# Patient Record
Sex: MALE | Race: WHITE | Employment: OTHER | URBAN - METROPOLITAN AREA
[De-identification: names, ages, dates, MRNs, and addresses within clinical notes are randomized per-mention and may not be internally consistent; named-entity substitution may affect disease eponyms.]

---

## 2017-09-25 ENCOUNTER — HOSPITAL ENCOUNTER (OUTPATIENT)
Dept: VASCULAR LAB | Age: 77
Discharge: HOME | End: 2017-09-25
Payer: MEDICARE

## 2017-09-25 PROCEDURE — 93880 EXTRACRANIAL BILAT STUDY: CPT

## 2017-10-17 ENCOUNTER — OFFICE VISIT (OUTPATIENT)
Dept: UROLOGY | Age: 77
End: 2017-10-17
Payer: MEDICARE

## 2017-10-17 VITALS
DIASTOLIC BLOOD PRESSURE: 50 MMHG | HEART RATE: 57 BPM | HEIGHT: 74 IN | BODY MASS INDEX: 33.16 KG/M2 | WEIGHT: 258.4 LBS | SYSTOLIC BLOOD PRESSURE: 105 MMHG | TEMPERATURE: 97.4 F

## 2017-10-17 DIAGNOSIS — N40.0 BPH WITHOUT OBSTRUCTION/LOWER URINARY TRACT SYMPTOMS: ICD-10-CM

## 2017-10-17 DIAGNOSIS — Q61.02 MULTIPLE RENAL CYSTS: ICD-10-CM

## 2017-10-17 DIAGNOSIS — R31.9 HEMATURIA, UNSPECIFIED TYPE: Primary | ICD-10-CM

## 2017-10-17 LAB
BILIRUBIN, POC: ABNORMAL
BLOOD URINE, POC: ABNORMAL
CLARITY, POC: CLEAR
COLOR, POC: ABNORMAL
GLUCOSE URINE, POC: ABNORMAL
KETONES, POC: ABNORMAL
LEUKOCYTE EST, POC: ABNORMAL
NITRITE, POC: ABNORMAL
PH, POC: ABNORMAL
PROTEIN, POC: ABNORMAL
SPECIFIC GRAVITY, POC: ABNORMAL
UROBILINOGEN, POC: ABNORMAL

## 2017-10-17 PROCEDURE — 81003 URINALYSIS AUTO W/O SCOPE: CPT | Performed by: UROLOGY

## 2017-10-17 PROCEDURE — 99214 OFFICE O/P EST MOD 30 MIN: CPT | Performed by: UROLOGY

## 2017-10-17 RX ORDER — MOMETASONE FUROATE AND FORMOTEROL FUMARATE DIHYDRATE 200; 5 UG/1; UG/1
1 AEROSOL RESPIRATORY (INHALATION) DAILY
Refills: 5 | COMMUNITY
Start: 2017-10-01 | End: 2018-08-24 | Stop reason: DRUGHIGH

## 2017-10-17 RX ORDER — INFLUENZA A VIRUS A/MICHIGAN/45/2015 X-275 (H1N1) ANTIGEN (FORMALDEHYDE INACTIVATED), INFLUENZA A VIRUS A/SINGAPORE/INFIMH-16-0019/2016 IVR-186 (H3N2) ANTIGEN (FORMALDEHYDE INACTIVATED), AND INFLUENZA B VIRUS B/MARYLAND/15/2016 BX-69A (A B/COLORADO/6/2017-LIKE VIRUS) ANTIGEN (FORMALDEHYDE INACTIVATED) 60; 60; 60 UG/.5ML; UG/.5ML; UG/.5ML
INJECTION, SUSPENSION INTRAMUSCULAR
Refills: 0 | COMMUNITY
Start: 2017-09-28 | End: 2017-10-17 | Stop reason: ALTCHOICE

## 2017-10-17 NOTE — PROGRESS NOTES
every two hours?: Not at all  INTERMITTENCY: How often have you found you stopped and started again several times when you urinated?: Not at all  URGENCY: How often have you found it difficult to postpone urination?: Not at all  WEAK STREAM: How often have you had a weak urinary stream?: Not at all  STRAINING: How often have you had to strain to start  urination?: Not at all  NOCTURIA: How many times did you typically get up at night to uriniate?: 2 Times  TOTAL I-PSS SCORE[de-identified] 2  How would you feel if you were to spend the rest of your life with your urinary condition?: Pleased    Last BUN and creatinine:  Lab Results   Component Value Date    BUN 14 05/07/2015     Lab Results   Component Value Date    CREATININE 0.65 (L) 05/07/2015       Additional Lab/Culture results: none    Imaging Reviewed during this Office Visit: none  CT urogram 2015, 2 stable renal cysts  (results were independently reviewed by physician and radiology report verified)    PAST MEDICAL, FAMILY AND SOCIAL HISTORY:  Past Medical History:   Diagnosis Date    Arrhythmia     Arthritis     o.a.    Atrial flutter ([address]     BPH (benign prostatic hyperplasia)     Hard of hearing     Hyperlipidemia     Hypertension     Skin abnormalities     SCABS ON ARMS    Wears eyeglasses      Past Surgical History:   Procedure Laterality Date    CATARACT REMOVAL WITH IMPLANT Bilateral     COLONOSCOPY      polypectomy    EYE SURGERY      CATARACTS    JOINT REPLACEMENT Bilateral     Knees    KNEE JOINT MANIPULATION Left 11/25/14    OTHER SURGICAL HISTORY Left 2-10-15    Knee arthroplasty revision- poly exchange.     TOTAL KNEE ARTHROPLASTY Right 7-8-14    TOTAL KNEE ARTHROPLASTY Left Sept 18, 2014     Family History   Problem Relation Age of Onset   [facility] Cancer Brother      colon cancer    Diabetes Mother     Other Father      black lung disease     Outpatient Prescriptions Marked as Taking for the 10/17/17 encounter (Office Visit) with Daniel Payne MD   Medication Sig Dispense Refill    DULERA 200-5 MCG/ACT inhaler Inhale 1 Inhaler into the lungs daily  5    apixaban (ELIQUIS) 2.5 MG TABS tablet Take 2.5 mg by mouth 2 times daily      atorvastatin (LIPITOR) 40 MG tablet Take 40 mg by mouth nightly.  metoprolol-hydrochlorothiazide (LOPRESSOR HCT) 50-25 MG per tablet Take 1 tablet by mouth daily.  tamsulosin (FLOMAX) 0.4 MG capsule Take 0.4 mg by mouth daily. Oxycodone hcl and Other  History   Smoking Status    Former Smoker    Packs/day: 0.50    Years: 60.00    Quit date: 4/17/2017   Smokeless Tobacco    Never Used      (If patient a smoker, smoking cessation counseling offered)   History   Alcohol Use No     Comment: QUIT 10 TO 20 YRS AGO       REVIEW OF SYSTEMS:  Review of Systems    Physical Exam:    This a 76 y.o. male   Vitals:    10/17/17 0914   BP: (!) 105/50   Pulse: 57   Temp: 97.4 °F (36.3 °C)     Body mass index is 33.18 kg/m². Physical Exam  Constitutional: Patient in no acute distress. Neuro: Alert and oriented to person, place and time. Psych: Mood normal, affect normal  Skin: No rash noted  HEENT: Head: Normocephalic and atraumatic  Lungs: Respiratory effort is normal  Cardiovascular: Warm & Pink  Abdomen: Soft, non-tender, non-distended with no CVA,  No flank tenderness,  Or hepatosplenomegaly   Lymphatics: No palpable lymphadenopathy. Bladder non-tender and not distended. Musculoskeletal: Normal gait and station  Penis normal and uncircumcised  Urethral meatus normal  Scrotal exam normal  Testicles normal bilaterally      Assessment and Plan      1. Hematuria, unspecified type    2. BPH without obstruction/lower urinary tract symptoms    3. Multiple renal cysts           Plan:         We discussed his options. Given microhematuria and smoking history, I recommended a CTU and a cysto. He does want to proceed with either. No voiding symptoms despite a large prostate. F/U in 6 months.      Prescriptions

## 2017-10-17 NOTE — LETTER
[facility]  [facility] [place]  [address]  [address]  [address] [place] [id number]  Dept: [phone]  Dept Fax: [phone]        10/17/17    Patient: Rubi Odom  YOB: 1940    Dear Yefri Reed DO,    I had the pleasure of seeing one of your patients, Jitendra Patel today in the office today. Below are the relevant portions of my assessment and plan of care. IMPRESSION:     1. Hematuria, unspecified type    2. BPH without obstruction/lower urinary tract symptoms    3. Multiple renal cysts        PLAN:        We discussed his options. Given microhematuria and smoking history, I recommended a CTU and a cysto. He does want to proceed with either. No voiding symptoms despite a large prostate. F/U in 6 months. Prescriptions Ordered:  No orders of the defined types were placed in this encounter. Orders Placed:  Orders Placed This Encounter   Procedures    POCT Urinalysis No Micro (Auto)         Thank you for allowing me to participate in the care of this patient. I will keep you updated on this patient's follow up and I look forward to serving you and your patients again in the future.         Bolivar Felton MD

## 2018-03-19 ENCOUNTER — HOSPITAL ENCOUNTER (OUTPATIENT)
Age: 78
Setting detail: SPECIMEN
Discharge: HOME | End: 2018-03-19
Payer: MEDICARE

## 2018-03-19 ENCOUNTER — OFFICE VISIT (OUTPATIENT)
Dept: UROLOGY | Age: 78
End: 2018-03-19
Payer: MEDICARE

## 2018-03-19 VITALS
HEIGHT: 74 IN | BODY MASS INDEX: 33.16 KG/M2 | HEART RATE: 61 BPM | DIASTOLIC BLOOD PRESSURE: 70 MMHG | WEIGHT: 258.38 LBS | SYSTOLIC BLOOD PRESSURE: 139 MMHG | RESPIRATION RATE: 16 BRPM

## 2018-03-19 DIAGNOSIS — D69.6 THROMBOCYTOPENIA (HCC): ICD-10-CM

## 2018-03-19 DIAGNOSIS — R31.0 HEMATURIA, GROSS: Primary | ICD-10-CM

## 2018-03-19 DIAGNOSIS — R31.0 HEMATURIA, GROSS: ICD-10-CM

## 2018-03-19 DIAGNOSIS — Z79.01 ON CONTINUOUS ORAL ANTICOAGULATION: ICD-10-CM

## 2018-03-19 DIAGNOSIS — Z87.891 FORMER SMOKER: ICD-10-CM

## 2018-03-19 DIAGNOSIS — I48.91 ATRIAL FIBRILLATION, UNSPECIFIED TYPE (HCC): ICD-10-CM

## 2018-03-19 LAB
BILIRUBIN, POC: ABNORMAL
BLOOD URINE, POC: ABNORMAL
CLARITY, POC: ABNORMAL
COLOR, POC: ABNORMAL
GLUCOSE URINE, POC: ABNORMAL
KETONES, POC: ABNORMAL
LEUKOCYTE EST, POC: ABNORMAL
NITRITE, POC: ABNORMAL
PH, POC: ABNORMAL
PROTEIN, POC: ABNORMAL
SPECIFIC GRAVITY, POC: ABNORMAL
UROBILINOGEN, POC: ABNORMAL

## 2018-03-19 PROCEDURE — 99213 OFFICE O/P EST LOW 20 MIN: CPT | Performed by: NURSE PRACTITIONER

## 2018-03-19 PROCEDURE — 81003 URINALYSIS AUTO W/O SCOPE: CPT | Performed by: NURSE PRACTITIONER

## 2018-03-19 RX ORDER — AMOXICILLIN AND CLAVULANATE POTASSIUM 875; 125 MG/1; MG/1
TABLET, FILM COATED ORAL
Refills: 0 | COMMUNITY
Start: 2018-02-23 | End: 2018-04-19 | Stop reason: ALTCHOICE

## 2018-03-19 NOTE — PROGRESS NOTES
iliopsoas muscle which appears to    contain a central fluid density with enhancing margins. This lesion    extends along the iliopsoas muscle into the gluteal region and into the    right abductor region to the level of the lesser trochanter but is still    not completely imaged caudally. The right ureter is intimately associated    with this lesion but not obstructed or apparently encased by same. Lesion    most likely represents an iliopsoas abscess. Tumor or other lesion is    probably unlikely.       There are punctate calcifications within spleen likely due to healed    granulomatous disease.) No calcifications are noted on the previous 2011    CT scan of the chest. The spleen, liver, adrenal glands, pancreas and    appendix are within normal limits. There are calculi within the    gallbladder. . No definite bowel abnormalities are noted. . There is    relatively severe degenerative change in the apophyseal joints of the    lumbar spine possibly with a borderline canal stenosis at L4-L5. There is    a small apparently metallic foreign object in the right gluteal region.                   IMPRESSION:      1. Probable right iliopsoas abscess. Possibly the cause of hematuria. Contrast enhanced MRI of the pelvis, lower lumbar region and proximal    thigh is recommended .    2 stable benign 8 mm cyst right kidney; otherwise unremarkable upper    urinary tracts. 3. Prominent prostatic enlargement. 4. The lower lumbar spinal stenosis. 5. Cholelithiasis. 6. Healed granulomatous changes affecting the spleen       Report electronically signed by Nora Barakat M.D. on 5/8/2015 9:49 AM    5/8/2015 9:49 AM   Transcribed by: [facility] on May  8 2015  9:52A    Read by: Rhett Urban M.D.  [id number] on May  8 2015  9:52A    Electronically Signed by: Denis Garcia.  Rhett Urban M.D. on:  IYM  2 9775     9:52A        (results were independently reviewed by physician and radiology report verified)    [address] [facility]

## 2018-03-19 NOTE — PATIENT INSTRUCTIONS
cysto was recommended by Dr Jefferson in 2015- but pt wanted to hold off.      Recommend cysto with Dr Jefferson with smoking Hx.      Stay hydrated.      Talk to Dr Ladan Reinoso about holding anticoagulation by Dr Terry Orellana.

## 2018-03-19 NOTE — LETTER
[facility]  [facility] [place]  [address]  [address]  [address] [facility] [address]  Dept: [phone]  Dept Fax: [phone]        3/19/18    Patient: Mila Rodríguez  YOB: 1940    Dear Talat Spear, ,    I had the pleasure of seeing one of your patients, Jamila Gilliland today in the office today. Below are the relevant portions of my assessment and plan of care. IMPRESSION:     1. Hematuria, gross    2. Former smoker    3. Thrombocytopenia ([address]    4. Atrial fibrillation, unspecified type ([address]    5. On continuous oral anticoagulation        PLAN:   cysto was recommended by Dr Lazaro Winston in 2015- but pt wanted to hold off. Recommend cysto with Dr Lazaro Winston with smoking Hx. Stay hydrated. Talk to Dr Karlie Valdez about holding anticoagulation by Dr Chantel Smith. Orders Placed:  Orders Placed This Encounter   Procedures    POCT Urinalysis No Micro (Auto)       Thank you for allowing me to participate in the care of this patient. I will keep you updated on this patient's follow up and I look forward to serving you and your patients again in the future.     Shabnam Roberts, CNP

## 2018-03-20 LAB
CULTURE: NO GROWTH
CULTURE: NORMAL
Lab: NORMAL
SPECIMEN DESCRIPTION: NORMAL
STATUS: NORMAL

## 2018-03-21 DIAGNOSIS — R31.0 GROSS HEMATURIA: Primary | ICD-10-CM

## 2018-03-26 ENCOUNTER — HOSPITAL ENCOUNTER (OUTPATIENT)
Dept: ULTRASOUND IMAGING | Age: 78
Discharge: HOME | End: 2018-03-28
Payer: MEDICARE

## 2018-03-26 DIAGNOSIS — R31.0 GROSS HEMATURIA: ICD-10-CM

## 2018-03-26 PROCEDURE — 76770 US EXAM ABDO BACK WALL COMP: CPT

## 2018-04-19 ENCOUNTER — HOSPITAL ENCOUNTER (OUTPATIENT)
Age: 78
Setting detail: SPECIMEN
Discharge: HOME | End: 2018-04-19
Payer: MEDICARE

## 2018-04-19 ENCOUNTER — PROCEDURE VISIT (OUTPATIENT)
Dept: UROLOGY | Age: 78
End: 2018-04-19
Payer: MEDICARE

## 2018-04-19 VITALS — HEART RATE: 64 BPM | TEMPERATURE: 97.5 F | DIASTOLIC BLOOD PRESSURE: 74 MMHG | SYSTOLIC BLOOD PRESSURE: 135 MMHG

## 2018-04-19 DIAGNOSIS — R31.0 GROSS HEMATURIA: Primary | ICD-10-CM

## 2018-04-19 DIAGNOSIS — N40.0 BPH WITHOUT OBSTRUCTION/LOWER URINARY TRACT SYMPTOMS: ICD-10-CM

## 2018-04-19 PROCEDURE — 52000 CYSTOURETHROSCOPY: CPT | Performed by: UROLOGY

## 2018-04-19 PROCEDURE — 99999 PR OFFICE/OUTPT VISIT,PROCEDURE ONLY: CPT | Performed by: UROLOGY

## 2018-04-19 RX ORDER — FINASTERIDE 5 MG/1
5 TABLET, FILM COATED ORAL DAILY
Qty: 30 TABLET | Refills: 11 | Status: SHIPPED | OUTPATIENT
Start: 2018-04-19 | End: 2019-04-08 | Stop reason: SDUPTHER

## 2018-04-20 LAB
CASE NUMBER:: NORMAL
SPECIMEN DESCRIPTION: NORMAL
SURGICAL PATHOLOGY REPORT: NORMAL

## 2018-05-01 ENCOUNTER — TELEPHONE (OUTPATIENT)
Dept: UROLOGY | Age: 78
End: 2018-05-01

## 2018-05-02 ENCOUNTER — HOSPITAL ENCOUNTER (OUTPATIENT)
Age: 78
Setting detail: SPECIMEN
Discharge: HOME | End: 2018-05-02
Payer: MEDICARE

## 2018-05-02 ENCOUNTER — PROCEDURE VISIT (OUTPATIENT)
Dept: UROLOGY | Age: 78
End: 2018-05-02

## 2018-05-02 DIAGNOSIS — R31.0 GROSS HEMATURIA: Primary | ICD-10-CM

## 2018-05-02 PROCEDURE — 99999 PR OFFICE/OUTPT VISIT,PROCEDURE ONLY: CPT | Performed by: NURSE PRACTITIONER

## 2018-05-14 LAB — UROTHELIAL CANCER DETECTION: NORMAL

## 2018-06-28 ENCOUNTER — HOSPITAL ENCOUNTER (OUTPATIENT)
Dept: NUCLEAR MEDICINE | Age: 78
Discharge: HOME | End: 2018-06-30
Payer: MEDICARE

## 2018-06-28 ENCOUNTER — HOSPITAL ENCOUNTER (OUTPATIENT)
Dept: NON INVASIVE DIAGNOSTICS | Age: 78
Discharge: HOME | End: 2018-06-28
Payer: MEDICARE

## 2018-06-28 VITALS — SYSTOLIC BLOOD PRESSURE: 177 MMHG | DIASTOLIC BLOOD PRESSURE: 88 MMHG | HEART RATE: 63 BPM

## 2018-06-28 VITALS — BODY MASS INDEX: 33.37 KG/M2 | HEIGHT: 74 IN | WEIGHT: 260 LBS

## 2018-06-28 DIAGNOSIS — J44.9 CHRONIC OBSTRUCTIVE PULMONARY DISEASE, UNSPECIFIED COPD TYPE (HCC): ICD-10-CM

## 2018-06-28 DIAGNOSIS — I10 ESSENTIAL HYPERTENSION: ICD-10-CM

## 2018-06-28 DIAGNOSIS — R06.09 DYSPNEA ON EXERTION: ICD-10-CM

## 2018-06-28 DIAGNOSIS — I48.91 ATRIAL FIBRILLATION, UNSPECIFIED TYPE (HCC): ICD-10-CM

## 2018-06-28 DIAGNOSIS — R53.83 OTHER FATIGUE: ICD-10-CM

## 2018-06-28 DIAGNOSIS — N40.0 BENIGN PROSTATIC HYPERPLASIA WITHOUT LOWER URINARY TRACT SYMPTOMS: ICD-10-CM

## 2018-06-28 DIAGNOSIS — E78.00 HIGH CHOLESTEROL: ICD-10-CM

## 2018-06-28 DIAGNOSIS — G47.33 OBSTRUCTIVE SLEEP APNEA SYNDROME: ICD-10-CM

## 2018-06-28 DIAGNOSIS — R31.0 GROSS HEMATURIA: ICD-10-CM

## 2018-06-28 LAB
LV EF: 55 %
LV EF: 60 %
LVEF MODALITY: NORMAL
LVEF MODALITY: NORMAL

## 2018-06-28 PROCEDURE — A9500 TC99M SESTAMIBI: HCPCS | Performed by: FAMILY MEDICINE

## 2018-06-28 PROCEDURE — 3430000000 HC RX DIAGNOSTIC RADIOPHARMACEUTICAL: Performed by: FAMILY MEDICINE

## 2018-06-28 PROCEDURE — 93306 TTE W/DOPPLER COMPLETE: CPT

## 2018-06-28 PROCEDURE — 93017 CV STRESS TEST TRACING ONLY: CPT

## 2018-06-28 PROCEDURE — 78452 HT MUSCLE IMAGE SPECT MULT: CPT

## 2018-06-28 PROCEDURE — 2580000003 HC RX 258: Performed by: FAMILY MEDICINE

## 2018-06-28 PROCEDURE — 6360000002 HC RX W HCPCS: Performed by: FAMILY MEDICINE

## 2018-06-28 RX ORDER — AMINOPHYLLINE DIHYDRATE 25 MG/ML
100 INJECTION, SOLUTION INTRAVENOUS
Status: ACTIVE | OUTPATIENT
Start: 2018-06-28 | End: 2018-06-28

## 2018-06-28 RX ORDER — SODIUM CHLORIDE 0.9 % (FLUSH) 0.9 %
10 SYRINGE (ML) INJECTION PRN
Status: DISCONTINUED | OUTPATIENT
Start: 2018-06-28 | End: 2018-06-29 | Stop reason: HOSPADM

## 2018-06-28 RX ORDER — METOPROLOL TARTRATE 5 MG/5ML
2.5 INJECTION INTRAVENOUS PRN
Status: DISCONTINUED | OUTPATIENT
Start: 2018-06-28 | End: 2018-06-29 | Stop reason: HOSPADM

## 2018-06-28 RX ORDER — SODIUM CHLORIDE 0.9 % (FLUSH) 0.9 %
10 SYRINGE (ML) INJECTION PRN
Status: DISCONTINUED | OUTPATIENT
Start: 2018-06-28 | End: 2018-07-01 | Stop reason: HOSPADM

## 2018-06-28 RX ORDER — NITROGLYCERIN 0.4 MG/1
0.4 TABLET SUBLINGUAL EVERY 5 MIN PRN
Status: DISCONTINUED | OUTPATIENT
Start: 2018-06-28 | End: 2018-06-29 | Stop reason: HOSPADM

## 2018-06-28 RX ADMIN — TETRAKIS(2-METHOXYISOBUTYLISOCYANIDE)COPPER(I) TETRAFLUOROBORATE 11 MILLICURIE: 1 INJECTION, POWDER, LYOPHILIZED, FOR SOLUTION INTRAVENOUS at 07:13

## 2018-06-28 RX ADMIN — Medication 10 ML: at 08:52

## 2018-06-28 RX ADMIN — Medication 10 ML: at 07:13

## 2018-06-28 RX ADMIN — TETRAKIS(2-METHOXYISOBUTYLISOCYANIDE)COPPER(I) TETRAFLUOROBORATE 35.1 MILLICURIE: 1 INJECTION, POWDER, LYOPHILIZED, FOR SOLUTION INTRAVENOUS at 09:12

## 2018-06-28 RX ADMIN — Medication 10 ML: at 09:11

## 2018-06-28 RX ADMIN — REGADENOSON 0.4 MG: 0.08 INJECTION, SOLUTION INTRAVENOUS at 09:10

## 2018-06-28 NOTE — PROCEDURES
[address] [facility]                      [address] [facility]                                CARDIAC STRESS TEST    PATIENT NAME: Tanya Singer                     :        1940  MED REC NO:   [id number]                              ROOM:  ACCOUNT NO:   [de-identified]                           ADMIT DATE: 2018  PROVIDER:     Roger Lopez    DATE OF STUDY:  2018    ORDERING PROVIDER:  Barby Toledo MD    INTERPRETING PHYSICIAN:  CHARLEEN DEL ROSARIO MD    LEXISCAN STRESS TEST    INDICATION:  ATRIAL FIBRILLATION. INTERPRETATION:  Resting heart rate:  63 bpm.  Resting blood pressure:  177/88 mmHg. Resting EKG:  Atrial fibrillation. Intraventricular conduction delay. Premature ventricular contractions. Stress heart response:  Normal response. Blood pressure response:  Appropriate. Stress EKGs:  Normal.  No chest pain during stress or recovery. No ischemic EKG changes. IMPRESSION:  NEGATIVE LEXISCAN STRESS TEST. THE NUCLEAR SCAN TO FOLLOW.         CHYNA DEL ROSARIO    D: 2018 9:25:32       T: 2018 9:27:49     ANUPAMA/SHELIA  Job#: [id number]     Doc#: Unknown    CC:    (Retain this field even if not dictated or not decipherable)

## 2018-08-03 ENCOUNTER — OFFICE VISIT (OUTPATIENT)
Dept: UROLOGY | Age: 78
End: 2018-08-03
Payer: MEDICARE

## 2018-08-03 VITALS — HEART RATE: 62 BPM | DIASTOLIC BLOOD PRESSURE: 71 MMHG | TEMPERATURE: 97.6 F | SYSTOLIC BLOOD PRESSURE: 129 MMHG

## 2018-08-03 DIAGNOSIS — R31.0 GROSS HEMATURIA: ICD-10-CM

## 2018-08-03 DIAGNOSIS — N13.8 BPH WITH OBSTRUCTION/LOWER URINARY TRACT SYMPTOMS: Primary | ICD-10-CM

## 2018-08-03 DIAGNOSIS — N40.1 BPH WITH OBSTRUCTION/LOWER URINARY TRACT SYMPTOMS: Primary | ICD-10-CM

## 2018-08-03 DIAGNOSIS — R33.9 INCOMPLETE BLADDER EMPTYING: ICD-10-CM

## 2018-08-03 LAB
BILIRUBIN, POC: ABNORMAL
BLOOD URINE, POC: ABNORMAL
CLARITY, POC: ABNORMAL
COLOR, POC: YELLOW
GLUCOSE URINE, POC: ABNORMAL
KETONES, POC: ABNORMAL
LEUKOCYTE EST, POC: ABNORMAL
NITRITE, POC: ABNORMAL
PH, POC: ABNORMAL
PROTEIN, POC: ABNORMAL
SPECIFIC GRAVITY, POC: ABNORMAL
UROBILINOGEN, POC: ABNORMAL

## 2018-08-03 PROCEDURE — 99214 OFFICE O/P EST MOD 30 MIN: CPT | Performed by: UROLOGY

## 2018-08-03 PROCEDURE — 51798 US URINE CAPACITY MEASURE: CPT | Performed by: UROLOGY

## 2018-08-03 PROCEDURE — 81002 URINALYSIS NONAUTO W/O SCOPE: CPT | Performed by: UROLOGY

## 2018-08-03 ASSESSMENT — ENCOUNTER SYMPTOMS
EYE REDNESS: 0
NAUSEA: 0
SHORTNESS OF BREATH: 1
COLOR CHANGE: 0
WHEEZING: 0
EYE PAIN: 0
BACK PAIN: 0
COUGH: 1
VOMITING: 0
ABDOMINAL PAIN: 0

## 2018-08-03 NOTE — PROGRESS NOTES
by mouth daily Takes 2 tablets to equal 0.8 mg         Oxycodone hcl and Other  History   Smoking Status    Former Smoker    Packs/day: 0.50    Years: 60.00    Quit date: 4/17/2017   Smokeless Tobacco    Never Used     (If patient a smoker, smoking cessation counseling offered)    History   Alcohol Use No     Comment: quit in 2000       REVIEW OF SYSTEMS:  Review of Systems    Physical Exam:      Vitals:    08/03/18 1112   BP: 129/71   Pulse: 62   Temp: 97.6 °F (36.4 °C)     There is no height or weight on file to calculate BMI. Patient is a [de-identified] y.o. male in no acute distress and alert and oriented to person, place and time. Physical Exam  Constitutional: Patient in no acute distress. Neuro: Alert and oriented to person, place and time. Psych: Mood normal, affect normal  Lungs: Respiratory effort is normal  Cardiovascular: Warm & Pink  Abdomen: Soft, non-tender, non-distended with no CVA,  No flank tenderness,  Or hepatosplenomegaly   Lymphatics: No palpable lymphadenopathy. Bladder non-tender and not distended. Musculoskeletal: Normal gait and station  Testiscles: Normal, bilaterally      Assessment and Plan      1. BPH with obstruction/lower urinary tract symptoms    2. Gross hematuria    3. Incomplete bladder emptying           Plan: Will obtain Ct urogram.  Needs cysto. Prescriptions Ordered:  No orders of the defined types were placed in this encounter.      Orders Placed:  Orders Placed This Encounter   Procedures    CT UROGRAM     Standing Status:   Future     Number of Occurrences:   1     Standing Expiration Date:   8/3/2019    BUN & Creatinine     Standing Status:   Future     Standing Expiration Date:   7/29/2019    POCT Urinalysis no Micro    NM MEASUREMENT,POST-VOID RESIDUAL VOLUME BY US,NON-IMAGING          Juni Treviño MD

## 2018-08-09 ENCOUNTER — HOSPITAL ENCOUNTER (OUTPATIENT)
Dept: CT IMAGING | Age: 78
Discharge: HOME | End: 2018-08-11
Payer: MEDICARE

## 2018-08-09 DIAGNOSIS — R31.0 GROSS HEMATURIA: ICD-10-CM

## 2018-08-09 LAB
BUN BLDV-MCNC: 21 MG/DL (ref 8–23)
CREAT SERPL-MCNC: 0.93 MG/DL (ref 0.7–1.2)
GFR AFRICAN AMERICAN: >60 ML/MIN
GFR NON-AFRICAN AMERICAN: >60 ML/MIN
GFR SERPL CREATININE-BSD FRML MDRD: NORMAL ML/MIN/{1.73_M2}
GFR SERPL CREATININE-BSD FRML MDRD: NORMAL ML/MIN/{1.73_M2}

## 2018-08-09 PROCEDURE — 6360000004 HC RX CONTRAST MEDICATION: Performed by: UROLOGY

## 2018-08-09 PROCEDURE — 74178 CT ABD&PLV WO CNTR FLWD CNTR: CPT

## 2018-08-09 PROCEDURE — 84520 ASSAY OF UREA NITROGEN: CPT

## 2018-08-09 PROCEDURE — 36415 COLL VENOUS BLD VENIPUNCTURE: CPT

## 2018-08-09 PROCEDURE — 82565 ASSAY OF CREATININE: CPT

## 2018-08-09 PROCEDURE — 2580000003 HC RX 258: Performed by: UROLOGY

## 2018-08-09 RX ORDER — 0.9 % SODIUM CHLORIDE 0.9 %
80 INTRAVENOUS SOLUTION INTRAVENOUS ONCE
Status: COMPLETED | OUTPATIENT
Start: 2018-08-09 | End: 2018-08-09

## 2018-08-09 RX ORDER — SODIUM CHLORIDE 0.9 % (FLUSH) 0.9 %
10 SYRINGE (ML) INJECTION PRN
Status: DISCONTINUED | OUTPATIENT
Start: 2018-08-09 | End: 2018-08-12 | Stop reason: HOSPADM

## 2018-08-09 RX ADMIN — IOPAMIDOL 120 ML: 755 INJECTION, SOLUTION INTRAVENOUS at 10:37

## 2018-08-09 RX ADMIN — SODIUM CHLORIDE 80 ML: 9 INJECTION, SOLUTION INTRAVENOUS at 10:36

## 2018-08-09 RX ADMIN — Medication 10 ML: at 10:37

## 2018-08-15 ENCOUNTER — TELEPHONE (OUTPATIENT)
Dept: UROLOGY | Age: 78
End: 2018-08-15

## 2018-08-24 ENCOUNTER — HOSPITAL ENCOUNTER (OUTPATIENT)
Dept: PREADMISSION TESTING | Age: 78
Discharge: HOME | End: 2018-08-28
Payer: MEDICARE

## 2018-08-24 VITALS
WEIGHT: 257 LBS | OXYGEN SATURATION: 95 % | TEMPERATURE: 98.4 F | DIASTOLIC BLOOD PRESSURE: 57 MMHG | HEART RATE: 60 BPM | BODY MASS INDEX: 32.98 KG/M2 | RESPIRATION RATE: 16 BRPM | SYSTOLIC BLOOD PRESSURE: 99 MMHG | HEIGHT: 74 IN

## 2018-08-24 LAB
ABSOLUTE BANDS #: 0.47 K/UL (ref 0–1)
ABSOLUTE EOS #: 0 K/UL (ref 0–0.4)
ABSOLUTE IMMATURE GRANULOCYTE: ABNORMAL K/UL (ref 0–0.3)
ABSOLUTE LYMPH #: 1.21 K/UL (ref 1–4.8)
ABSOLUTE MONO #: 2.05 K/UL (ref 0.1–1.3)
ANION GAP SERPL CALCULATED.3IONS-SCNC: 12 MMOL/L (ref 9–17)
ATYPICAL LYMPHOCYTE ABSOLUTE COUNT: 0.09 K/UL
ATYPICAL LYMPHOCYTES: 1 %
BANDS: 5 % (ref 0–10)
BASOPHILS # BLD: 0 % (ref 0–2)
BASOPHILS ABSOLUTE: 0 K/UL (ref 0–0.2)
BUN BLDV-MCNC: 22 MG/DL (ref 8–23)
BUN/CREAT BLD: ABNORMAL (ref 9–20)
CALCIUM SERPL-MCNC: 9.7 MG/DL (ref 8.6–10.4)
CHLORIDE BLD-SCNC: 96 MMOL/L (ref 98–107)
CO2: 33 MMOL/L (ref 20–31)
CREAT SERPL-MCNC: 0.92 MG/DL (ref 0.7–1.2)
DIFFERENTIAL TYPE: ABNORMAL
EKG ATRIAL RATE: 69 BPM
EKG Q-T INTERVAL: 438 MS
EKG QRS DURATION: 98 MS
EKG QTC CALCULATION (BAZETT): 438 MS
EKG R AXIS: -36 DEGREES
EKG T AXIS: 7 DEGREES
EKG VENTRICULAR RATE: 60 BPM
EOSINOPHILS RELATIVE PERCENT: 0 % (ref 0–4)
GFR AFRICAN AMERICAN: >60 ML/MIN
GFR NON-AFRICAN AMERICAN: >60 ML/MIN
GFR SERPL CREATININE-BSD FRML MDRD: ABNORMAL ML/MIN/{1.73_M2}
GFR SERPL CREATININE-BSD FRML MDRD: ABNORMAL ML/MIN/{1.73_M2}
GLUCOSE BLD-MCNC: 108 MG/DL (ref 70–99)
HCT VFR BLD CALC: 43.1 % (ref 41–53)
HEMOGLOBIN: 14.5 G/DL (ref 13.5–17.5)
IMMATURE GRANULOCYTES: ABNORMAL %
LYMPHOCYTES # BLD: 13 % (ref 24–44)
MCH RBC QN AUTO: 32.6 PG (ref 26–34)
MCHC RBC AUTO-ENTMCNC: 33.7 G/DL (ref 31–37)
MCV RBC AUTO: 96.8 FL (ref 80–100)
MONOCYTES # BLD: 22 % (ref 1–7)
MORPHOLOGY: NORMAL
NRBC AUTOMATED: ABNORMAL PER 100 WBC
PDW BLD-RTO: 13.6 % (ref 11.5–14.9)
PLATELET # BLD: 150 K/UL (ref 150–450)
PLATELET ESTIMATE: ABNORMAL
PMV BLD AUTO: 9.8 FL (ref 6–12)
POTASSIUM SERPL-SCNC: 4 MMOL/L (ref 3.7–5.3)
RBC # BLD: 4.45 M/UL (ref 4.5–5.9)
RBC # BLD: ABNORMAL 10*6/UL
SEG NEUTROPHILS: 59 % (ref 36–66)
SEGMENTED NEUTROPHILS ABSOLUTE COUNT: 5.48 K/UL (ref 1.3–9.1)
SODIUM BLD-SCNC: 141 MMOL/L (ref 135–144)
WBC # BLD: 9.3 K/UL (ref 3.5–11)
WBC # BLD: ABNORMAL 10*3/UL

## 2018-08-24 PROCEDURE — 93005 ELECTROCARDIOGRAM TRACING: CPT

## 2018-08-24 PROCEDURE — 36415 COLL VENOUS BLD VENIPUNCTURE: CPT

## 2018-08-24 PROCEDURE — 87186 SC STD MICRODIL/AGAR DIL: CPT

## 2018-08-24 PROCEDURE — 80048 BASIC METABOLIC PNL TOTAL CA: CPT

## 2018-08-24 PROCEDURE — 87086 URINE CULTURE/COLONY COUNT: CPT

## 2018-08-24 PROCEDURE — 86403 PARTICLE AGGLUT ANTBDY SCRN: CPT

## 2018-08-24 PROCEDURE — 85025 COMPLETE CBC W/AUTO DIFF WBC: CPT

## 2018-08-24 RX ORDER — FUROSEMIDE 20 MG/1
20 TABLET ORAL 2 TIMES DAILY
Status: ON HOLD | COMMUNITY
End: 2018-11-19

## 2018-08-24 RX ORDER — ALBUTEROL SULFATE 90 UG/1
2 AEROSOL, METERED RESPIRATORY (INHALATION) 4 TIMES DAILY PRN
COMMUNITY

## 2018-08-24 RX ORDER — POTASSIUM CHLORIDE 750 MG/1
20 CAPSULE, EXTENDED RELEASE ORAL DAILY
COMMUNITY
End: 2018-10-17 | Stop reason: DRUGHIGH

## 2018-08-24 ASSESSMENT — PAIN SCALES - GENERAL: PAINLEVEL_OUTOF10: 2

## 2018-08-24 ASSESSMENT — PAIN DESCRIPTION - LOCATION: LOCATION: BACK

## 2018-08-24 ASSESSMENT — PAIN DESCRIPTION - ORIENTATION: ORIENTATION: MID

## 2018-08-24 NOTE — H&P
HISTORY and [address]       NAME:  Frankie Bower  MRN: [id number]   YOB: 1940   Date: 8/24/2018   Age: [de-identified] y.o. Gender: male       COMPLAINT AND PRESENT HISTORY:   Frankie Bower is [de-identified] y.o.,   male, undergoing preadmission testing for BPH with obstruction. Patient will be having a   CYSTO URETEROSCOPY RIGHT (HOLMIUM LASER ON STANDBY) W/STENT INSERTION &  BLADDER BIOPSY FULGERATION-Right. Pt C/O of poor flow of urine, dribbling, frequency and a sense of incomplete evacuation of the Bladder. Pt has hx of gross hematuria. No Nausea Vomiting or diaphoresis. no fever or chills. Symptoms started 6 months ago with hx of recurrent UT. PAST MEDICAL HISTORY     Past Medical History:   Diagnosis Date    Ambulates with cane     unstable, total knee surgery didn't help stability    Arrhythmia     Arthritis     o.a.    At risk for falling     unstable walking, uses cane    Atrial flutter (HCC)     BPH (benign prostatic hyperplasia)     Cholelithiases     seen on CT    COPD (chronic obstructive pulmonary disease) ([address]     Hard of hearing     no hearing aids    Hematuria     History of pneumonia 02/2018    Hyperlipidemia     Hypertension     Kidney stone     seen on CT, 2 mm right ureter    Skin abnormalities     gets random SCABS ON ARMS    Wears eyeglasses        Pt denies any history of Diabetes mellitus type 2,  stroke,  COPD, Asthma, GERD,  Cancer, Seizures,Thyroid disease, Kidney Disease, Hepatitis, TB, Psychiatric Disorders or Substance abuse.     SURGICAL HISTORY       Past Surgical History:   Procedure Laterality Date    CATARACT REMOVAL WITH IMPLANT Bilateral     COLONOSCOPY      polypectomy    EYE SURGERY Bilateral     CATARACTS, see other surgical history    JOINT REPLACEMENT Bilateral     Knees, see other surgical history    KNEE JOINT MANIPULATION Left 11/25/14    OTHER SURGICAL HISTORY Left 2-10-15    Knee arthroplasty revision- poly exchange.  TOTAL KNEE ARTHROPLASTY Right 7-8-14    TOTAL KNEE ARTHROPLASTY Left Sept 18, 2014       FAMILY HISTORY       Family History   Problem Relation Age of Onset    Cancer Brother         colon cancer    Diabetes Mother     Other Father         black lung disease       SOCIAL HISTORY       Social History     Social History    Marital status:      Spouse name: N/A    Number of children: N/A    Years of education: N/A     Occupational History    retired [employer]     Social History Main Topics    Smoking status: Former Smoker     Packs/day: 0.50     Years: 60.00     Quit date: 4/17/2017    Smokeless tobacco: Never Used    Alcohol use No      Comment: quit in 2000    Drug use: No    Sexual activity: Not Asked     Other Topics Concern    None     Social History Narrative    None        REVIEW OF SYSTEMS      Allergies   Allergen Reactions    Oxycodone Hcl      confusion    Other      Vee Koehler is \"allergic to all pain pills except for the one he is on now---NORCO. \"       Current Outpatient Prescriptions on File Prior to Encounter   Medication Sig Dispense Refill    aspirin 81 MG tablet Take 81 mg by mouth daily      finasteride (PROSCAR) 5 MG tablet Take 1 tablet by mouth daily 30 tablet 11    atorvastatin (LIPITOR) 40 MG tablet Take 40 mg by mouth nightly.  metoprolol-hydrochlorothiazide (LOPRESSOR HCT) 50-25 MG per tablet Take 1 tablet by mouth daily.  tamsulosin (FLOMAX) 0.4 MG capsule Take 0.8 mg by mouth daily Takes 2 tablets to equal 0.8 mg       No current facility-administered medications on file prior to encounter. General health:  Fairly good. No fever or chills. Skin:  No itching, redness or rash. HEENT:  No headache, epistaxis or sore throat. Neck:  No pain, stiffness or masses.                  Cardiovascular/Respiratory system:  No chest pain, palpitation or shortness of

## 2018-08-25 ENCOUNTER — ANESTHESIA EVENT (OUTPATIENT)
Dept: OPERATING ROOM | Age: 78
End: 2018-08-25
Payer: MEDICARE

## 2018-08-25 LAB
CULTURE: ABNORMAL
Lab: ABNORMAL
ORGANISM: ABNORMAL
SPECIMEN DESCRIPTION: ABNORMAL
STATUS: ABNORMAL

## 2018-08-25 RX ORDER — SODIUM CHLORIDE, SODIUM LACTATE, POTASSIUM CHLORIDE, CALCIUM CHLORIDE 600; 310; 30; 20 MG/100ML; MG/100ML; MG/100ML; MG/100ML
INJECTION, SOLUTION INTRAVENOUS CONTINUOUS
Status: CANCELLED | OUTPATIENT
Start: 2018-08-25

## 2018-08-25 RX ORDER — SODIUM CHLORIDE 0.9 % (FLUSH) 0.9 %
10 SYRINGE (ML) INJECTION EVERY 12 HOURS SCHEDULED
Status: CANCELLED | OUTPATIENT
Start: 2018-08-25

## 2018-08-25 RX ORDER — LIDOCAINE HYDROCHLORIDE 10 MG/ML
1 INJECTION, SOLUTION EPIDURAL; INFILTRATION; INTRACAUDAL; PERINEURAL
Status: CANCELLED | OUTPATIENT
Start: 2018-08-25 | End: 2018-08-25

## 2018-08-25 RX ORDER — SODIUM CHLORIDE 0.9 % (FLUSH) 0.9 %
10 SYRINGE (ML) INJECTION PRN
Status: CANCELLED | OUTPATIENT
Start: 2018-08-25

## 2018-08-28 ENCOUNTER — OFFICE VISIT (OUTPATIENT)
Dept: UROLOGY | Age: 78
End: 2018-08-28
Payer: MEDICARE

## 2018-08-28 VITALS — HEART RATE: 62 BPM | DIASTOLIC BLOOD PRESSURE: 74 MMHG | SYSTOLIC BLOOD PRESSURE: 140 MMHG | TEMPERATURE: 97.6 F

## 2018-08-28 DIAGNOSIS — R31.0 GROSS HEMATURIA: ICD-10-CM

## 2018-08-28 DIAGNOSIS — R93.41 ABNORMAL RADIOLOGIC FINDINGS ON DIAGNOSTIC IMAGING OF RENAL PELVIS, URETER, OR BLADDER: ICD-10-CM

## 2018-08-28 DIAGNOSIS — N30.01 ACUTE CYSTITIS WITH HEMATURIA: Primary | ICD-10-CM

## 2018-08-28 PROCEDURE — 99214 OFFICE O/P EST MOD 30 MIN: CPT | Performed by: UROLOGY

## 2018-08-28 RX ORDER — DOXYCYCLINE 100 MG/1
100 CAPSULE ORAL 2 TIMES DAILY
Qty: 14 CAPSULE | Refills: 0 | Status: ON HOLD | OUTPATIENT
Start: 2018-08-28 | End: 2018-09-14 | Stop reason: ALTCHOICE

## 2018-08-28 ASSESSMENT — ENCOUNTER SYMPTOMS
ABDOMINAL PAIN: 0
BACK PAIN: 1
COLOR CHANGE: 0
EYE PAIN: 0
NAUSEA: 0
COUGH: 0
SHORTNESS OF BREATH: 1
WHEEZING: 0
EYE REDNESS: 0
VOMITING: 0

## 2018-08-28 NOTE — PROGRESS NOTES
Take 1 tablet by mouth daily.  tamsulosin (FLOMAX) 0.4 MG capsule Take 0.8 mg by mouth daily Takes 2 tablets to equal 0.8 mg         Oxycodone hcl and Other  History   Smoking Status    Former Smoker    Packs/day: 0.50    Years: 60.00    Quit date: 4/17/2017   Smokeless Tobacco    Never Used     (If patient a smoker, smoking cessation counseling offered)    History   Alcohol Use No     Comment: quit in 2000       REVIEW OF SYSTEMS:  Review of Systems     Agree with ROS    Physical Exam:      Vitals:    08/28/18 1133   BP: (!) 140/74   Pulse:    Temp:      There is no height or weight on file to calculate BMI. Patient is a [de-identified] y.o. male in no acute distress and alert and oriented to person, place and time. Physical Exam  Constitutional: Patient in no acute distress. Neuro: Alert and oriented to person, place and time. Psych: Mood normal, affect normal  Lungs: Respiratory effort is normal  Cardiovascular: Warm & Pink  Abdomen: Soft, non-tender, non-distended with no CVA,  No flank tenderness,  Or hepatosplenomegaly   Lymphatics: No palpable lymphadenopathy. Bladder non-tender and not distended. Musculoskeletal: Normal gait and station      Assessment and Plan      1. Acute cystitis with hematuria    2. Gross hematuria    3. Abnormal radiologic findings on diagnostic imaging of renal pelvis, ureter, or bladder           Plan: Will start Doxycycline  He is set up for cysto with right URS and possible biopsy of collecting system lesion. Return for surgery. Prescriptions Ordered:  Orders Placed This Encounter   Medications    doxycycline monohydrate (MONODOX) 100 MG capsule     Sig: Take 1 capsule by mouth 2 times daily     Dispense:  14 capsule     Refill:  0      Orders Placed:  No orders of the defined types were placed in this encounter. Fernando Francis MD    Agree with the ROS entered by the MA.

## 2018-08-29 ENCOUNTER — TELEPHONE (OUTPATIENT)
Dept: UROLOGY | Age: 78
End: 2018-08-29

## 2018-08-29 NOTE — TELEPHONE ENCOUNTER
Cysto, (RT) URS, (RT) stent, bladder BX & Fulg @ [facility] 9/14/18 3:30pm **STOP BLOOD THINNERS 9/7/18**  PAT was done 8/24/18                Spoke with wife, new procedure info sent 8/29/18.

## 2018-09-04 ENCOUNTER — TELEPHONE (OUTPATIENT)
Dept: UROLOGY | Age: 78
End: 2018-09-04

## 2018-09-06 ENCOUNTER — TELEPHONE (OUTPATIENT)
Dept: UROLOGY | Age: 78
End: 2018-09-06

## 2018-09-06 NOTE — TELEPHONE ENCOUNTER
Pt's wife states that she called 2 days ago and was told that gross hematuria would be discussed with Dr Kaushik Riley. Pt still has not heard back from the office. Pt was treated for gross hematuria on 8/28/18 with doxycycline and symptoms have not subsided. Pt is scheduled for cysto, Rt URS/stent, and bladder bx/fulg on 9/14/18. Please advise.

## 2018-09-14 ENCOUNTER — HOSPITAL ENCOUNTER (OUTPATIENT)
Age: 78
Setting detail: OUTPATIENT SURGERY
Discharge: HOME | End: 2018-09-14
Attending: UROLOGY | Admitting: UROLOGY
Payer: MEDICARE

## 2018-09-14 ENCOUNTER — APPOINTMENT (OUTPATIENT)
Dept: GENERAL RADIOLOGY | Age: 78
End: 2018-09-14
Attending: UROLOGY
Payer: MEDICARE

## 2018-09-14 ENCOUNTER — ANESTHESIA (OUTPATIENT)
Dept: OPERATING ROOM | Age: 78
End: 2018-09-14
Payer: MEDICARE

## 2018-09-14 VITALS
TEMPERATURE: 97.3 F | WEIGHT: 257 LBS | BODY MASS INDEX: 32.98 KG/M2 | OXYGEN SATURATION: 96 % | DIASTOLIC BLOOD PRESSURE: 69 MMHG | HEART RATE: 76 BPM | SYSTOLIC BLOOD PRESSURE: 134 MMHG | HEIGHT: 74 IN | RESPIRATION RATE: 20 BRPM

## 2018-09-14 VITALS
OXYGEN SATURATION: 100 % | TEMPERATURE: 96.8 F | DIASTOLIC BLOOD PRESSURE: 60 MMHG | SYSTOLIC BLOOD PRESSURE: 104 MMHG | RESPIRATION RATE: 7 BRPM

## 2018-09-14 DIAGNOSIS — R31.0 GROSS HEMATURIA: Primary | ICD-10-CM

## 2018-09-14 PROCEDURE — 88342 IMHCHEM/IMCYTCHM 1ST ANTB: CPT

## 2018-09-14 PROCEDURE — 2580000003 HC RX 258: Performed by: NURSE ANESTHETIST, CERTIFIED REGISTERED

## 2018-09-14 PROCEDURE — 6360000004 HC RX CONTRAST MEDICATION: Performed by: UROLOGY

## 2018-09-14 PROCEDURE — 2580000003 HC RX 258: Performed by: ANESTHESIOLOGY

## 2018-09-14 PROCEDURE — 7100000001 HC PACU RECOVERY - ADDTL 15 MIN: Performed by: UROLOGY

## 2018-09-14 PROCEDURE — 3209999900 FLUORO FOR SURGICAL PROCEDURES

## 2018-09-14 PROCEDURE — 7100000000 HC PACU RECOVERY - FIRST 15 MIN: Performed by: UROLOGY

## 2018-09-14 PROCEDURE — 3600000002 HC SURGERY LEVEL 2 BASE: Performed by: UROLOGY

## 2018-09-14 PROCEDURE — C1769 GUIDE WIRE: HCPCS | Performed by: UROLOGY

## 2018-09-14 PROCEDURE — C2617 STENT, NON-COR, TEM W/O DEL: HCPCS | Performed by: UROLOGY

## 2018-09-14 PROCEDURE — 3700000001 HC ADD 15 MINUTES (ANESTHESIA): Performed by: UROLOGY

## 2018-09-14 PROCEDURE — C1758 CATHETER, URETERAL: HCPCS | Performed by: UROLOGY

## 2018-09-14 PROCEDURE — 2709999900 HC NON-CHARGEABLE SUPPLY: Performed by: UROLOGY

## 2018-09-14 PROCEDURE — 2720000010 HC SURG SUPPLY STERILE: Performed by: UROLOGY

## 2018-09-14 PROCEDURE — 7100000031 HC ASPR PHASE II RECOVERY - ADDTL 15 MIN: Performed by: UROLOGY

## 2018-09-14 PROCEDURE — 7100000030 HC ASPR PHASE II RECOVERY - FIRST 15 MIN: Performed by: UROLOGY

## 2018-09-14 PROCEDURE — 3700000000 HC ANESTHESIA ATTENDED CARE: Performed by: UROLOGY

## 2018-09-14 PROCEDURE — 6360000002 HC RX W HCPCS: Performed by: UROLOGY

## 2018-09-14 PROCEDURE — 2500000003 HC RX 250 WO HCPCS: Performed by: NURSE ANESTHETIST, CERTIFIED REGISTERED

## 2018-09-14 PROCEDURE — 3600000012 HC SURGERY LEVEL 2 ADDTL 15MIN: Performed by: UROLOGY

## 2018-09-14 PROCEDURE — 88305 TISSUE EXAM BY PATHOLOGIST: CPT

## 2018-09-14 PROCEDURE — 6360000002 HC RX W HCPCS: Performed by: NURSE ANESTHETIST, CERTIFIED REGISTERED

## 2018-09-14 PROCEDURE — 74018 RADEX ABDOMEN 1 VIEW: CPT

## 2018-09-14 PROCEDURE — 74420 UROGRAPHY RTRGR +-KUB: CPT

## 2018-09-14 DEVICE — URETERAL STENT
Type: IMPLANTABLE DEVICE | Site: URETER | Status: FUNCTIONAL
Brand: POLARIS™ ULTRA

## 2018-09-14 RX ORDER — MEPERIDINE HYDROCHLORIDE 50 MG/ML
12.5 INJECTION INTRAMUSCULAR; INTRAVENOUS; SUBCUTANEOUS EVERY 5 MIN PRN
Status: DISCONTINUED | OUTPATIENT
Start: 2018-09-14 | End: 2018-09-14 | Stop reason: HOSPADM

## 2018-09-14 RX ORDER — CEFAZOLIN SODIUM 1 G/3ML
INJECTION, POWDER, FOR SOLUTION INTRAMUSCULAR; INTRAVENOUS
Status: DISCONTINUED
Start: 2018-09-14 | End: 2018-09-14 | Stop reason: HOSPADM

## 2018-09-14 RX ORDER — LIDOCAINE HYDROCHLORIDE 10 MG/ML
1 INJECTION, SOLUTION EPIDURAL; INFILTRATION; INTRACAUDAL; PERINEURAL
Status: DISCONTINUED | OUTPATIENT
Start: 2018-09-14 | End: 2018-09-14 | Stop reason: HOSPADM

## 2018-09-14 RX ORDER — SODIUM CHLORIDE, SODIUM LACTATE, POTASSIUM CHLORIDE, CALCIUM CHLORIDE 600; 310; 30; 20 MG/100ML; MG/100ML; MG/100ML; MG/100ML
INJECTION, SOLUTION INTRAVENOUS CONTINUOUS
Status: DISCONTINUED | OUTPATIENT
Start: 2018-09-14 | End: 2018-09-14 | Stop reason: HOSPADM

## 2018-09-14 RX ORDER — LIDOCAINE HYDROCHLORIDE 10 MG/ML
INJECTION, SOLUTION EPIDURAL; INFILTRATION; INTRACAUDAL; PERINEURAL PRN
Status: DISCONTINUED | OUTPATIENT
Start: 2018-09-14 | End: 2018-09-14 | Stop reason: SDUPTHER

## 2018-09-14 RX ORDER — SODIUM CHLORIDE, SODIUM LACTATE, POTASSIUM CHLORIDE, CALCIUM CHLORIDE 600; 310; 30; 20 MG/100ML; MG/100ML; MG/100ML; MG/100ML
INJECTION, SOLUTION INTRAVENOUS CONTINUOUS PRN
Status: DISCONTINUED | OUTPATIENT
Start: 2018-09-14 | End: 2018-09-14 | Stop reason: SDUPTHER

## 2018-09-14 RX ORDER — SODIUM CHLORIDE 0.9 % (FLUSH) 0.9 %
10 SYRINGE (ML) INJECTION EVERY 12 HOURS SCHEDULED
Status: DISCONTINUED | OUTPATIENT
Start: 2018-09-14 | End: 2018-09-14 | Stop reason: HOSPADM

## 2018-09-14 RX ORDER — SODIUM CHLORIDE 0.9 % (FLUSH) 0.9 %
10 SYRINGE (ML) INJECTION PRN
Status: DISCONTINUED | OUTPATIENT
Start: 2018-09-14 | End: 2018-09-14 | Stop reason: HOSPADM

## 2018-09-14 RX ORDER — DIPHENHYDRAMINE HYDROCHLORIDE 50 MG/ML
12.5 INJECTION INTRAMUSCULAR; INTRAVENOUS
Status: DISCONTINUED | OUTPATIENT
Start: 2018-09-14 | End: 2018-09-14 | Stop reason: HOSPADM

## 2018-09-14 RX ORDER — ONDANSETRON 2 MG/ML
INJECTION INTRAMUSCULAR; INTRAVENOUS PRN
Status: DISCONTINUED | OUTPATIENT
Start: 2018-09-14 | End: 2018-09-14 | Stop reason: SDUPTHER

## 2018-09-14 RX ORDER — ONDANSETRON 2 MG/ML
4 INJECTION INTRAMUSCULAR; INTRAVENOUS
Status: DISCONTINUED | OUTPATIENT
Start: 2018-09-14 | End: 2018-09-14 | Stop reason: HOSPADM

## 2018-09-14 RX ORDER — LABETALOL HYDROCHLORIDE 5 MG/ML
5 INJECTION, SOLUTION INTRAVENOUS EVERY 10 MIN PRN
Status: DISCONTINUED | OUTPATIENT
Start: 2018-09-14 | End: 2018-09-14 | Stop reason: HOSPADM

## 2018-09-14 RX ORDER — FENTANYL CITRATE 50 UG/ML
50 INJECTION, SOLUTION INTRAMUSCULAR; INTRAVENOUS EVERY 5 MIN PRN
Status: DISCONTINUED | OUTPATIENT
Start: 2018-09-14 | End: 2018-09-14 | Stop reason: HOSPADM

## 2018-09-14 RX ORDER — PROPOFOL 10 MG/ML
INJECTION, EMULSION INTRAVENOUS PRN
Status: DISCONTINUED | OUTPATIENT
Start: 2018-09-14 | End: 2018-09-14 | Stop reason: SDUPTHER

## 2018-09-14 RX ORDER — HYDROCODONE BITARTRATE AND ACETAMINOPHEN 5; 325 MG/1; MG/1
1 TABLET ORAL EVERY 6 HOURS PRN
Qty: 10 TABLET | Refills: 0 | Status: SHIPPED | OUTPATIENT
Start: 2018-09-14 | End: 2018-09-16

## 2018-09-14 RX ORDER — DEXAMETHASONE SODIUM PHOSPHATE 4 MG/ML
INJECTION, SOLUTION INTRA-ARTICULAR; INTRALESIONAL; INTRAMUSCULAR; INTRAVENOUS; SOFT TISSUE PRN
Status: DISCONTINUED | OUTPATIENT
Start: 2018-09-14 | End: 2018-09-14 | Stop reason: SDUPTHER

## 2018-09-14 RX ORDER — FENTANYL CITRATE 50 UG/ML
INJECTION, SOLUTION INTRAMUSCULAR; INTRAVENOUS PRN
Status: DISCONTINUED | OUTPATIENT
Start: 2018-09-14 | End: 2018-09-14 | Stop reason: SDUPTHER

## 2018-09-14 RX ADMIN — LIDOCAINE HYDROCHLORIDE 50 MG: 10 INJECTION, SOLUTION EPIDURAL; INFILTRATION; INTRACAUDAL; PERINEURAL at 14:27

## 2018-09-14 RX ADMIN — ONDANSETRON 4 MG: 2 INJECTION INTRAMUSCULAR; INTRAVENOUS at 14:40

## 2018-09-14 RX ADMIN — SODIUM CHLORIDE, POTASSIUM CHLORIDE, SODIUM LACTATE AND CALCIUM CHLORIDE: 600; 310; 30; 20 INJECTION, SOLUTION INTRAVENOUS at 13:29

## 2018-09-14 RX ADMIN — SODIUM CHLORIDE, POTASSIUM CHLORIDE, SODIUM LACTATE AND CALCIUM CHLORIDE: 600; 310; 30; 20 INJECTION, SOLUTION INTRAVENOUS at 14:24

## 2018-09-14 RX ADMIN — Medication 2 G: at 14:38

## 2018-09-14 RX ADMIN — LIDOCAINE HYDROCHLORIDE 100 MG: 10 INJECTION, SOLUTION EPIDURAL; INFILTRATION; INTRACAUDAL; PERINEURAL at 14:50

## 2018-09-14 RX ADMIN — PROPOFOL 250 MG: 10 INJECTION, EMULSION INTRAVENOUS at 14:27

## 2018-09-14 RX ADMIN — DEXAMETHASONE SODIUM PHOSPHATE 4 MG: 4 INJECTION, SOLUTION INTRAMUSCULAR; INTRAVENOUS at 14:40

## 2018-09-14 RX ADMIN — FENTANYL CITRATE 100 MCG: 50 INJECTION, SOLUTION INTRAMUSCULAR; INTRAVENOUS at 14:54

## 2018-09-14 ASSESSMENT — PULMONARY FUNCTION TESTS
PIF_VALUE: 2
PIF_VALUE: 2
PIF_VALUE: 15
PIF_VALUE: 9
PIF_VALUE: 20
PIF_VALUE: 12
PIF_VALUE: 11
PIF_VALUE: 15
PIF_VALUE: 16
PIF_VALUE: 15
PIF_VALUE: 10
PIF_VALUE: 15
PIF_VALUE: 15
PIF_VALUE: 7
PIF_VALUE: 10
PIF_VALUE: 10
PIF_VALUE: 15
PIF_VALUE: 10
PIF_VALUE: 15
PIF_VALUE: 15
PIF_VALUE: 10
PIF_VALUE: 2
PIF_VALUE: 11
PIF_VALUE: 4
PIF_VALUE: 2
PIF_VALUE: 12
PIF_VALUE: 2
PIF_VALUE: 15
PIF_VALUE: 4
PIF_VALUE: 2
PIF_VALUE: 10
PIF_VALUE: 11
PIF_VALUE: 2
PIF_VALUE: 12
PIF_VALUE: 15
PIF_VALUE: 1
PIF_VALUE: 15
PIF_VALUE: 10
PIF_VALUE: 23
PIF_VALUE: 10
PIF_VALUE: 10
PIF_VALUE: 11
PIF_VALUE: 11
PIF_VALUE: 10
PIF_VALUE: 15
PIF_VALUE: 12
PIF_VALUE: 12
PIF_VALUE: 3
PIF_VALUE: 15
PIF_VALUE: 10
PIF_VALUE: 11
PIF_VALUE: 12
PIF_VALUE: 15

## 2018-09-14 ASSESSMENT — PAIN SCALES - GENERAL
PAINLEVEL_OUTOF10: 0

## 2018-09-14 ASSESSMENT — ENCOUNTER SYMPTOMS: STRIDOR: 0

## 2018-09-14 ASSESSMENT — COPD QUESTIONNAIRES: CAT_SEVERITY: NO INTERVAL CHANGE

## 2018-09-14 ASSESSMENT — PAIN - FUNCTIONAL ASSESSMENT: PAIN_FUNCTIONAL_ASSESSMENT: 0-10

## 2018-09-14 NOTE — ANESTHESIA PRE PROCEDURE
MD        sodium chloride flush 0.9 % injection 10 mL  10 mL Intravenous PRN Vinita Jimenez MD           Allergies: Allergies   Allergen Reactions    Oxycodone Hcl      confusion    Other      HALLUCINATES ,      Relates is \"allergic to all pain pills except for the one he is on now---NORCO. \"       Problem List:    Patient Active Problem List   Diagnosis Code    Osteoarthritis of both knees M17.0    A-fib ([address] I48.91    HTN (hypertension) I10    COPD (chronic obstructive pulmonary disease) ([address] J44.9    Hyperlipemia E78.5    DJD (degenerative joint disease) M19.90    Arthrofibrosis of total knee arthroplasty ([address] T84.82XA    Status post total knee replacement Z96.659    Arthrofibrosis of total knee arthroplasty ([address] T84.82XA    Hematuria R31.9       Past Medical History:        Diagnosis Date    Ambulates with cane     unstable, total knee surgery didn't help stability    Arrhythmia     Arthritis     o.a.    At risk for falling     unstable walking, uses cane    Atrial fibrillation ([address] 11/25/2014    on EKG    Atrial flutter ([address] 05/13/2014    on EKG    BPH (benign prostatic hyperplasia)     Cholelithiases     seen on CT    COPD (chronic obstructive pulmonary disease) ([address]     Hard of hearing     no hearing aids    Hematuria     History of pneumonia 02/2018    Hyperlipidemia     Hypertension     Kidney stone     seen on CT, 2 mm right ureter    Skin abnormalities     gets random SCABS ON ARMS    Wears eyeglasses        Past Surgical History:        Procedure Laterality Date    CATARACT REMOVAL WITH IMPLANT Bilateral     COLONOSCOPY      polypectomy    EYE SURGERY Bilateral     CATARACTS, see other surgical history    JOINT REPLACEMENT Bilateral     Knees, see other surgical history    KNEE JOINT MANIPULATION Left 11/25/14    OTHER SURGICAL HISTORY Left 2-10-15    Knee arthroplasty revision- poly exchange.     TOTAL KNEE ARTHROPLASTY Right 7-8-14    TOTAL KNEE ARTHROPLASTY Left Sept 18, 2014       Social History:    Social History   Substance Use Topics    Smoking status: Former Smoker     Packs/day: 0.50     Years: 60.00     Quit date: 4/17/2017    Smokeless tobacco: Never Used    Alcohol use No      Comment: quit in 2000                                Counseling given: Not Answered      Vital Signs (Current): There were no vitals filed for this visit. BP Readings from Last 3 Encounters:   08/24/18 (!) 99/57   08/28/18 (!) 140/74   08/03/18 129/71       NPO Status:                                                                                 BMI:   Wt Readings from Last 3 Encounters:   08/24/18 257 lb (116.6 kg)   06/28/18 260 lb (117.9 kg)   03/19/18 258 lb 6.1 oz (117.2 kg)     There is no height or weight on file to calculate BMI.    CBC:   Lab Results   Component Value Date    WBC 9.3 08/24/2018    RBC 4.45 08/24/2018    RBC 4.92 10/13/2011    HGB 14.5 08/24/2018    HCT 43.1 08/24/2018    MCV 96.8 08/24/2018    RDW 13.6 08/24/2018     08/24/2018     10/13/2011       CMP:   Lab Results   Component Value Date     08/24/2018    K 4.0 08/24/2018    CL 96 08/24/2018    CO2 33 08/24/2018    BUN 22 08/24/2018    CREATININE 0.92 08/24/2018    GFRAA >60 08/24/2018    LABGLOM >60 08/24/2018    GLUCOSE 108 08/24/2018    GLUCOSE 111 10/13/2011    PROT 7.8 01/24/2015    CALCIUM 9.7 08/24/2018    BILITOT 0.62 01/24/2015    ALKPHOS 95 01/24/2015    AST 20 01/24/2015    ALT 19 01/24/2015       POC Tests: No results for input(s): POCGLU, POCNA, POCK, POCCL, POCBUN, POCHEMO, POCHCT in the last 72 hours.     Coags: No results found for: PROTIME, INR, APTT    HCG (If Applicable): No results found for: PREGTESTUR, PREGSERUM, HCG, HCGQUANT     ABGs: No results found for: PHART, PO2ART, KRU0NEH, GHC5YGT, BEART, Y7UIWRQM     Type & Screen (If Applicable):  No results found for: LUKAS [facility]    Anesthesia Evaluation  Patient summary reviewed  Airway: Mallampati: II  TM distance: >3 FB   Neck ROM: full  Mouth opening: > = 3 FB Dental:    (+) upper dentures      Pulmonary:   (+) COPD: no interval change,  wheezes,      (-) rhonchi, rales and stridor                           Cardiovascular:  Exercise tolerance: good (>4 METS),   (+) hypertension: no interval change,     (-) past MI, CAD, murmur,  friction rub, carotid bruit and peripheral edema    ECG reviewed  Rhythm: regular  Rate: normal           Beta Blocker:  Dose within 24 Hrs         Neuro/Psych:   Negative Neuro/Psych ROS              GI/Hepatic/Renal:   (+) morbid obesity          Endo/Other: Negative Endo/Other ROS                    Abdominal:           Vascular: negative vascular ROS. Anesthesia Plan      general     ASA 3       Induction: intravenous. MIPS: Postoperative opioids intended and Prophylactic antiemetics administered. Anesthetic plan and risks discussed with patient. Plan discussed with CRNA.                   Boston Rodas MD   9/14/2018

## 2018-09-14 NOTE — H&P (VIEW-ONLY)
breath. Gastrointestinal tract: No abdominal pain, nausea, vomiting, diarrhea or constipation. Genitourinary:       See HPI. Locomotor:  No bone or joint pains. No swelling. Neuropsychiatric:  No referable complaints. GENERAL PHYSICAL EXAM:     Vitals: BP (!) 99/57   Pulse 60   Temp 98.4 °F (36.9 °C) (Oral)   Resp 16   Ht 6' 2\" (1.88 m)   Wt 257 lb (116.6 kg)   SpO2 95%   BMI 33.00 kg/m²  Body mass index is 33 kg/m². GENERAL APPEARANCE:   Alexandra Lamar is [de-identified] y.o.,  male, mildly obese, nourished, conscious, alert. Does not appear to be distress or pain at this time. SKIN:  Warm, dry, no cyanosis or jaundice. HEAD:  Normocephalic, atraumatic, no swelling or tenderness. EYES:  Pupils equal, reactive to light. EARS:  No discharge. Noted hardness of hearing in michael ears. NOSE:  No rhinorrhea, epistaxis or septal deformity. THROAT:  Not congested. No ulceration bleeding or discharge. NECK:  No stiffness, trachea central.  No palpable masses or L.N.                 CHEST:  Symmetrical and equal on expansion. HEART:  RRR S1 > S2. No audible murmurs or gallops. LUNGS:  Equal on expansion, normal breath sounds. No adventitious sounds. ABDOMEN:  Mildly obese. Distended. Soft on palpation. No localized tenderness. No guarding or rigidity. No palpable hepatosplenomegaly. LYMPHATICS:  No palpable cervical lymphadenopathy. LOCOMOTOR, BACK AND SPINE:  No tenderness or deformities. EXTREMITIES:  Symmetrical. 2+ pitting edema in michael LE. Sanas sign negative. No discoloration or ulcerations. NEUROLOGIC:  The patient is conscious, alert, oriented, No apparent focal sensory or motor deficits.

## 2018-09-14 NOTE — ANESTHESIA POSTPROCEDURE EVALUATION
POST- ANESTHESIA EVALUATION       Pt Name: Bhaskar Lara  MRN: [id number]  YOB: 1940  Date of evaluation: 9/14/2018  Time:  4:12 PM      BP (!) 148/67   Pulse 76   Temp 97.5 °F (36.4 °C) (Infrared)   Resp 22   Ht 6' 2\" (1.88 m)   Wt 257 lb (116.6 kg)   SpO2 96%   BMI 33.00 kg/m²      Consciousness Level  Awake  Cardiopulmonary Status  Stable  Pain Adequately Treated YES  Nausea / Vomiting  NO  Adequate Hydration  YES  Anesthesia Related Complications NONE      Electronically signed by Annmarie Butcher MD on 9/14/2018 at [address] [place] [address]       Department of Anesthesiology  Postprocedure Note    Patient: Bhaskar Lara  MRN: [id number]  YOB: 1940  Date of evaluation: 9/14/2018  Time:  4:12 PM     Procedure Summary     Date:  09/14/18 Room / Location:  [facility] 02 / [address]    Anesthesia Start:  0166 Anesthesia Stop:  8375    Procedure:  CYSTO URETEROSCOPY RIGHT  W/STENT INSERTION &  RIGHT RENAL PELVIC  BIOPSY AND RIGHT RETROGRADE PYLEOGRAM (Right ) Diagnosis:  (HEAMTURIA RIGHT KIDNEY STONE BLADDER MASS )    Surgeon:  Leobardo Nevarez MD Responsible Provider:  Annmarie Butcher MD    Anesthesia Type:  general ASA Status:  3          Anesthesia Type: general    Xin Phase I: Xin Score: 8    Xin Phase II:      Last vitals: Reviewed and per EMR flowsheets.        Anesthesia Post Evaluation

## 2018-09-15 NOTE — OP NOTE
no tumors were seen. The right ureteral orifice  was identified. A wire was advanced of the right ureteral orifice. The  dual-lumen catheter was advanced over the wire. I then shot a retrograde  pyelogram through the dual-lumen catheter. The calyces did not show an  obvious filling defect nor did the renal pelvis that was _____ obvious. No  hydronephrosis was seen. The contour of the mid and lower pole calyces  seem to be a bit deflected and altered a bit medially as if being  compressed by the mass, but there did not appear to be an obvious filling  defect in any of the collecting systems. Second wire was advanced through  the dual-lumen catheter and the dual-lumen catheter was removed. Flexible  ureteroscope was then advanced over the working wire into the ureter and  collecting system. The entire collecting system was carefully identified  in the area that appeared to be normal on the retrograde pyelogram.  A  large nodular appearing mass was seen to be in the collecting system of the  kidney. Two good biopsies of this area were taken using the Piranha  forceps. After the biopsies were complete, the tissue was sent for  pathologic analysis. The ureteroscope was removed. The cystoscope was  back loaded over the wire. A 6 x 26 stent was advanced over the wire into  the ureter and collecting system. A good proximal curl was seen in the  renal pelvis and a good distal curl was seen in the bladder. The bladder  was drained. The procedure was completed. He awoke from anesthesia  without any complications. He was taken back to postoperative anesthesia  care in good condition. He will be discharged home today. He is to remove  the stent in 48 hours and will followup to go over the results of the  biopsy.         Eliana Ryan    D: 09/15/2018 0:04:38       T: 09/15/2018 11:32:38     [email or web address]  Job#: [id number]     Doc#: [id number]    CC:  Venkata Kumar

## 2018-09-20 LAB — SURGICAL PATHOLOGY REPORT: NORMAL

## 2018-09-25 ENCOUNTER — OFFICE VISIT (OUTPATIENT)
Dept: UROLOGY | Age: 78
End: 2018-09-25
Payer: MEDICARE

## 2018-09-25 VITALS — DIASTOLIC BLOOD PRESSURE: 63 MMHG | TEMPERATURE: 97.3 F | SYSTOLIC BLOOD PRESSURE: 125 MMHG | HEART RATE: 68 BPM

## 2018-09-25 DIAGNOSIS — R31.0 GROSS HEMATURIA: ICD-10-CM

## 2018-09-25 DIAGNOSIS — C65.1 RENAL PELVIS TRANSITIONAL CELL MALIGNANT NEOPLASM, RIGHT (HCC): Primary | ICD-10-CM

## 2018-09-25 PROCEDURE — 99214 OFFICE O/P EST MOD 30 MIN: CPT | Performed by: UROLOGY

## 2018-09-25 ASSESSMENT — ENCOUNTER SYMPTOMS
COLOR CHANGE: 0
BACK PAIN: 1
EYE REDNESS: 0
EYE PAIN: 0
COUGH: 1
ABDOMINAL PAIN: 0
NAUSEA: 0
SHORTNESS OF BREATH: 1
WHEEZING: 0
VOMITING: 0

## 2018-09-25 NOTE — PROGRESS NOTES
Review of Systems   Constitutional: Negative for activity change, chills and fever. Eyes: Negative for pain, redness and visual disturbance. Respiratory: Positive for cough and shortness of breath. Negative for wheezing. Cardiovascular: Negative for chest pain and leg swelling. Gastrointestinal: Negative for abdominal pain, nausea and vomiting. Genitourinary: Positive for hematuria. Negative for difficulty urinating, discharge, dysuria, flank pain, frequency, scrotal swelling and testicular pain. Musculoskeletal: Positive for back pain, gait problem and joint swelling. Negative for myalgias. Skin: Negative for color change and rash. Neurological: Negative for dizziness, tremors and numbness. Hematological: Negative for adenopathy. Bruises/bleeds easily.

## 2018-09-25 NOTE — PROGRESS NOTES
[facility]  [facility] [place]  [address]  [address]  [address] [facility] [address]  Dept: [address] [facility] Urology Office Note - Established    Patient:  Venita Bragg  YOB: 1940  Date: 9/25/2018    The patient is a [de-identified] y.o. male who presents today for evaluation of the following problems:   Chief Complaint   Patient presents with    Results     biopsy       HPI  Here in follow up after URS and biopsy of right renal mass. He is doing well. No hematuria. No flank pain. Stent is out. Path was consistent with high grade UC. CT was otherwise negative. We discussed options today. Summary of old records: N/A    Additional History: N/A    Procedures Today: N/A    Urinalysis today:  No results found for this visit on 09/25/18. Last several PSA's:  Lab Results   Component Value Date    PSA 3.17 10/18/2013    PSA 2.26 12/26/2012    PSA 2.42 10/13/2011     Last total testosterone:  No results found for: TESTOSTERONE    AUA Symptom Score (9/25/2018): Last BUN and creatinine:  Lab Results   Component Value Date    BUN 22 08/24/2018     Lab Results   Component Value Date    CREATININE 0.92 08/24/2018       Additional Lab/Culture results: path reviewed.      Imaging Reviewed during this Office Visit: none  (results were independently reviewed by physician and radiology report verified)    PAST MEDICAL, FAMILY AND SOCIAL HISTORY UPDATE:  Past Medical History:   Diagnosis Date    Ambulates with cane     unstable, total knee surgery didn't help stability    Arrhythmia     Arthritis     o.a.    At risk for falling     unstable walking, uses cane    Atrial fibrillation ([address] 11/25/2014    on EKG    Atrial flutter ([address] 05/13/2014    on EKG    BPH (benign prostatic hyperplasia)     Cholelithiases     seen on CT    COPD (chronic obstructive pulmonary disease) ([address]     Hard of hearing     no hearing aids    Hematuria     History of

## 2018-10-01 ENCOUNTER — TELEPHONE (OUTPATIENT)
Dept: UROLOGY | Age: 78
End: 2018-10-01

## 2018-10-13 ENCOUNTER — HOSPITAL ENCOUNTER (OUTPATIENT)
Dept: VASCULAR LAB | Age: 78
Discharge: HOME | End: 2018-10-13
Payer: MEDICARE

## 2018-10-13 PROCEDURE — 93970 EXTREMITY STUDY: CPT

## 2018-10-17 ENCOUNTER — HOSPITAL ENCOUNTER (OUTPATIENT)
Dept: PREADMISSION TESTING | Age: 78
Discharge: HOME | End: 2018-10-21
Payer: MEDICARE

## 2018-10-17 VITALS
SYSTOLIC BLOOD PRESSURE: 129 MMHG | OXYGEN SATURATION: 95 % | DIASTOLIC BLOOD PRESSURE: 69 MMHG | WEIGHT: 264 LBS | TEMPERATURE: 97.3 F | HEART RATE: 75 BPM | RESPIRATION RATE: 18 BRPM | BODY MASS INDEX: 33.88 KG/M2 | HEIGHT: 74 IN

## 2018-10-17 LAB
ANION GAP SERPL CALCULATED.3IONS-SCNC: 11 MMOL/L (ref 9–17)
BUN BLDV-MCNC: 18 MG/DL (ref 8–23)
CHLORIDE BLD-SCNC: 97 MMOL/L (ref 98–107)
CO2: 31 MMOL/L (ref 20–31)
CREAT SERPL-MCNC: 0.94 MG/DL (ref 0.7–1.2)
GFR AFRICAN AMERICAN: >60 ML/MIN
GFR NON-AFRICAN AMERICAN: >60 ML/MIN
GFR SERPL CREATININE-BSD FRML MDRD: NORMAL ML/MIN/{1.73_M2}
GFR SERPL CREATININE-BSD FRML MDRD: NORMAL ML/MIN/{1.73_M2}
GLUCOSE BLD-MCNC: 98 MG/DL (ref 70–99)
HCT VFR BLD CALC: 40.7 % (ref 40.7–50.3)
HEMOGLOBIN: 12.8 G/DL (ref 13–17)
POTASSIUM SERPL-SCNC: 4 MMOL/L (ref 3.7–5.3)
SODIUM BLD-SCNC: 139 MMOL/L (ref 135–144)

## 2018-10-17 PROCEDURE — 87086 URINE CULTURE/COLONY COUNT: CPT

## 2018-10-17 PROCEDURE — 86901 BLOOD TYPING SEROLOGIC RH(D): CPT

## 2018-10-17 PROCEDURE — 86850 RBC ANTIBODY SCREEN: CPT

## 2018-10-17 PROCEDURE — 84520 ASSAY OF UREA NITROGEN: CPT

## 2018-10-17 PROCEDURE — 82947 ASSAY GLUCOSE BLOOD QUANT: CPT

## 2018-10-17 PROCEDURE — 86900 BLOOD TYPING SEROLOGIC ABO: CPT

## 2018-10-17 PROCEDURE — 85014 HEMATOCRIT: CPT

## 2018-10-17 PROCEDURE — 85018 HEMOGLOBIN: CPT

## 2018-10-17 PROCEDURE — 82565 ASSAY OF CREATININE: CPT

## 2018-10-17 PROCEDURE — 80051 ELECTROLYTE PANEL: CPT

## 2018-10-17 RX ORDER — SODIUM CHLORIDE, SODIUM LACTATE, POTASSIUM CHLORIDE, CALCIUM CHLORIDE 600; 310; 30; 20 MG/100ML; MG/100ML; MG/100ML; MG/100ML
1000 INJECTION, SOLUTION INTRAVENOUS CONTINUOUS
Status: CANCELLED | OUTPATIENT
Start: 2018-10-17

## 2018-10-17 RX ORDER — DOXYCYCLINE HYCLATE 100 MG
100 TABLET ORAL 2 TIMES DAILY
Status: ON HOLD | COMMUNITY
Start: 2018-10-12 | End: 2018-10-25

## 2018-10-17 NOTE — ANESTHESIA PRE-OP
Anesthesia Focused Assessment    STOP-BANG Sleep Apnea Questionnaire    Obstructive Sleep Apnea:                                                                 Yes     Machine used:                                                                                  No            SNORE loudly (heard through closed doors)? Yes      TIRED, fatigued, sleepy during daytime? Yes      OBSERVED stopping breathing during sleep? Yes      High blood PRESSURE being treated? Yes      BMI over 35? Yes  AGE over 48? Yes  NECK circumference over 16\"? Yes  GENDER (male)? Yes                High risk 5-8  Intermediate risk 3-4  Low risk 0-2    Total 3  High risk 5-8  Intermediate risk 3-4  Low risk 0-2      Type 1 DM:                                                                                        No    TYPE 2:                                                                                             No    If yes, insulin dependent? No    Coronary Artery Disease :                                                                No        Active smoker                                                                                   No    Drinks Alcohol                                                                                  social    Dentition: Dentures                                                                            Yes              Partial                                                                                                 No    Any loose or missing teeth                                                                 No    Defib / AICD / Pacemaker:                                                               No    Renal Failure: No    If Yes, On dialysis?       Hx of anesthesia complications with Patient                               No    Hx of anesthesia complications with family

## 2018-10-17 NOTE — H&P
History and Physical    Pt Name: Severino Lovell  MRN: [id number]  YOB: 1940  Date of evaluation: 10/17/2018  Primary Care Physician: Herminio Lancaster DO  Patient evaluated at the request of  Dr. Princess Moran     Reason for evaluation:  Right renal cancer   SUBJECTIVE:   History of Chief Complaint:      Fredy Gamboa is a [de-identified] y.o. 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 Quit smoking 1 yr ago after 34 yrs . Past Medical History      has a past medical history of Ambulates with cane; Arrhythmia; Arthritis; At risk for falling; Atrial fibrillation ([address] Atrial flutter ([address] BPH (benign prostatic hyperplasia); Cholelithiases; COPD (chronic obstructive pulmonary disease) ([address] Full dentures; Hard of hearing; Hematuria; History of pneumonia; Hyperlipidemia; Hypertension; Kidney stone; Renal pelvis transitional cell malignant neoplasm ([address] Skin abnormalities; Sleep apnea; and Wears eyeglasses. Past Surgical History   has a past surgical history that includes Colonoscopy; Cataract removal with implant (Bilateral, 09/2013); Total knee arthroplasty (Right, 7-8-14); Total knee arthroplasty (Left, Sept 18, 2014); knee joint manipulation (Left, 11/25/14); other surgical history (Left, 2-10-15); and CYSTOSCOPY INSERTION / REMOVAL STENT / STONE (Right, 9/14/2018).        Medications   Scheduled Meds:  Current Outpatient Rx   Medication Sig Dispense Refill    aspirin 81 MG tablet Take 81 mg by mouth daily      magnesium hydroxide (MILK OF MAGNESIA) 400 MG/5ML suspension Take by mouth daily as needed for Constipation      POTASSIUM CHLORIDE PO Take 20 mEq by mouth daily      doxycycline hyclate (VIBRA-TABS) 100 MG tablet Take 100 mg by mouth 2 times daily      furosemide (LASIX) 20 MG tablet Take 20 mg by mouth 2 times daily      albuterol sulfate  (90 Base) MCG/ACT inhaler

## 2018-10-18 LAB
CULTURE: NORMAL
Lab: NORMAL
SPECIMEN DESCRIPTION: NORMAL
STATUS: NORMAL

## 2018-10-25 ENCOUNTER — APPOINTMENT (OUTPATIENT)
Dept: CT IMAGING | Age: 78
DRG: 181 | End: 2018-10-25
Attending: FAMILY MEDICINE
Payer: MEDICARE

## 2018-10-25 ENCOUNTER — HOSPITAL ENCOUNTER (INPATIENT)
Age: 78
LOS: 3 days | Discharge: HOME HEALTH | DRG: 181 | End: 2018-10-28
Attending: FAMILY MEDICINE | Admitting: FAMILY MEDICINE
Payer: MEDICARE

## 2018-10-25 PROBLEM — J90 PLEURAL EFFUSION: Status: ACTIVE | Noted: 2018-10-25

## 2018-10-25 LAB
ABSOLUTE BANDS #: 0.38 K/UL (ref 0–1)
ABSOLUTE EOS #: 0.38 K/UL (ref 0–0.4)
ABSOLUTE IMMATURE GRANULOCYTE: ABNORMAL K/UL (ref 0–0.3)
ABSOLUTE LYMPH #: 0.58 K/UL (ref 1–4.8)
ABSOLUTE MONO #: 1.15 K/UL (ref 0.1–1.3)
ALBUMIN SERPL-MCNC: 4 G/DL (ref 3.5–5.2)
ALBUMIN/GLOBULIN RATIO: ABNORMAL (ref 1–2.5)
ALP BLD-CCNC: 89 U/L (ref 40–129)
ALT SERPL-CCNC: 13 U/L (ref 5–41)
AMYLASE: 47 U/L (ref 28–100)
ANION GAP SERPL CALCULATED.3IONS-SCNC: 10 MMOL/L (ref 9–17)
AST SERPL-CCNC: 19 U/L
ATYPICAL LYMPHOCYTE ABSOLUTE COUNT: 0.1 K/UL
ATYPICAL LYMPHOCYTES: 1 %
BANDS: 4 % (ref 0–10)
BASOPHILS # BLD: 0 % (ref 0–2)
BASOPHILS ABSOLUTE: 0 K/UL (ref 0–0.2)
BILIRUB SERPL-MCNC: 0.86 MG/DL (ref 0.3–1.2)
BILIRUBIN DIRECT: 0.26 MG/DL
BILIRUBIN, INDIRECT: 0.6 MG/DL (ref 0–1)
BNP INTERPRETATION: ABNORMAL
BUN BLDV-MCNC: 20 MG/DL (ref 8–23)
BUN/CREAT BLD: ABNORMAL (ref 9–20)
CALCIUM SERPL-MCNC: 9.6 MG/DL (ref 8.6–10.4)
CHLORIDE BLD-SCNC: 99 MMOL/L (ref 98–107)
CO2: 31 MMOL/L (ref 20–31)
CREAT SERPL-MCNC: 0.83 MG/DL (ref 0.7–1.2)
DIFFERENTIAL TYPE: ABNORMAL
EOSINOPHILS RELATIVE PERCENT: 4 % (ref 0–4)
GFR AFRICAN AMERICAN: >60 ML/MIN
GFR NON-AFRICAN AMERICAN: >60 ML/MIN
GFR SERPL CREATININE-BSD FRML MDRD: ABNORMAL ML/MIN/{1.73_M2}
GFR SERPL CREATININE-BSD FRML MDRD: ABNORMAL ML/MIN/{1.73_M2}
GLOBULIN: ABNORMAL G/DL (ref 1.5–3.8)
GLUCOSE BLD-MCNC: 102 MG/DL (ref 70–99)
HCT VFR BLD CALC: 39.6 % (ref 41–53)
HEMOGLOBIN: 13.2 G/DL (ref 13.5–17.5)
IMMATURE GRANULOCYTES: ABNORMAL %
LIPASE: 14 U/L (ref 13–60)
LYMPHOCYTES # BLD: 6 % (ref 24–44)
MCH RBC QN AUTO: 32.3 PG (ref 26–34)
MCHC RBC AUTO-ENTMCNC: 33.3 G/DL (ref 31–37)
MCV RBC AUTO: 96.8 FL (ref 80–100)
MONOCYTES # BLD: 12 % (ref 1–7)
MORPHOLOGY: NORMAL
MYELOCYTES ABSOLUTE COUNT: 0.1 K/UL
MYELOCYTES: 1 %
MYOGLOBIN: 67 NG/ML (ref 28–72)
NRBC AUTOMATED: ABNORMAL PER 100 WBC
PDW BLD-RTO: 13.1 % (ref 11.5–14.9)
PLATELET # BLD: 175 K/UL (ref 150–450)
PLATELET ESTIMATE: ABNORMAL
PMV BLD AUTO: 8.8 FL (ref 6–12)
POTASSIUM SERPL-SCNC: 4.1 MMOL/L (ref 3.7–5.3)
PRO-BNP: 1754 PG/ML
RBC # BLD: 4.09 M/UL (ref 4.5–5.9)
RBC # BLD: ABNORMAL 10*6/UL
SEG NEUTROPHILS: 72 % (ref 36–66)
SEGMENTED NEUTROPHILS ABSOLUTE COUNT: 6.91 K/UL (ref 1.3–9.1)
SODIUM BLD-SCNC: 140 MMOL/L (ref 135–144)
TOTAL PROTEIN: 8.4 G/DL (ref 6.4–8.3)
TROPONIN INTERP: NORMAL
TROPONIN T: <0.03 NG/ML
WBC # BLD: 9.6 K/UL (ref 3.5–11)
WBC # BLD: ABNORMAL 10*3/UL

## 2018-10-25 PROCEDURE — 84484 ASSAY OF TROPONIN QUANT: CPT

## 2018-10-25 PROCEDURE — 83874 ASSAY OF MYOGLOBIN: CPT

## 2018-10-25 PROCEDURE — 82150 ASSAY OF AMYLASE: CPT

## 2018-10-25 PROCEDURE — 83690 ASSAY OF LIPASE: CPT

## 2018-10-25 PROCEDURE — 83880 ASSAY OF NATRIURETIC PEPTIDE: CPT

## 2018-10-25 PROCEDURE — 94760 N-INVAS EAR/PLS OXIMETRY 1: CPT

## 2018-10-25 PROCEDURE — 80076 HEPATIC FUNCTION PANEL: CPT

## 2018-10-25 PROCEDURE — 2580000003 HC RX 258: Performed by: FAMILY MEDICINE

## 2018-10-25 PROCEDURE — 6370000000 HC RX 637 (ALT 250 FOR IP): Performed by: FAMILY MEDICINE

## 2018-10-25 PROCEDURE — 6360000002 HC RX W HCPCS: Performed by: FAMILY MEDICINE

## 2018-10-25 PROCEDURE — 80048 BASIC METABOLIC PNL TOTAL CA: CPT

## 2018-10-25 PROCEDURE — 94640 AIRWAY INHALATION TREATMENT: CPT

## 2018-10-25 PROCEDURE — 85025 COMPLETE CBC W/AUTO DIFF WBC: CPT

## 2018-10-25 PROCEDURE — G0378 HOSPITAL OBSERVATION PER HR: HCPCS

## 2018-10-25 PROCEDURE — 96375 TX/PRO/DX INJ NEW DRUG ADDON: CPT

## 2018-10-25 PROCEDURE — 36415 COLL VENOUS BLD VENIPUNCTURE: CPT

## 2018-10-25 PROCEDURE — 2060000000 HC ICU INTERMEDIATE R&B

## 2018-10-25 PROCEDURE — 71260 CT THORAX DX C+: CPT

## 2018-10-25 PROCEDURE — 6360000004 HC RX CONTRAST MEDICATION: Performed by: FAMILY MEDICINE

## 2018-10-25 RX ORDER — SODIUM CHLORIDE 0.9 % (FLUSH) 0.9 %
10 SYRINGE (ML) INJECTION PRN
Status: DISCONTINUED | OUTPATIENT
Start: 2018-10-25 | End: 2018-10-28 | Stop reason: HOSPADM

## 2018-10-25 RX ORDER — METHYLPREDNISOLONE SODIUM SUCCINATE 40 MG/ML
30 INJECTION, POWDER, LYOPHILIZED, FOR SOLUTION INTRAMUSCULAR; INTRAVENOUS EVERY 8 HOURS
Status: DISCONTINUED | OUTPATIENT
Start: 2018-10-25 | End: 2018-10-27

## 2018-10-25 RX ORDER — IPRATROPIUM BROMIDE AND ALBUTEROL SULFATE 2.5; .5 MG/3ML; MG/3ML
1 SOLUTION RESPIRATORY (INHALATION)
Status: DISCONTINUED | OUTPATIENT
Start: 2018-10-25 | End: 2018-10-28 | Stop reason: HOSPADM

## 2018-10-25 RX ORDER — FUROSEMIDE 10 MG/ML
20 INJECTION INTRAMUSCULAR; INTRAVENOUS 2 TIMES DAILY
Status: DISCONTINUED | OUTPATIENT
Start: 2018-10-25 | End: 2018-10-28 | Stop reason: HOSPADM

## 2018-10-25 RX ORDER — 0.9 % SODIUM CHLORIDE 0.9 %
80 INTRAVENOUS SOLUTION INTRAVENOUS ONCE
Status: COMPLETED | OUTPATIENT
Start: 2018-10-25 | End: 2018-10-25

## 2018-10-25 RX ORDER — ACETAMINOPHEN 325 MG/1
650 TABLET ORAL EVERY 4 HOURS PRN
Status: DISCONTINUED | OUTPATIENT
Start: 2018-10-25 | End: 2018-10-28 | Stop reason: HOSPADM

## 2018-10-25 RX ADMIN — FUROSEMIDE 20 MG: 10 INJECTION, SOLUTION INTRAMUSCULAR; INTRAVENOUS at 22:47

## 2018-10-25 RX ADMIN — Medication 10 ML: at 21:58

## 2018-10-25 RX ADMIN — METHYLPREDNISOLONE SODIUM SUCCINATE 30 MG: 40 INJECTION, POWDER, FOR SOLUTION INTRAMUSCULAR; INTRAVENOUS at 22:47

## 2018-10-25 RX ADMIN — SODIUM CHLORIDE 80 ML: 9 INJECTION, SOLUTION INTRAVENOUS at 21:58

## 2018-10-25 RX ADMIN — IPRATROPIUM BROMIDE AND ALBUTEROL SULFATE 1 AMPULE: .5; 3 SOLUTION RESPIRATORY (INHALATION) at 20:53

## 2018-10-25 RX ADMIN — IOPAMIDOL 75 ML: 755 INJECTION, SOLUTION INTRAVENOUS at 21:58

## 2018-10-26 ENCOUNTER — APPOINTMENT (OUTPATIENT)
Dept: ULTRASOUND IMAGING | Age: 78
DRG: 181 | End: 2018-10-26
Attending: FAMILY MEDICINE
Payer: MEDICARE

## 2018-10-26 ENCOUNTER — APPOINTMENT (OUTPATIENT)
Dept: GENERAL RADIOLOGY | Age: 78
DRG: 181 | End: 2018-10-26
Attending: FAMILY MEDICINE
Payer: MEDICARE

## 2018-10-26 LAB
ABSOLUTE EOS #: 0 K/UL (ref 0–0.4)
ABSOLUTE IMMATURE GRANULOCYTE: ABNORMAL K/UL (ref 0–0.3)
ABSOLUTE LYMPH #: 0.4 K/UL (ref 1–4.8)
ABSOLUTE MONO #: 0.3 K/UL (ref 0.1–1.3)
ANION GAP SERPL CALCULATED.3IONS-SCNC: 12 MMOL/L (ref 9–17)
BASOPHILS # BLD: 0 % (ref 0–2)
BASOPHILS ABSOLUTE: 0 K/UL (ref 0–0.2)
BUN BLDV-MCNC: 19 MG/DL (ref 8–23)
BUN/CREAT BLD: ABNORMAL (ref 9–20)
CALCIUM SERPL-MCNC: 9.3 MG/DL (ref 8.6–10.4)
CHLORIDE BLD-SCNC: 96 MMOL/L (ref 98–107)
CO2: 29 MMOL/L (ref 20–31)
CREAT SERPL-MCNC: 0.88 MG/DL (ref 0.7–1.2)
DIFFERENTIAL TYPE: ABNORMAL
EOSINOPHILS RELATIVE PERCENT: 0 % (ref 0–4)
GFR AFRICAN AMERICAN: >60 ML/MIN
GFR NON-AFRICAN AMERICAN: >60 ML/MIN
GFR SERPL CREATININE-BSD FRML MDRD: ABNORMAL ML/MIN/{1.73_M2}
GFR SERPL CREATININE-BSD FRML MDRD: ABNORMAL ML/MIN/{1.73_M2}
GLUCOSE BLD-MCNC: 189 MG/DL (ref 70–99)
HCT VFR BLD CALC: 36.8 % (ref 41–53)
HEMOGLOBIN: 12.3 G/DL (ref 13.5–17.5)
IMMATURE GRANULOCYTES: ABNORMAL %
INR BLD: 1.1
LYMPHOCYTES # BLD: 5 % (ref 24–44)
MCH RBC QN AUTO: 31.9 PG (ref 26–34)
MCHC RBC AUTO-ENTMCNC: 33.4 G/DL (ref 31–37)
MCV RBC AUTO: 95.4 FL (ref 80–100)
MONOCYTES # BLD: 3 % (ref 1–7)
NRBC AUTOMATED: ABNORMAL PER 100 WBC
PARTIAL THROMBOPLASTIN TIME: 31.4 SEC (ref 23–31)
PDW BLD-RTO: 13.1 % (ref 11.5–14.9)
PLATELET # BLD: 157 K/UL (ref 150–450)
PLATELET ESTIMATE: ABNORMAL
PMV BLD AUTO: 9 FL (ref 6–12)
POTASSIUM SERPL-SCNC: 3.8 MMOL/L (ref 3.7–5.3)
PROTHROMBIN TIME: 11 SEC (ref 9.7–12)
RBC # BLD: 3.86 M/UL (ref 4.5–5.9)
RBC # BLD: ABNORMAL 10*6/UL
SEG NEUTROPHILS: 92 % (ref 36–66)
SEGMENTED NEUTROPHILS ABSOLUTE COUNT: 6.9 K/UL (ref 1.3–9.1)
SODIUM BLD-SCNC: 137 MMOL/L (ref 135–144)
WBC # BLD: 7.5 K/UL (ref 3.5–11)
WBC # BLD: ABNORMAL 10*3/UL

## 2018-10-26 PROCEDURE — 85025 COMPLETE CBC W/AUTO DIFF WBC: CPT

## 2018-10-26 PROCEDURE — 2060000000 HC ICU INTERMEDIATE R&B

## 2018-10-26 PROCEDURE — 96367 TX/PROPH/DG ADDL SEQ IV INF: CPT

## 2018-10-26 PROCEDURE — 2580000003 HC RX 258: Performed by: FAMILY MEDICINE

## 2018-10-26 PROCEDURE — 6370000000 HC RX 637 (ALT 250 FOR IP): Performed by: FAMILY MEDICINE

## 2018-10-26 PROCEDURE — 85730 THROMBOPLASTIN TIME PARTIAL: CPT

## 2018-10-26 PROCEDURE — 94640 AIRWAY INHALATION TREATMENT: CPT

## 2018-10-26 PROCEDURE — 80048 BASIC METABOLIC PNL TOTAL CA: CPT

## 2018-10-26 PROCEDURE — 2700000000 HC OXYGEN THERAPY PER DAY

## 2018-10-26 PROCEDURE — 71046 X-RAY EXAM CHEST 2 VIEWS: CPT

## 2018-10-26 PROCEDURE — 36415 COLL VENOUS BLD VENIPUNCTURE: CPT

## 2018-10-26 PROCEDURE — 96365 THER/PROPH/DIAG IV INF INIT: CPT

## 2018-10-26 PROCEDURE — 76604 US EXAM CHEST: CPT

## 2018-10-26 PROCEDURE — 6360000002 HC RX W HCPCS: Performed by: FAMILY MEDICINE

## 2018-10-26 PROCEDURE — 96376 TX/PRO/DX INJ SAME DRUG ADON: CPT

## 2018-10-26 PROCEDURE — 94761 N-INVAS EAR/PLS OXIMETRY MLT: CPT

## 2018-10-26 PROCEDURE — 99222 1ST HOSP IP/OBS MODERATE 55: CPT | Performed by: INTERNAL MEDICINE

## 2018-10-26 PROCEDURE — 85610 PROTHROMBIN TIME: CPT

## 2018-10-26 RX ORDER — METOPROLOL TARTRATE AND HYDROCHLOROTHIAZIDE 50; 25 MG/1; MG/1
1 TABLET ORAL DAILY
Status: DISCONTINUED | OUTPATIENT
Start: 2018-10-26 | End: 2018-10-26 | Stop reason: CLARIF

## 2018-10-26 RX ORDER — HYDROCHLOROTHIAZIDE 25 MG/1
25 TABLET ORAL DAILY
Status: DISCONTINUED | OUTPATIENT
Start: 2018-10-26 | End: 2018-10-26

## 2018-10-26 RX ORDER — FINASTERIDE 5 MG/1
5 TABLET, FILM COATED ORAL DAILY
Status: DISCONTINUED | OUTPATIENT
Start: 2018-10-26 | End: 2018-10-28 | Stop reason: HOSPADM

## 2018-10-26 RX ORDER — METOPROLOL TARTRATE 50 MG/1
50 TABLET, FILM COATED ORAL DAILY
Status: DISCONTINUED | OUTPATIENT
Start: 2018-10-26 | End: 2018-10-28 | Stop reason: HOSPADM

## 2018-10-26 RX ORDER — ALBUTEROL SULFATE 90 UG/1
2 AEROSOL, METERED RESPIRATORY (INHALATION) 4 TIMES DAILY PRN
Status: DISCONTINUED | OUTPATIENT
Start: 2018-10-26 | End: 2018-10-28 | Stop reason: HOSPADM

## 2018-10-26 RX ORDER — TAMSULOSIN HYDROCHLORIDE 0.4 MG/1
0.8 CAPSULE ORAL NIGHTLY
Status: DISCONTINUED | OUTPATIENT
Start: 2018-10-26 | End: 2018-10-28 | Stop reason: HOSPADM

## 2018-10-26 RX ORDER — ATORVASTATIN CALCIUM 40 MG/1
40 TABLET, FILM COATED ORAL NIGHTLY
Status: DISCONTINUED | OUTPATIENT
Start: 2018-10-26 | End: 2018-10-28 | Stop reason: HOSPADM

## 2018-10-26 RX ORDER — POTASSIUM CHLORIDE 20 MEQ/1
20 TABLET, EXTENDED RELEASE ORAL
Status: DISCONTINUED | OUTPATIENT
Start: 2018-10-26 | End: 2018-10-28 | Stop reason: HOSPADM

## 2018-10-26 RX ORDER — LIDOCAINE HYDROCHLORIDE 20 MG/ML
INJECTION, SOLUTION INFILTRATION; PERINEURAL
Status: DISCONTINUED
Start: 2018-10-26 | End: 2018-10-26 | Stop reason: WASHOUT

## 2018-10-26 RX ADMIN — ATORVASTATIN CALCIUM 40 MG: 40 TABLET, FILM COATED ORAL at 20:04

## 2018-10-26 RX ADMIN — Medication 10 ML: at 20:04

## 2018-10-26 RX ADMIN — IPRATROPIUM BROMIDE AND ALBUTEROL SULFATE 1 AMPULE: .5; 3 SOLUTION RESPIRATORY (INHALATION) at 15:26

## 2018-10-26 RX ADMIN — MOMETASONE FUROATE AND FORMOTEROL FUMARATE DIHYDRATE 2 PUFF: 200; 5 AEROSOL RESPIRATORY (INHALATION) at 08:43

## 2018-10-26 RX ADMIN — AZITHROMYCIN MONOHYDRATE 500 MG: 500 INJECTION, POWDER, LYOPHILIZED, FOR SOLUTION INTRAVENOUS at 03:30

## 2018-10-26 RX ADMIN — IPRATROPIUM BROMIDE AND ALBUTEROL SULFATE 1 AMPULE: .5; 3 SOLUTION RESPIRATORY (INHALATION) at 11:24

## 2018-10-26 RX ADMIN — METOPROLOL TARTRATE 50 MG: 50 TABLET ORAL at 08:43

## 2018-10-26 RX ADMIN — FUROSEMIDE 20 MG: 10 INJECTION, SOLUTION INTRAMUSCULAR; INTRAVENOUS at 08:40

## 2018-10-26 RX ADMIN — IPRATROPIUM BROMIDE AND ALBUTEROL SULFATE 1 AMPULE: .5; 3 SOLUTION RESPIRATORY (INHALATION) at 20:49

## 2018-10-26 RX ADMIN — METHYLPREDNISOLONE SODIUM SUCCINATE 30 MG: 40 INJECTION, POWDER, FOR SOLUTION INTRAMUSCULAR; INTRAVENOUS at 13:40

## 2018-10-26 RX ADMIN — CEFTRIAXONE SODIUM 1 G: 1 INJECTION, POWDER, FOR SOLUTION INTRAMUSCULAR; INTRAVENOUS at 00:29

## 2018-10-26 RX ADMIN — METHYLPREDNISOLONE SODIUM SUCCINATE 30 MG: 40 INJECTION, POWDER, FOR SOLUTION INTRAMUSCULAR; INTRAVENOUS at 03:38

## 2018-10-26 RX ADMIN — IPRATROPIUM BROMIDE AND ALBUTEROL SULFATE 1 AMPULE: .5; 3 SOLUTION RESPIRATORY (INHALATION) at 07:53

## 2018-10-26 RX ADMIN — TAMSULOSIN HYDROCHLORIDE 0.8 MG: 0.4 CAPSULE ORAL at 20:04

## 2018-10-26 RX ADMIN — POTASSIUM CHLORIDE 20 MEQ: 20 TABLET, EXTENDED RELEASE ORAL at 08:43

## 2018-10-26 RX ADMIN — METHYLPREDNISOLONE SODIUM SUCCINATE 30 MG: 40 INJECTION, POWDER, FOR SOLUTION INTRAMUSCULAR; INTRAVENOUS at 18:11

## 2018-10-26 RX ADMIN — FINASTERIDE 5 MG: 5 TABLET, FILM COATED ORAL at 08:43

## 2018-10-26 RX ADMIN — Medication 10 ML: at 13:41

## 2018-10-26 RX ADMIN — MOMETASONE FUROATE AND FORMOTEROL FUMARATE DIHYDRATE 2 PUFF: 200; 5 AEROSOL RESPIRATORY (INHALATION) at 20:14

## 2018-10-26 RX ADMIN — FUROSEMIDE 20 MG: 10 INJECTION, SOLUTION INTRAMUSCULAR; INTRAVENOUS at 18:11

## 2018-10-26 ASSESSMENT — ENCOUNTER SYMPTOMS
SINUS PAIN: 0
COUGH: 0
VOMITING: 0
BLURRED VISION: 0
NAUSEA: 0
ABDOMINAL PAIN: 0
DOUBLE VISION: 0

## 2018-10-26 NOTE — CONSULTS
Dictated    Left pleural effusion, small  Multiple pulmonary nodules due to metastatic disease  COPD      Plan  Diagnostic thoracentesis if there is enough fluid  Continue current COPD meds, not actively wheezing don't think he needs steroids  Currently does not appear to have pneumonia

## 2018-10-26 NOTE — CARE COORDINATION
CASE MANAGEMENT NOTE:    Admission Date:  10/25/2018 Stan Bonds is a [de-identified] y.o.  male    Admitted for : Pleural effusion on left [J90]  Renal cell cancer ([address] [C64.9]  Pleural effusion [J90]    Met with:  Patient    PCP:  Dr. Naomi Parra:  [place] [facility]      Current Residence/ Living Arrangements:  independently at home             Current Services PTA:  No    Is patient agreeable to VNS: Yes    Freedom of choice provided: Yes    List of [address] provided: Yes    VNS chosen:  NA    DME:  straight cane, walker and wheelchair    Home Oxygen: No    Nebulizer: No    CPAP/BIPAP: NA    Supplier: N/A    Potential Assistance Needed: No    SNF needed: No    Ph[facility] [address]       Is the Patient an Asseta with Readmission Risk Score greater than 14%? NA  If yes, pt needs a follow up appointment made within 7 days. Does Patient want to use MEDS to BEDS? No    Family Members/Caregivers that pt would like involved in their care:    Yes    If yes, list name here:  [address]    Transportation Provider:  Family                      Discharge Plan:  10/26/18 [employer] Pt is from home with his family in a one story home he has a walker, cane and wheelchair pt is agreeable to vns SARA is started and needs signed will continue to follow for needs . //tv                  Electronically signed by:  Mei Bravo RN on 10/26/2018 at 11:37 AM

## 2018-10-26 NOTE — H&P
CYSTOSCOPY INSERTION / REMOVAL STENT / STONE Right 9/14/2018    CYSTO URETEROSCOPY  W/STENT INSERTION &  RENAL PELVIC  BIOPSY AND RETROGRADE PYLEOGRAM performed by Saray Henry MD at [address] Left 11/25/14    OTHER SURGICAL HISTORY Left 2-10-15    Knee arthroplasty revision- poly exchange.  TOTAL KNEE ARTHROPLASTY Right 7-8-14    TOTAL KNEE ARTHROPLASTY Left Sept 18, 2014       FAMILY HISTORY       Family History   Problem Relation Age of Onset    Colon Cancer Brother     Diabetes Mother     Other Father         black lung disease    No Known Problems Maternal Grandmother     No Known Problems Maternal Grandfather     No Known Problems Paternal Grandmother     No Known Problems Paternal Grandfather     Breast Cancer Sister     No Known Problems Sister        SOCIAL HISTORY       Social History     Social History    Marital status:      Spouse name: Kristina Shannon Number of children: 3    Years of education: N/A     Occupational History    retired [employer]     Social History Main Topics    Smoking status: Former Smoker     Packs/day: 0.50     Years: 60.00     Start date: 1948     Quit date: 4/17/2017    Smokeless tobacco: Never Used    Alcohol use No      Comment: quit in 2000    Drug use: No    Sexual activity: Yes     Partners: Female     Other Topics Concern    None     Social History Narrative    None           REVIEW OF SYSTEMS      Allergies   Allergen Reactions    Oxycodone Hcl      confusion    Other      Rayne Glasgow is \"allergic to all pain pills except for the one he is on now---NORCO. \"       No current facility-administered medications on file prior to encounter.       Current Outpatient Prescriptions on File Prior to Encounter   Medication Sig Dispense Refill    aspirin 81 MG tablet Take 81 mg by mouth daily      POTASSIUM CHLORIDE PO Take 20 mEq by mouth daily      furosemide (LASIX) 20 MG tablet Take 20 mg by mouth 2 times daily      albuterol sulfate  (90 Base) MCG/ACT inhaler Inhale 2 puffs into the lungs 4 times daily as needed for Wheezing      mometasone-formoterol (DULERA) 200-5 MCG/ACT inhaler Inhale 2 puffs into the lungs every 12 hours      finasteride (PROSCAR) 5 MG tablet Take 1 tablet by mouth daily 30 tablet 11    atorvastatin (LIPITOR) 40 MG tablet Take 40 mg by mouth nightly.  metoprolol-hydrochlorothiazide (LOPRESSOR HCT) 50-25 MG per tablet Take 1 tablet by mouth daily.  tamsulosin (FLOMAX) 0.4 MG capsule Take 0.8 mg by mouth nightly Takes 2 tablets to equal 0.8 mg      magnesium hydroxide (MILK OF MAGNESIA) 400 MG/5ML suspension Take by mouth daily as needed for Constipation        Review of Systems   Constitutional: Negative for chills, fever, malaise/fatigue and weight loss. HENT: Negative for congestion, ear discharge, hearing loss, nosebleeds and sinus pain. Eyes: Negative for blurred vision and double vision. Respiratory: Negative for cough. Cardiovascular: Negative for chest pain. He sees Dr Tanner Nicole for A Fib,  No recent chest pain   Gastrointestinal: Negative for abdominal pain, nausea and vomiting. Genitourinary: Negative for dysuria and urgency. Musculoskeletal:        Bilateral knee replacements and did well,    Skin: Negative for itching and rash. Neurological: Negative for dizziness, tremors and headaches. Endo/Heme/Allergies: Does not bruise/bleed easily. Psychiatric/Behavioral: Negative for depression, substance abuse and suicidal ideas.  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 Has Newly found lung masses, Renal carcinoma*    GENERAL PHYSICAL EXAM:         Vitals: /82   Pulse 79   Temp 97.7 °F (36.5 °C) (Oral)   Resp 18   Ht 6' 2.02\" (1.88 m)   Wt 263 lb 14.3 oz (119.7 kg)   SpO2 100%   BMI 33.87 kg/m²  Body mass index is 33.87 kg/m².      GENERAL APPEARANCE:  Severino Lovell is [de-identified] y.o.,  male,

## 2018-10-26 NOTE — CONSULTS
arthroplasty (Right, 7-8-14); Total knee arthroplasty (Left, Sept 18, 2014); knee joint manipulation (Left, 11/25/14); other surgical history (Left, 2-10-15); and CYSTOSCOPY INSERTION / REMOVAL STENT / STONE (Right, 9/14/2018). Medications:    Prior to Admission medications    Medication Sig Start Date End Date Taking? Authorizing Provider   aspirin 81 MG tablet Take 81 mg by mouth daily   Yes Historical Provider, MD   POTASSIUM CHLORIDE PO Take 20 mEq by mouth daily   Yes Historical Provider, MD   furosemide (LASIX) 20 MG tablet Take 20 mg by mouth 2 times daily   Yes Historical Provider, MD   albuterol sulfate  (90 Base) MCG/ACT inhaler Inhale 2 puffs into the lungs 4 times daily as needed for Wheezing   Yes Historical Provider, MD   mometasone-formoterol (DULERA) 200-5 MCG/ACT inhaler Inhale 2 puffs into the lungs every 12 hours   Yes Historical Provider, MD   finasteride (PROSCAR) 5 MG tablet Take 1 tablet by mouth daily 4/19/18  Yes Chris Ball MD   atorvastatin (LIPITOR) 40 MG tablet Take 40 mg by mouth nightly. Yes Historical Provider, MD   metoprolol-hydrochlorothiazide (LOPRESSOR HCT) 50-25 MG per tablet Take 1 tablet by mouth daily.    Yes Historical Provider, MD   tamsulosin (FLOMAX) 0.4 MG capsule Take 0.8 mg by mouth nightly Takes 2 tablets to equal 0.8 mg   Yes Historical Provider, MD   magnesium hydroxide (MILK OF MAGNESIA) 400 MG/5ML suspension Take by mouth daily as needed for Constipation    Historical Provider, MD     Current Facility-Administered Medications   Medication Dose Route Frequency Provider Last Rate Last Dose    albuterol sulfate  (90 Base) MCG/ACT inhaler 2 puff  2 puff Inhalation 4x Daily PRN Melchor T Erich, DO        atorvastatin (LIPITOR) tablet 40 mg  40 mg Oral Nightly Melchor T Erich, DO        finasteride (PROSCAR) tablet 5 mg  5 mg Oral Daily Melchor T Erich, DO   5 mg at 10/26/18 0843    tamsulosin (FLOMAX) capsule 0.8 mg  0.8 mg Oral Nightly Melchor T Erich, DO        potassium chloride (KLOR-CON M) extended release tablet 20 mEq  20 mEq Oral Daily with breakfast Robbi Henry T Erich, DO   20 mEq at 10/26/18 0843    metoprolol tartrate (LOPRESSOR) tablet 50 mg  50 mg Oral Daily [facility] Erich, DO   50 mg at 10/26/18 0843    mometasone-formoterol (DULERA) 200-5 MCG/ACT inhaler 2 puff  2 puff Inhalation Q12H [facility] Erich, DO   2 puff at 10/26/18 0843    furosemide (LASIX) injection 20 mg  20 mg Intravenous BID [facility] Erich, DO   20 mg at 10/26/18 0840    ipratropium-albuterol (DUONEB) nebulizer solution 1 ampule  1 ampule Inhalation Q4H [place] [facility] Erich, DO   1 ampule at 10/26/18 0753    methylPREDNISolone sodium (SOLU-MEDROL) injection 30 mg  30 mg Intravenous Q8H [facility] Erich, DO   30 mg at 10/26/18 0338    acetaminophen (TYLENOL) tablet 650 mg  650 mg Oral Q4H PRN Everlena Mash, DO        sodium chloride flush 0.9 % injection 10 mL  10 mL Intravenous PRN Everlena Mash, DO   10 mL at 10/25/18 2158    cefTRIAXone (ROCEPHIN) 1 g IVPB in 50 mL D5W minibag  1 g Intravenous Q24H [facility] Erich, DO   Stopped at 10/26/18 0059    azithromycin (ZITHROMAX) 500 mg in D5W 250ml addavial  500 mg Intravenous Q24H [facility] Erich, DO   Stopped at 10/26/18 0430       Allergies:  Oxycodone hcl and Other    Social History:   reports that he quit smoking about 18 months ago. He started smoking about 70 years ago. He has a 30.00 pack-year smoking history. He has never used smokeless tobacco. He reports that he does not drink alcohol or use drugs. Family History: family history includes Breast Cancer in his sister; Colon Cancer in his brother; Diabetes in his mother; No Known Problems in his maternal grandfather, maternal grandmother, paternal grandfather, paternal grandmother, and sister; Other in his father. REVIEW OF SYSTEMS:    Constitutional: No fever or chills.  No night sweats, no weight loss   Eyes: No eye discharge, double vision, or eye pain   HEENT: negative for Problem  <principal problem not specified>    Active Hospital Problems    Diagnosis Date Noted    Pleural effusion [J90] 10/25/2018     IMPRESSION:   1. Metastatic urothelial carcinoma: Based on the CT findings thisi is most c/w metastatic disease from his urothelial cancer. He is not a candidate for surgery as this is advanced disease and would need systemic treatment. 2. Left pleural effusion: will need thoracentesis and send to cytology if possible. Pulmonary to see pt today  3. SOB  4. hematuria    RECOMMENDATIONS:  1. Thoracentesis if enough fluid and cytology. Pulmonary to see pt today  2. I do not think he need lung biopsy  3. I do not recommend surgery as he appears  to have advanced disease  4. Pt would need systemic therapy as o/p and immunotherapy option can be discussed with pt   5. I discussed the CT scans findings with patient his son and pt's wife. Discussed with patient and family and Nurse. Thank you for asking us to see this patient.     Perry Doan MD   Hematologist/Medical Oncologist  Cell: [phone]

## 2018-10-26 NOTE — PROGRESS NOTES
DR Artem Oneill                                                                       PROGRESS NOTE        Patient:  Meg Olson  YOB: 1940    MRN: [id number]     Acct: [de-identified]     Admit date: 10/25/2018    Pt seen and Chart reviewed. Consultant notes reviewed and care evaluated. Subjective: ppatient is an 64-year-old white female who was recently diagnosed with renal cell CVA was planned to have nephrectomy on Monday by Doctor Raheel Jenkins., Tabby Worthy was going through his preop clearance seen his cardiologist and  Had stress test done which was normal just a chronic in his old infarct he was noted to have some wheezing on Tuesday when he seen Dr. Nicole Colindres wife called on Wednesday and we had him come in yesterday patient had some stridor but expiratory wheezes but his sats were 97% chest x-ray in the office showed a new left pleural effusion with some calcification on the left lungs that was suspicious for metastasis patient was admitted for further workup and testing. Had a CT last night was confirmed with effusion as well as nodular masses throughout his lung fields suspicious for metastasis. This morning patient feeling okay he has no complaints. He is not chest pain or feels shortness of breath. Before admission he also reported he doesn't feel any different than when he felt before. Plan this morning for IR to do some thoracentesis.     Diet:  Diet NPO Effective Now      Medications:Current Inpatient    Scheduled Meds:   atorvastatin  40 mg Oral Nightly    finasteride  5 mg Oral Daily    tamsulosin  0.8 mg Oral Nightly    potassium chloride  20 mEq Oral Daily with breakfast    metoprolol tartrate  50 mg Oral Daily    And    hydrochlorothiazide  25 mg Oral Daily    mometasone-formoterol  2 puff Inhalation Q12H    furosemide  20 mg Intravenous BID    ipratropium-albuterol  1 ampule Inhalation Q4H WA    methylPREDNISolone hours.  INR: No results for input(s): INR in the last 72 hours. CARDIAC ENZYMES:No results for input(s): CKTOTAL, CKMB, CKMBINDEX, TROPONINI in the last 72 hours. BNP: No results for input(s): BNP in the last 72 hours. Lipids: No results for input(s): CHOL, TRIG, HDL, LDLCALC in the last 72 hours. Invalid input(s): LDL  ABGs: No results found for: PH, PCO2, PO2, HCO3, O2SAT  Thyroid:   Lab Results   Component Value Date    TSH 1.59 05/13/2014      Urinalysis: Clarity, UA   Date Value Ref Range Status   08/03/2018 Cloudy  Final     Color, UA   Date Value Ref Range Status   08/03/2018 Yellow  Final   01/24/2015 YELLOW YEL Final     pH, UA   Date Value Ref Range Status   10/17/2017 -  Final   01/24/2015 7.0 5.0 - 8.0 Final     Specific Gravity, UA   Date Value Ref Range Status   01/24/2015 1.022 1.000 - 1.030 Final     Spec Grav, UA   Date Value Ref Range Status   10/17/2017 -  Final     Protein, UA   Date Value Ref Range Status   01/24/2015 NEGATIVE NEG Final     Protein, UA POC   Date Value Ref Range Status   08/03/2018 Neg  Final     RBC, UA   Date Value Ref Range Status   01/24/2015 2 TO 5 /HPF Final     Blood, UA POC   Date Value Ref Range Status   08/03/2018 Large  Final     Bacteria, UA   Date Value Ref Range Status   01/24/2015 NOT REPORTED NONE Final     Nitrite, Urine   Date Value Ref Range Status   01/24/2015 NEGATIVE NEG Final     WBC, UA   Date Value Ref Range Status   01/24/2015 None /HPF Final     Leukocyte Esterase, Urine   Date Value Ref Range Status   01/24/2015 NEGATIVE NEG Final     Comment:     Performed at [address]   [phone]       Leukocytes, UA   Date Value Ref Range Status   08/03/2018 Neg  Final     Yeast, UA   Date Value Ref Range Status   01/24/2015 NOT REPORTED NONE Final     Glucose, Ur   Date Value Ref Range Status   01/24/2015 NEGATIVE NEG Final     Glucose, UA POC   Date Value Ref Range Status   08/03/2018 Neg  Final

## 2018-10-26 NOTE — DISCHARGE INSTR - COC
Swallow with Video (Video Swallowing Test): not done    Treatments at the Time of Hospital Discharge:   Respiratory Treatments: MDI BID  Oxygen Therapy:  is not on home oxygen therapy. Ventilator:    - No ventilator support    Rehab Therapies: Physical Therapy and Occupational Therapy  Weight Bearing Status/Restrictions: No weight bearing restirctions  Other Medical Equipment (for information only, NOT a DME order):  cane  Other Treatments: skilled nursing assessment per protocol medication education      Patient's personal belongings (please select all that are sent with patient):  Glasses, Dentures upper    RN SIGNATURE:  Electronically signed by Mathilda Councilman, RN on 10/28/18 at 1:05 PM    CASE MANAGEMENT/SOCIAL WORK SECTION    Inpatient Status Date: 10/25/18    Readmission Risk Assessment Score:  Readmission Risk              Risk of Unplanned Readmission:        12           Discharging to Facility/ [address] [address]  Phone [phone]  Fax  [phone]      / signature: Electronically signed by Jenna Gong RN on 10/26/18 at 11:43 AM    PHYSICIAN SECTION    Prognosis: Good    Condition at Discharge: Stable    Rehab Potential (if transferring to Rehab): Good    Recommended Labs or Other Treatments After Discharge: N/A    Physician Certification: I certify the above information and transfer of Elmo Vang  is necessary for the continuing treatment of the diagnosis listed and that he requires [address] for greater 30 days.      Update Admission H&P: No change in H&P    PHYSICIAN SIGNATURE:  Dr. Marleny Sotomayor MD - Verbal Order/Electronically signed by Florecita Sullivan RN on 10/28/2018 at 1:12 PM

## 2018-10-27 LAB
-: ABNORMAL
ABO/RH: NORMAL
ABSOLUTE EOS #: 0 K/UL (ref 0–0.4)
ABSOLUTE IMMATURE GRANULOCYTE: ABNORMAL K/UL (ref 0–0.3)
ABSOLUTE LYMPH #: 0.6 K/UL (ref 1–4.8)
ABSOLUTE MONO #: 1.6 K/UL (ref 0.1–1.3)
AMORPHOUS: ABNORMAL
ANION GAP SERPL CALCULATED.3IONS-SCNC: 15 MMOL/L (ref 9–17)
ANTIBODY SCREEN: NEGATIVE
ARM BAND NUMBER: NORMAL
BACTERIA: ABNORMAL
BASOPHILS # BLD: 0 % (ref 0–2)
BASOPHILS ABSOLUTE: 0 K/UL (ref 0–0.2)
BILIRUBIN URINE: NEGATIVE
BUN BLDV-MCNC: 24 MG/DL (ref 8–23)
BUN/CREAT BLD: ABNORMAL (ref 9–20)
CALCIUM SERPL-MCNC: 9.5 MG/DL (ref 8.6–10.4)
CASTS UA: ABNORMAL /LPF
CHLORIDE BLD-SCNC: 92 MMOL/L (ref 98–107)
CO2: 28 MMOL/L (ref 20–31)
COLOR: YELLOW
COMMENT UA: ABNORMAL
CREAT SERPL-MCNC: 0.98 MG/DL (ref 0.7–1.2)
CRYSTALS, UA: ABNORMAL /HPF
DIFFERENTIAL TYPE: ABNORMAL
EOSINOPHILS RELATIVE PERCENT: 0 % (ref 0–4)
EPITHELIAL CELLS UA: ABNORMAL /HPF
EXPIRATION DATE: NORMAL
GFR AFRICAN AMERICAN: >60 ML/MIN
GFR NON-AFRICAN AMERICAN: >60 ML/MIN
GFR SERPL CREATININE-BSD FRML MDRD: ABNORMAL ML/MIN/{1.73_M2}
GFR SERPL CREATININE-BSD FRML MDRD: ABNORMAL ML/MIN/{1.73_M2}
GLUCOSE BLD-MCNC: 136 MG/DL (ref 70–99)
GLUCOSE URINE: NEGATIVE
HCT VFR BLD CALC: 39.6 % (ref 41–53)
HEMOGLOBIN: 13.3 G/DL (ref 13.5–17.5)
IMMATURE GRANULOCYTES: ABNORMAL %
KETONES, URINE: NEGATIVE
LEUKOCYTE ESTERASE, URINE: ABNORMAL
LYMPHOCYTES # BLD: 6 % (ref 24–44)
MCH RBC QN AUTO: 32.3 PG (ref 26–34)
MCHC RBC AUTO-ENTMCNC: 33.7 G/DL (ref 31–37)
MCV RBC AUTO: 95.9 FL (ref 80–100)
MONOCYTES # BLD: 13 % (ref 1–7)
MUCUS: ABNORMAL
NITRITE, URINE: NEGATIVE
NRBC AUTOMATED: ABNORMAL PER 100 WBC
OTHER OBSERVATIONS UA: ABNORMAL
PDW BLD-RTO: 13.2 % (ref 11.5–14.9)
PH UA: 6 (ref 5–8)
PLATELET # BLD: 197 K/UL (ref 150–450)
PLATELET ESTIMATE: ABNORMAL
PMV BLD AUTO: 9.1 FL (ref 6–12)
POTASSIUM SERPL-SCNC: 3.9 MMOL/L (ref 3.7–5.3)
PROTEIN UA: ABNORMAL
RBC # BLD: 4.13 M/UL (ref 4.5–5.9)
RBC # BLD: ABNORMAL 10*6/UL
RBC UA: ABNORMAL /HPF
RENAL EPITHELIAL, UA: ABNORMAL /HPF
SEG NEUTROPHILS: 81 % (ref 36–66)
SEGMENTED NEUTROPHILS ABSOLUTE COUNT: 9.4 K/UL (ref 1.3–9.1)
SODIUM BLD-SCNC: 135 MMOL/L (ref 135–144)
SPECIFIC GRAVITY UA: 1.01 (ref 1–1.03)
TRICHOMONAS: ABNORMAL
TURBIDITY: ABNORMAL
URINE HGB: ABNORMAL
UROBILINOGEN, URINE: NORMAL
WBC # BLD: 11.6 K/UL (ref 3.5–11)
WBC # BLD: ABNORMAL 10*3/UL
WBC UA: ABNORMAL /HPF
YEAST: ABNORMAL

## 2018-10-27 PROCEDURE — 6360000002 HC RX W HCPCS: Performed by: FAMILY MEDICINE

## 2018-10-27 PROCEDURE — 87086 URINE CULTURE/COLONY COUNT: CPT

## 2018-10-27 PROCEDURE — 96366 THER/PROPH/DIAG IV INF ADDON: CPT

## 2018-10-27 PROCEDURE — 6370000000 HC RX 637 (ALT 250 FOR IP): Performed by: FAMILY MEDICINE

## 2018-10-27 PROCEDURE — 81001 URINALYSIS AUTO W/SCOPE: CPT

## 2018-10-27 PROCEDURE — 85025 COMPLETE CBC W/AUTO DIFF WBC: CPT

## 2018-10-27 PROCEDURE — 94761 N-INVAS EAR/PLS OXIMETRY MLT: CPT

## 2018-10-27 PROCEDURE — 2580000003 HC RX 258: Performed by: FAMILY MEDICINE

## 2018-10-27 PROCEDURE — 94640 AIRWAY INHALATION TREATMENT: CPT

## 2018-10-27 PROCEDURE — 96376 TX/PRO/DX INJ SAME DRUG ADON: CPT

## 2018-10-27 PROCEDURE — 36415 COLL VENOUS BLD VENIPUNCTURE: CPT

## 2018-10-27 PROCEDURE — 2060000000 HC ICU INTERMEDIATE R&B

## 2018-10-27 PROCEDURE — 99232 SBSQ HOSP IP/OBS MODERATE 35: CPT | Performed by: INTERNAL MEDICINE

## 2018-10-27 PROCEDURE — 80048 BASIC METABOLIC PNL TOTAL CA: CPT

## 2018-10-27 RX ORDER — PREDNISONE 20 MG/1
20 TABLET ORAL 2 TIMES DAILY
Status: DISCONTINUED | OUTPATIENT
Start: 2018-10-27 | End: 2018-10-28 | Stop reason: HOSPADM

## 2018-10-27 RX ORDER — LORAZEPAM 2 MG/ML
1 INJECTION INTRAMUSCULAR NIGHTLY PRN
Status: DISCONTINUED | OUTPATIENT
Start: 2018-10-27 | End: 2018-10-28 | Stop reason: HOSPADM

## 2018-10-27 RX ORDER — METHYLPREDNISOLONE SODIUM SUCCINATE 40 MG/ML
30 INJECTION, POWDER, LYOPHILIZED, FOR SOLUTION INTRAMUSCULAR; INTRAVENOUS EVERY 12 HOURS
Status: DISCONTINUED | OUTPATIENT
Start: 2018-10-27 | End: 2018-10-27

## 2018-10-27 RX ADMIN — METHYLPREDNISOLONE SODIUM SUCCINATE 30 MG: 40 INJECTION, POWDER, FOR SOLUTION INTRAMUSCULAR; INTRAVENOUS at 03:06

## 2018-10-27 RX ADMIN — Medication 10 ML: at 10:37

## 2018-10-27 RX ADMIN — IPRATROPIUM BROMIDE AND ALBUTEROL SULFATE 1 AMPULE: .5; 3 SOLUTION RESPIRATORY (INHALATION) at 07:59

## 2018-10-27 RX ADMIN — PREDNISONE 20 MG: 20 TABLET ORAL at 20:21

## 2018-10-27 RX ADMIN — FUROSEMIDE 20 MG: 10 INJECTION, SOLUTION INTRAMUSCULAR; INTRAVENOUS at 17:56

## 2018-10-27 RX ADMIN — MOMETASONE FUROATE AND FORMOTEROL FUMARATE DIHYDRATE 2 PUFF: 200; 5 AEROSOL RESPIRATORY (INHALATION) at 10:37

## 2018-10-27 RX ADMIN — IPRATROPIUM BROMIDE AND ALBUTEROL SULFATE 1 AMPULE: .5; 3 SOLUTION RESPIRATORY (INHALATION) at 19:38

## 2018-10-27 RX ADMIN — CEFTRIAXONE SODIUM 1 G: 1 INJECTION, POWDER, FOR SOLUTION INTRAMUSCULAR; INTRAVENOUS at 01:38

## 2018-10-27 RX ADMIN — FUROSEMIDE 20 MG: 10 INJECTION, SOLUTION INTRAMUSCULAR; INTRAVENOUS at 10:38

## 2018-10-27 RX ADMIN — METOPROLOL TARTRATE 50 MG: 50 TABLET ORAL at 10:37

## 2018-10-27 RX ADMIN — Medication 10 ML: at 17:56

## 2018-10-27 RX ADMIN — FINASTERIDE 5 MG: 5 TABLET, FILM COATED ORAL at 10:38

## 2018-10-27 RX ADMIN — POTASSIUM CHLORIDE 20 MEQ: 20 TABLET, EXTENDED RELEASE ORAL at 10:37

## 2018-10-27 RX ADMIN — AZITHROMYCIN MONOHYDRATE 500 MG: 500 INJECTION, POWDER, LYOPHILIZED, FOR SOLUTION INTRAVENOUS at 02:08

## 2018-10-27 RX ADMIN — MOMETASONE FUROATE AND FORMOTEROL FUMARATE DIHYDRATE 2 PUFF: 200; 5 AEROSOL RESPIRATORY (INHALATION) at 20:21

## 2018-10-27 RX ADMIN — TAMSULOSIN HYDROCHLORIDE 0.8 MG: 0.4 CAPSULE ORAL at 20:21

## 2018-10-27 RX ADMIN — IPRATROPIUM BROMIDE AND ALBUTEROL SULFATE 1 AMPULE: .5; 3 SOLUTION RESPIRATORY (INHALATION) at 11:28

## 2018-10-27 RX ADMIN — Medication 10 ML: at 20:21

## 2018-10-27 RX ADMIN — ATORVASTATIN CALCIUM 40 MG: 40 TABLET, FILM COATED ORAL at 20:21

## 2018-10-27 RX ADMIN — ACETAMINOPHEN 650 MG: 325 TABLET, FILM COATED ORAL at 03:02

## 2018-10-27 ASSESSMENT — PAIN SCALES - GENERAL
PAINLEVEL_OUTOF10: 2
PAINLEVEL_OUTOF10: 0
PAINLEVEL_OUTOF10: 0

## 2018-10-27 NOTE — PROGRESS NOTES
[id number]      PROGRESS NOTE        Patient:  Severino Lovell  YOB: 1940    MRN: [id number]     Acct: [de-identified]     Admit date: 10/25/2018    Pt seen and Chart reviewed. Consultant notes reviewed and care evaluated. Subjective: PPATIENT IS DOING OKAY THIS MORNING HE HAS NO COMPLAINS HE IS NOT SURE WHAT HAPPENED EXACTLY LAST NIGHT HE THINKS HE SLEPT. bUT THEY HAVE THE MONITOR ON HIM CAMERA OVERNIGHT HIS WIFE WASN'T SURE IF HE GET LIPIDS CONFUSED.  wHICH SHE [address]. bUT THIS MORNING HE SEEMS OKAY HE HAD HIS BREAKFAST HE DENIES ANY PAIN OR ANY INCREASED SHORTNESS OF BREATH OR CHEST PAIN AT THIS TIME. tHE ABOVE CONSULTANT NOTES WERE NOTED AND DISCUSSED WITH dR. LEMUS YESTERDAY HIS EFFUSION IS SMALL AND DEEP AND ELECTED TO JUST MONITOR IT HE IS DIURESING. AND HE IS NOT WHEEZING OR STRIDOR DURING ANYMORE AFTER THE SOLU-MEDROL. aLSO DISCUSSED WITH HEMATOLOGY PLAN FOR POSSIBLE IMMUNOTHERAPY AND UROLOGY STILL MIGHT PLAN FOR NEPHRECTOMY IF HE CONTINUES TO HAVE SOME HEMATURIA HIS WIFE SHOWED ME HIS URINE FROM THIS MORNING IS DOESN'T LOOK LIKE DARK BLOODY URINE.     Diet:  DIET CARDIAC;      Medications:Current Inpatient    Scheduled Meds:   atorvastatin  40 mg Oral Nightly    finasteride  5 mg Oral Daily    tamsulosin  0.8 mg Oral Nightly    potassium chloride  20 mEq Oral Daily with breakfast    metoprolol tartrate  50 mg Oral Daily    mometasone-formoterol  2 puff Inhalation Q12H    furosemide  20 mg Intravenous BID    ipratropium-albuterol  1 ampule Inhalation Q4H WA    methylPREDNISolone  30 mg Intravenous Q8H    cefTRIAXone (ROCEPHIN) IV  1 g Intravenous Q24H    azithromycin  500 mg Intravenous Q24H     Continuous Infusions:  PRN Meds:LORazepam, albuterol sulfate HFA, acetaminophen, sodium chloride flush        Physical Exam:  Vitals: /67   Pulse 112   Temp 98 °F (36.7 °C) (Oral) he continues to have hematuria and he might still have to do a nephrectomy. wE'LL TRY TO PUT aTIVAN 1 MG AT AT BEDTIME WHEN NECESSARY IF HE GETS MORE CONFUSED.    dISCUSSED WITH HIM AND HIS WIFE.     DO JUAN Ram        10/27/2018, 9:09 AM

## 2018-10-27 NOTE — PLAN OF CARE
Problem: Falls - Risk of:  Goal: Will remain free from falls  Will remain free from falls   Outcome: Ongoing  No falls this shift. Up to bathroom with assist. Pt has confusion at times, telesitter remains in room, pt does not call for help. Katharine bella rounds continued this shift.

## 2018-10-27 NOTE — PROGRESS NOTES
kg/m²   General appearance - well appearing, no in pain or distress  Mental status - slightly confused. Follows command and answers questions. Eyes - pupils equal and reactive, extraocular eye movements intact  Mouth - mucous membranes moist, pharynx normal without lesions  Neck - supple, no significant adenopathy  Lymphatics - no palpable lymphadenopathy, no hepatosplenomegaly  Chest - clear to auscultation, no wheezes, rales or rhonchi, symmetric air entry  Heart - normal rate, regular rhythm, normal S1, S2, no murmurs  Abdomen - soft, nontender, nondistended, no masses or organomegaly  Neurological - alert, oriented, normal speech, no focal findings or movement disorder noted  Extremities - peripheral pulses normal, no pedal edema, no clubbing or cyanosis  Skin - normal coloration and turgor, no rashes, no suspicious skin lesions noted         Data:    No intake/output data recorded. In: -   Out: 600 [Urine:600]    CBC:   Recent Labs      10/25/18   2000  10/26/18   0534  10/27/18   0554   WBC  9.6  7.5  11.6*   HGB  13.2*  12.3*  13.3*   PLT  175  157  197     BMP:    Recent Labs      10/25/18   2000  10/26/18   0534  10/27/18   0554   NA  140  137  135   K  4.1  3.8  3.9   CL  99  96*  92*   CO2  31  29  28   BUN  20  19  24*   CREATININE  0.83  0.88  0.98   GLUCOSE  102*  189*  136*     Hepatic:   Recent Labs      10/25/18   2000   AST  19   ALT  13   BILITOT  0.86   ALKPHOS  89     INR:   Recent Labs      10/26/18   0818   INR  1.1     PTT:No results for input(s): PTT in the last 72 hours.           Problem Lists:   Primary Problem:  <principal problem not specified>   Current Problems:  Active Hospital Problems    Diagnosis Date Noted    Pleural effusion [J90] 10/25/2018     PMH:  Past Medical History:   Diagnosis Date    Ambulates with cane     unstable, total knee surgery didn't help stability    Arrhythmia     Arthritis     o.a.    At risk for falling     unstable walking, uses cane    Atrial

## 2018-10-27 NOTE — PROGRESS NOTES
Pulmonary Progress Note  [facility]      Patient - Diya Street,  Age - [de-identified] y.o.    - 1940      Room Number - 2101/2101-01   N -  [id number]   [facility]  Date of Admission -  10/25/2018  7:17 PM        Consulting Service/Physician   Consulting - Tazne Hawk, DO  Primary Care Physician - Delene Hawk, DO     SUBJECTIVE   Sitting up in bed, sleeping, discussed with family, patient does respond to verbal stimuli, no complaints of pain  Oncology note reviewed-plan systemic/immunotherapy as outpatient  Plan discharge soon if hematuria decreases    OBJECTIVE   VITALS    height is 6' 2.02\" (1.88 m) and weight is 263 lb 14.3 oz (119.7 kg). His oral temperature is 97.6 °F (36.4 °C). His blood pressure is 146/73 (abnormal) and his pulse is 65. His respiration is 16 and oxygen saturation is 96%. Body mass index is 33.87 kg/m². Temperature Range: Temp: 97.6 °F (36.4 °C) Temp  Av.8 °F (36.6 °C)  Min: 97.3 °F (36.3 °C)  Max: 98.2 °F (36.8 °C)  BP Range:  Systolic (08BPH), NJS:218 , Min:107 , KNJ:718     Diastolic (35JJF), MJP:22, Min:53, Max:77    Pulse Range: Pulse  Av.8  Min: 65  Max: 112  Respiration Range: Resp  Av.9  Min: 16  Max: 18  Current Pulse Ox[de-identified]  SpO2: 96 %  24HR Pulse Ox Range:  SpO2  Av.1 %  Min: 90 %  Max: 97 %  Oxygen Amount and Delivery: Wt Readings from Last 3 Encounters:   10/25/18 263 lb 14.3 oz (119.7 kg)   10/17/18 264 lb (119.7 kg)   18 257 lb (116.6 kg)       I/O (24 Hours)    Intake/Output Summary (Last 24 hours) at 10/27/18 1609  Last data filed at 10/27/18 0440   Gross per 24 hour   Intake                0 ml   Output              550 ml   Net             -550 ml       EXAM     General Appearance  Awake, alert, oriented, in no acute distress  HEENT - normocephalic, atraumatic.  []  Mallampati  [] Crowded airway   [] Macroglossia  []  Retrognathia  [] Micrognathia  []  Normal tongue size []  Normal Bite  [] Oumar sign positive    Neck - Supple,  trachea midline   Lungs - Symmetric expansion slightly decreased breath sounds but no wheezes no rhonchi  Cardiovascular - Heart sounds are normal.  Regular rate and rhythm   Abdomen - Soft, nontender, nondistended, no masses or organomegaly  Neurologic - There are no focal motor or sensory deficits  Skin - No bruising or bleeding  Extremities - No clubbing, cyanosis, edema    MEDS      methylPREDNISolone  30 mg Intravenous Q12H    atorvastatin  40 mg Oral Nightly    finasteride  5 mg Oral Daily    tamsulosin  0.8 mg Oral Nightly    potassium chloride  20 mEq Oral Daily with breakfast    metoprolol tartrate  50 mg Oral Daily    mometasone-formoterol  2 puff Inhalation Q12H    furosemide  20 mg Intravenous BID    ipratropium-albuterol  1 ampule Inhalation Q4H WA    cefTRIAXone (ROCEPHIN) IV  1 g Intravenous Q24H    azithromycin  500 mg Intravenous Q24H       LORazepam, albuterol sulfate HFA, acetaminophen, sodium chloride flush    LABS   CBC   Recent Labs      10/27/18   0554   WBC  11.6*   HGB  13.3*   HCT  39.6*   MCV  95.9   PLT  197     BMP: Lab Results   Component Value Date     10/27/2018    K 3.9 10/27/2018    CL 92 10/27/2018    CO2 28 10/27/2018    BUN 24 10/27/2018    LABALBU 4.0 10/25/2018    LABALBU 4.5 10/13/2011    CREATININE 0.98 10/27/2018    CALCIUM 9.5 10/27/2018    GFRAA >60 10/27/2018    LABGLOM >60 10/27/2018     ABGs:No results found for: PHART, PO2ART, MQY5LBK No results found for: IFIO2, MODE, SETTIDVOL, SETPEEP  Ionized Calcium:  No results found for: IONCA  Magnesium:    Lab Results   Component Value Date    MG 2.5 05/13/2014     Phosphorus:  No results found for: PHOS     LIVER PROFILE   Recent Labs      10/25/18   2000   AST  19   ALT  13   LIPASE  14   BILIDIR  0.26   BILITOT  0.86   ALKPHOS  89     INR   Recent Labs      10/26/18   0818   INR  1.1     PTT   Lab Results   Component Value Date    APTT 31.4 (H) 10/26/2018         RADIOLOGY     (See actual reports for details)    ASSESSMENT/PLAN   Active Problems:    Pleural effusion    Urothelial cancer ([address]  Resolved Problems:    * No resolved hospital problems.  *    Plan:  Discussed with wife and daughters  Not enough fluid to warrant thoracentesis  Changed to oral steroids  Okayed discharge from pulmonary standpoint  Follow-up medical oncology and urology  Electronically signed by Esme Price MD on 10/27/2018 at 4:09 PM

## 2018-10-27 NOTE — CONSULTS
alcohol,  illicit, or drug use. FAMILY HISTORY:  Noncontributory. There is no underlying lung cancer  or renal cell cancer of the family. REVIEW OF SYSTEMS:  As in history of present illness. Otherwise, all  systems reviewed and otherwise negative. PHYSICAL EXAMINATION:  GENERAL APPEARANCE:  Elderly gentleman in no acute respiratory  distress. VITAL SIGNS:  His temperature is 97.7, respiratory rate 16-20, pulse  is 79, blood pressure 140/82, oxygen saturation is 94% at rest on room  air. HEENT:  Oral cavity is clear. LUNGS:  Slightly diminished at the bases. A rare wheeze. No rhonchi. No tenderness to palpation. No dullness to percussion. CARDIAC:  S1, S2.  ABDOMEN:  Soft, nontender, nondistended. EXTREMITIES:  Show no peripheral edema. NEUROLOGICAL:  There are no focal deficits. LABORATORY DATA:  Sodium 137, potassium 3.8, chloride 96, bicarb 29,  BUN of 19, creatinine 0.88. INR is 1.1. White count is 7.5 with an H  and H of 12.3 and 36.8, platelet count of 359. ProBNP is 1754. CAT scan of the chest, again which I reviewed, showed a multiple  pulmonary nodules both in the left and right side. There is a small  left pleural effusion as well. 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 At this point, because he has metastatic  disease, I do not think surgical options would be needed. We will go  ahead and try to do a diagnostic thoracentesis today and see how much  fluid is there. I do not think he needs any other lung biopsies, etc.  His COPD appears to be fairly stable. I do not appreciate any  wheezes. I would continue to use his nebulizers and his Dulera. Thank you for this consult. Case was discussed with Dr. Abrahan Dacosta.         Abdoul Peralta    D: 10/26/2018 12:19:31       T: 10/26/2018 14:26:11     [email or web address]  Job#: [id number]     Doc#: [id number]    CC:

## 2018-10-28 VITALS
HEART RATE: 77 BPM | SYSTOLIC BLOOD PRESSURE: 142 MMHG | HEIGHT: 74 IN | TEMPERATURE: 98.3 F | WEIGHT: 263.89 LBS | DIASTOLIC BLOOD PRESSURE: 67 MMHG | BODY MASS INDEX: 33.87 KG/M2 | OXYGEN SATURATION: 96 % | RESPIRATION RATE: 16 BRPM

## 2018-10-28 LAB
ABSOLUTE EOS #: 0 K/UL (ref 0–0.4)
ABSOLUTE IMMATURE GRANULOCYTE: ABNORMAL K/UL (ref 0–0.3)
ABSOLUTE LYMPH #: 0.6 K/UL (ref 1–4.8)
ABSOLUTE MONO #: 1.6 K/UL (ref 0.1–1.3)
ANION GAP SERPL CALCULATED.3IONS-SCNC: 12 MMOL/L (ref 9–17)
BASOPHILS # BLD: 0 % (ref 0–2)
BASOPHILS ABSOLUTE: 0 K/UL (ref 0–0.2)
BUN BLDV-MCNC: 26 MG/DL (ref 8–23)
BUN/CREAT BLD: ABNORMAL (ref 9–20)
CALCIUM SERPL-MCNC: 9 MG/DL (ref 8.6–10.4)
CHLORIDE BLD-SCNC: 97 MMOL/L (ref 98–107)
CO2: 29 MMOL/L (ref 20–31)
CREAT SERPL-MCNC: 0.87 MG/DL (ref 0.7–1.2)
CULTURE: NO GROWTH
DIFFERENTIAL TYPE: ABNORMAL
EOSINOPHILS RELATIVE PERCENT: 0 % (ref 0–4)
GFR AFRICAN AMERICAN: >60 ML/MIN
GFR NON-AFRICAN AMERICAN: >60 ML/MIN
GFR SERPL CREATININE-BSD FRML MDRD: ABNORMAL ML/MIN/{1.73_M2}
GFR SERPL CREATININE-BSD FRML MDRD: ABNORMAL ML/MIN/{1.73_M2}
GLUCOSE BLD-MCNC: 142 MG/DL (ref 70–99)
HCT VFR BLD CALC: 36.4 % (ref 41–53)
HEMOGLOBIN: 12.2 G/DL (ref 13.5–17.5)
IMMATURE GRANULOCYTES: ABNORMAL %
LYMPHOCYTES # BLD: 6 % (ref 24–44)
Lab: NORMAL
MCH RBC QN AUTO: 32.4 PG (ref 26–34)
MCHC RBC AUTO-ENTMCNC: 33.7 G/DL (ref 31–37)
MCV RBC AUTO: 96.1 FL (ref 80–100)
MONOCYTES # BLD: 15 % (ref 1–7)
NRBC AUTOMATED: ABNORMAL PER 100 WBC
PDW BLD-RTO: 13.4 % (ref 11.5–14.9)
PLATELET # BLD: 181 K/UL (ref 150–450)
PLATELET ESTIMATE: ABNORMAL
PMV BLD AUTO: 9.2 FL (ref 6–12)
POTASSIUM SERPL-SCNC: 3.9 MMOL/L (ref 3.7–5.3)
RBC # BLD: 3.78 M/UL (ref 4.5–5.9)
RBC # BLD: ABNORMAL 10*6/UL
SEG NEUTROPHILS: 79 % (ref 36–66)
SEGMENTED NEUTROPHILS ABSOLUTE COUNT: 8.6 K/UL (ref 1.3–9.1)
SODIUM BLD-SCNC: 138 MMOL/L (ref 135–144)
SPECIMEN DESCRIPTION: NORMAL
STATUS: NORMAL
WBC # BLD: 10.8 K/UL (ref 3.5–11)
WBC # BLD: ABNORMAL 10*3/UL

## 2018-10-28 PROCEDURE — 94640 AIRWAY INHALATION TREATMENT: CPT

## 2018-10-28 PROCEDURE — 80048 BASIC METABOLIC PNL TOTAL CA: CPT

## 2018-10-28 PROCEDURE — 6370000000 HC RX 637 (ALT 250 FOR IP): Performed by: FAMILY MEDICINE

## 2018-10-28 PROCEDURE — 96366 THER/PROPH/DIAG IV INF ADDON: CPT

## 2018-10-28 PROCEDURE — 2580000003 HC RX 258: Performed by: FAMILY MEDICINE

## 2018-10-28 PROCEDURE — 94761 N-INVAS EAR/PLS OXIMETRY MLT: CPT

## 2018-10-28 PROCEDURE — 96376 TX/PRO/DX INJ SAME DRUG ADON: CPT

## 2018-10-28 PROCEDURE — 36415 COLL VENOUS BLD VENIPUNCTURE: CPT

## 2018-10-28 PROCEDURE — 6360000002 HC RX W HCPCS: Performed by: FAMILY MEDICINE

## 2018-10-28 PROCEDURE — 85025 COMPLETE CBC W/AUTO DIFF WBC: CPT

## 2018-10-28 RX ORDER — PREDNISONE 20 MG/1
10 TABLET ORAL 2 TIMES DAILY
Qty: 8 TABLET | Refills: 0 | Status: SHIPPED | OUTPATIENT
Start: 2018-10-28 | End: 2018-11-05

## 2018-10-28 RX ORDER — AZITHROMYCIN 250 MG/1
250 TABLET, FILM COATED ORAL DAILY
Qty: 3 TABLET | Refills: 0 | Status: SHIPPED | OUTPATIENT
Start: 2018-10-28 | End: 2018-10-31

## 2018-10-28 RX ORDER — CEPHALEXIN 500 MG/1
500 CAPSULE ORAL 3 TIMES DAILY
Qty: 21 CAPSULE | Refills: 0 | Status: SHIPPED | OUTPATIENT
Start: 2018-10-28 | End: 2018-11-04

## 2018-10-28 RX ORDER — METOPROLOL TARTRATE 50 MG/1
50 TABLET, FILM COATED ORAL DAILY
Qty: 60 TABLET | Refills: 3 | Status: SHIPPED | OUTPATIENT
Start: 2018-10-29

## 2018-10-28 RX ADMIN — PREDNISONE 20 MG: 20 TABLET ORAL at 08:17

## 2018-10-28 RX ADMIN — FINASTERIDE 5 MG: 5 TABLET, FILM COATED ORAL at 08:17

## 2018-10-28 RX ADMIN — Medication 10 ML: at 08:17

## 2018-10-28 RX ADMIN — FUROSEMIDE 20 MG: 10 INJECTION, SOLUTION INTRAMUSCULAR; INTRAVENOUS at 08:17

## 2018-10-28 RX ADMIN — CEFTRIAXONE SODIUM 1 G: 1 INJECTION, POWDER, FOR SOLUTION INTRAMUSCULAR; INTRAVENOUS at 00:06

## 2018-10-28 RX ADMIN — METOPROLOL TARTRATE 50 MG: 50 TABLET ORAL at 08:17

## 2018-10-28 RX ADMIN — POTASSIUM CHLORIDE 20 MEQ: 20 TABLET, EXTENDED RELEASE ORAL at 08:17

## 2018-10-28 RX ADMIN — MOMETASONE FUROATE AND FORMOTEROL FUMARATE DIHYDRATE 2 PUFF: 200; 5 AEROSOL RESPIRATORY (INHALATION) at 08:17

## 2018-10-28 RX ADMIN — IPRATROPIUM BROMIDE AND ALBUTEROL SULFATE 1 AMPULE: .5; 3 SOLUTION RESPIRATORY (INHALATION) at 07:46

## 2018-10-28 RX ADMIN — AZITHROMYCIN MONOHYDRATE 500 MG: 500 INJECTION, POWDER, LYOPHILIZED, FOR SOLUTION INTRAVENOUS at 01:04

## 2018-10-28 ASSESSMENT — PAIN SCALES - GENERAL: PAINLEVEL_OUTOF10: 0

## 2018-10-28 NOTE — CARE COORDINATION
DISCHARGE PLANNING NOTE:    Plan is for this patient to return to home and will have services through [facility]. On IV zithromax and IV rocephin. Will continue to follow along for further discharge needs.      Electronically signed by Charisma Guzman RN on 10/28/2018 at 10:54 AM

## 2018-10-28 NOTE — CARE COORDINATION
Patient discharged home. Discharge and follow up instructions received, patient denies questions, voices understanding.

## 2018-11-02 ENCOUNTER — TELEPHONE (OUTPATIENT)
Dept: ONCOLOGY | Age: 78
End: 2018-11-02

## 2018-11-02 ENCOUNTER — OFFICE VISIT (OUTPATIENT)
Dept: ONCOLOGY | Age: 78
End: 2018-11-02
Payer: MEDICARE

## 2018-11-02 VITALS
DIASTOLIC BLOOD PRESSURE: 63 MMHG | BODY MASS INDEX: 33.75 KG/M2 | WEIGHT: 263 LBS | HEART RATE: 65 BPM | TEMPERATURE: 98.8 F | HEIGHT: 74 IN | SYSTOLIC BLOOD PRESSURE: 121 MMHG

## 2018-11-02 DIAGNOSIS — C68.9 UROTHELIAL CANCER (HCC): ICD-10-CM

## 2018-11-02 PROCEDURE — 99211 OFF/OP EST MAY X REQ PHY/QHP: CPT | Performed by: INTERNAL MEDICINE

## 2018-11-02 PROCEDURE — 99215 OFFICE O/P EST HI 40 MIN: CPT | Performed by: INTERNAL MEDICINE

## 2018-11-02 NOTE — TELEPHONE ENCOUNTER
Per Dr Sola Lr AVS, pt to start immunotherapy-teach to be done on day 1 and RV on day 1 as well-Honey, [facility] printed out info on atezolizumab for pt.   AVS given to Prabha Mckeon ( for chemo)

## 2018-11-04 NOTE — PROGRESS NOTES
Patient ID: Arturo Reese, 1940, [id number], [de-identified] y.o. Referred by : Hospital follow up  Reason for consultation:   metastatic urothelial cancer  HISTORY OF PRESENT ILLNESS:    Oncologic History: This is a [de-identified] YO male with recent diagnosis of high grade urothelial cancer was admitted with SOB.     He was recently admitted with hematuria. He was seen by urology. Pt had cysto, right retrograde pyelogram, right URS with biopsy and stent on 9/14/18. The biopsy showed high grade urothelial carcinoma. He was planning to have right robotic nephroureterectomy with Dr Garrison Bhakta on 10/29 and had seen his cardiologist for preoperative risk assessment and had stress test. After the test he was noted to have lot of wheezing and SOB, so as per cardiology pulmonary evaluation was recommended. He is scheduled for surgery Monday.     He was noted to have SOB and and had CT chest yesterday which showed Multiple lung nodules concerning for metastatic disease and Left pleural effusion with associated atelectasis. Oncology called for further evaluation. Quit smoking 1 yr ago after 40 yrs   Interval History:  Pt returning for follow up visit. His surgery has been cancelled. He wants to receive treatment. He is accompanied by his family members. ++fatigue. He denied any pain. No hematuria. During this visit patient's allergy, social, medical, surgical history and medications were reviewed and updated.       Past Medical History:   Diagnosis Date    Ambulates with cane     unstable, total knee surgery didn't help stability    Arrhythmia     Arthritis     o.a.    At risk for falling     unstable walking, uses cane    Atrial fibrillation ([address] 11/25/2014    on EKG    Atrial flutter ([address] 05/13/2014    on EKG    BPH (benign prostatic hyperplasia)     Cholelithiases     seen on CT    COPD (chronic obstructive pulmonary disease) (HCC)     Inhalers    Full dentures     Upper only    Hard of hearing     no hearing aids Base) MCG/ACT inhaler Inhale 2 puffs into the lungs 4 times daily as needed for Wheezing      mometasone-formoterol (DULERA) 200-5 MCG/ACT inhaler Inhale 2 puffs into the lungs every 12 hours      finasteride (PROSCAR) 5 MG tablet Take 1 tablet by mouth daily 30 tablet 11    atorvastatin (LIPITOR) 40 MG tablet Take 40 mg by mouth nightly.  tamsulosin (FLOMAX) 0.4 MG capsule Take 0.8 mg by mouth nightly Takes 2 tablets to equal 0.8 mg       No current facility-administered medications for this visit. Social History     Social History    Marital status:      Spouse name: Lillian Desouza Number of children: 3    Years of education: N/A     Occupational History    retired [employer]     Social History Main Topics    Smoking status: Former Smoker     Packs/day: 0.50     Years: 60.00     Start date: 1948     Quit date: 4/17/2017    Smokeless tobacco: Never Used    Alcohol use No      Comment: quit in 2000    Drug use: No    Sexual activity: Yes     Partners: Female     Other Topics Concern    Not on file     Social History Narrative    No narrative on file       Family History   Problem Relation Age of Onset    Colon Cancer Brother     Diabetes Mother     Other Father         black lung disease    No Known Problems Maternal Grandmother     No Known Problems Maternal Grandfather     No Known Problems Paternal Grandmother     No Known Problems Paternal Grandfather     Breast Cancer Sister     No Known Problems Sister         REVIEW OF SYSTEM:   Constitutional: No fever or chills.  No night sweats, no weight loss   Eyes: No eye discharge, double vision, or eye pain   HEENT: negative for sore mouth, sore throat, hoarseness and voice change   Respiratory: negative for cough , sputum, dyspnea, wheezing, hemoptysis, chest pain   Cardiovascular: negative for chest pain, dyspnea, palpitations, orthopnea, PND   Gastrointestinal: negative for nausea, vomiting, diarrhea, constipation,

## 2018-11-05 ENCOUNTER — HOSPITAL ENCOUNTER (OUTPATIENT)
Age: 78
Setting detail: SPECIMEN
Discharge: HOME | End: 2018-11-05
Payer: MEDICARE

## 2018-11-05 LAB
ABSOLUTE BANDS #: 0.22 K/UL (ref 0–1)
ABSOLUTE EOS #: 0.11 K/UL (ref 0–0.4)
ABSOLUTE IMMATURE GRANULOCYTE: ABNORMAL K/UL (ref 0–0.3)
ABSOLUTE LYMPH #: 2.05 K/UL (ref 1–4.8)
ABSOLUTE MONO #: 1.4 K/UL (ref 0.1–1.3)
ANION GAP SERPL CALCULATED.3IONS-SCNC: 10 MMOL/L (ref 9–17)
BANDS: 2 % (ref 0–10)
BASOPHILS # BLD: 0 % (ref 0–2)
BASOPHILS ABSOLUTE: 0 K/UL (ref 0–0.2)
BNP INTERPRETATION: ABNORMAL
BUN BLDV-MCNC: 17 MG/DL (ref 8–23)
BUN/CREAT BLD: ABNORMAL (ref 9–20)
CALCIUM SERPL-MCNC: 9 MG/DL (ref 8.6–10.4)
CHLORIDE BLD-SCNC: 96 MMOL/L (ref 98–107)
CO2: 31 MMOL/L (ref 20–31)
CREAT SERPL-MCNC: 0.84 MG/DL (ref 0.7–1.2)
DIFFERENTIAL TYPE: ABNORMAL
EOSINOPHILS RELATIVE PERCENT: 1 % (ref 0–4)
GFR AFRICAN AMERICAN: >60 ML/MIN
GFR NON-AFRICAN AMERICAN: >60 ML/MIN
GFR SERPL CREATININE-BSD FRML MDRD: ABNORMAL ML/MIN/{1.73_M2}
GFR SERPL CREATININE-BSD FRML MDRD: ABNORMAL ML/MIN/{1.73_M2}
GLUCOSE BLD-MCNC: 97 MG/DL (ref 70–99)
HCT VFR BLD CALC: 40.4 % (ref 41–53)
HEMOGLOBIN: 13.5 G/DL (ref 13.5–17.5)
IMMATURE GRANULOCYTES: ABNORMAL %
LYMPHOCYTES # BLD: 19 % (ref 24–44)
MCH RBC QN AUTO: 32.2 PG (ref 26–34)
MCHC RBC AUTO-ENTMCNC: 33.4 G/DL (ref 31–37)
MCV RBC AUTO: 96.5 FL (ref 80–100)
MONOCYTES # BLD: 13 % (ref 1–7)
MORPHOLOGY: ABNORMAL
NRBC AUTOMATED: ABNORMAL PER 100 WBC
PDW BLD-RTO: 13.5 % (ref 11.5–14.9)
PLATELET # BLD: 153 K/UL (ref 150–450)
PLATELET ESTIMATE: ABNORMAL
PMV BLD AUTO: 9.7 FL (ref 6–12)
POTASSIUM SERPL-SCNC: 4 MMOL/L (ref 3.7–5.3)
PRO-BNP: 1364 PG/ML
RBC # BLD: 4.18 M/UL (ref 4.5–5.9)
RBC # BLD: ABNORMAL 10*6/UL
SEG NEUTROPHILS: 65 % (ref 36–66)
SEGMENTED NEUTROPHILS ABSOLUTE COUNT: 7.02 K/UL (ref 1.3–9.1)
SODIUM BLD-SCNC: 137 MMOL/L (ref 135–144)
WBC # BLD: 10.8 K/UL (ref 3.5–11)
WBC # BLD: ABNORMAL 10*3/UL

## 2018-11-05 PROCEDURE — 80048 BASIC METABOLIC PNL TOTAL CA: CPT

## 2018-11-05 PROCEDURE — 85025 COMPLETE CBC W/AUTO DIFF WBC: CPT

## 2018-11-05 PROCEDURE — 83880 ASSAY OF NATRIURETIC PEPTIDE: CPT

## 2018-11-06 ENCOUNTER — OFFICE VISIT (OUTPATIENT)
Dept: UROLOGY | Age: 78
End: 2018-11-06
Payer: MEDICARE

## 2018-11-06 VITALS
BODY MASS INDEX: 33.75 KG/M2 | WEIGHT: 263.01 LBS | DIASTOLIC BLOOD PRESSURE: 64 MMHG | TEMPERATURE: 97.6 F | HEART RATE: 65 BPM | HEIGHT: 74 IN | SYSTOLIC BLOOD PRESSURE: 128 MMHG

## 2018-11-06 DIAGNOSIS — C65.1 TRANSITIONAL CELL CARCINOMA OF RENAL PELVIS, RIGHT (HCC): ICD-10-CM

## 2018-11-06 DIAGNOSIS — R31.0 GROSS HEMATURIA: Primary | ICD-10-CM

## 2018-11-06 DIAGNOSIS — C78.00 MALIGNANT NEOPLASM METASTATIC TO LUNG, UNSPECIFIED LATERALITY (HCC): ICD-10-CM

## 2018-11-06 PROCEDURE — 99214 OFFICE O/P EST MOD 30 MIN: CPT | Performed by: UROLOGY

## 2018-11-06 ASSESSMENT — ENCOUNTER SYMPTOMS
COUGH: 1
NAUSEA: 0
COLOR CHANGE: 0
VOMITING: 0
SORE THROAT: 0
VOICE CHANGE: 0
ANAL BLEEDING: 0
SHORTNESS OF BREATH: 0
ABDOMINAL PAIN: 0
EYE PAIN: 0
EYE REDNESS: 0
WHEEZING: 0

## 2018-11-06 NOTE — PROGRESS NOTES
encounter. Orders Placed:  No orders of the defined types were placed in this encounter. Geraldine Alas MD    Agree with the ROS entered by the MA.

## 2018-11-09 NOTE — CONSULTS
HISTORY Left 2-10-15    Knee arthroplasty revision- poly exchange.  TOTAL KNEE ARTHROPLASTY Right 7-8-14    TOTAL KNEE ARTHROPLASTY Left Sept 18, 2014       Medications:    Scheduled Meds:  Continuous Infusions:  PRN Meds:. Allergies:  Oxycodone hcl and Other    Social History:    Social History     Social History    Marital status:      Spouse name: Desirae Silverio Number of children: 3    Years of education: N/A     Occupational History    retired [employer]     Social History Main Topics    Smoking status: Former Smoker     Packs/day: 0.50     Years: 60.00     Start date: 1948     Quit date: 4/17/2017    Smokeless tobacco: Never Used    Alcohol use No      Comment: quit in 2000    Drug use: No    Sexual activity: Yes     Partners: Female     Other Topics Concern    Not on file     Social History Narrative    No narrative on file       Family History:    Family History   Problem Relation Age of Onset    Colon Cancer Brother     Diabetes Mother     Other Father         black lung disease    No Known Problems Maternal Grandmother     No Known Problems Maternal Grandfather     No Known Problems Paternal Grandmother     No Known Problems Paternal Grandfather     Breast Cancer Sister     No Known Problems Sister        REVIEW OF SYSTEMS:  Constitutional: negative  Eyes: negative  Respiratory: negative  Cardiovascular: negative  Gastrointestinal: negative  Genitourinary: see HPI  Musculoskeletal: negative  Skin: negative   Neurological: negative  Hematological/Lymphatic: negative  Psychological: negative        Physical Exam:      This a [de-identified] y.o. female       Constitutional: Patient in no acute distress. Neuro: Alert and oriented to person, place and time. Psych: mood and affect normal  HEENT negative  Lungs: Respiratory effort is normal  Cardiovascular: Normal peripheral pulses. Regular rate  Abdomen: Soft, non-tender, non-distended with no CVA, flank pain or hepatosplenomegaly. No hernias. Kidneys normal.  Lymphatics: No palpable lymphadenopathy. Bladder non-tender and not distended. Pelvic exam: deferred  Rectal exam not indicated  Minimal/no edema in bilateral lower extremities. LABS:   CBC:         Recent Labs      10/25/18   2000  10/26/18   0534  10/27/18   0554   WBC  9.6  7.5  11.6*   HGB  13.2*  12.3*  13.3*   PLT  175  157  197      BMP:          Recent Labs      10/25/18   2000  10/26/18   0534  10/27/18   0554   NA  140  137  135   K  4.1  3.8  3.9   CL  99  96*  92*   CO2  31  29  28   BUN  20  19  24*   CREATININE  0.83  0.88  0.98   GLUCOSE  102*  189*  136*         Additional Lab/Culture results: none  \     -----------------------------------------------------------------  Imaging Reviewed during this encounter:   Skyler Orozco MD independently reviewed the images and verified the radiology reports from:    Multiple lung nodules concerning for metastatic disease       Right renal mass.  This is concerning for neoplasm.       Left pleural effusion with associated atelectasis. Assessment and Plan   Impression:    Patient Active Problem List   Diagnosis    Osteoarthritis of both knees    A-fib ([address]    HTN (hypertension)    COPD (chronic obstructive pulmonary disease) ([address]    Hyperlipemia    DJD (degenerative joint disease)    Arthrofibrosis of total knee arthroplasty ([address]    Status post total knee replacement    Arthrofibrosis of total knee arthroplasty (HCC)    Hematuria    Pleural effusion    Urothelial cancer ([address]       Plan:   Appreciate oncology input  nephrouretectomy cancelled   Discussed with patient and his wife.      Skyler Orozco MD

## 2018-11-14 ENCOUNTER — APPOINTMENT (OUTPATIENT)
Dept: GENERAL RADIOLOGY | Age: 78
DRG: 181 | End: 2018-11-14
Attending: FAMILY MEDICINE
Payer: MEDICARE

## 2018-11-14 ENCOUNTER — HOSPITAL ENCOUNTER (INPATIENT)
Age: 78
LOS: 4 days | Discharge: HOME | DRG: 181 | End: 2018-11-19
Attending: FAMILY MEDICINE | Admitting: FAMILY MEDICINE
Payer: MEDICARE

## 2018-11-14 ENCOUNTER — APPOINTMENT (OUTPATIENT)
Dept: INTERVENTIONAL RADIOLOGY/VASCULAR | Age: 78
DRG: 181 | End: 2018-11-14
Attending: FAMILY MEDICINE
Payer: MEDICARE

## 2018-11-14 DIAGNOSIS — J44.9 CHRONIC OBSTRUCTIVE PULMONARY DISEASE, UNSPECIFIED COPD TYPE (HCC): Primary | Chronic | ICD-10-CM

## 2018-11-14 DIAGNOSIS — J90 PLEURAL EFFUSION: ICD-10-CM

## 2018-11-14 LAB
-: NORMAL
ABSOLUTE BANDS #: 0.26 K/UL (ref 0–1)
ABSOLUTE EOS #: 0.09 K/UL (ref 0–0.4)
ABSOLUTE IMMATURE GRANULOCYTE: ABNORMAL K/UL (ref 0–0.3)
ABSOLUTE LYMPH #: 0.43 K/UL (ref 1–4.8)
ABSOLUTE MONO #: 1.36 K/UL (ref 0.1–1.3)
AMORPHOUS: NORMAL
ANION GAP SERPL CALCULATED.3IONS-SCNC: 11 MMOL/L (ref 9–17)
BACTERIA: NORMAL
BANDS: 3 % (ref 0–10)
BASOPHILS # BLD: 0 % (ref 0–2)
BASOPHILS ABSOLUTE: 0 K/UL (ref 0–0.2)
BILIRUBIN URINE: NEGATIVE
BNP INTERPRETATION: ABNORMAL
BUN BLDV-MCNC: 19 MG/DL (ref 8–23)
BUN/CREAT BLD: ABNORMAL (ref 9–20)
CALCIUM SERPL-MCNC: 9.4 MG/DL (ref 8.6–10.4)
CASTS UA: NORMAL /LPF
CHLORIDE BLD-SCNC: 97 MMOL/L (ref 98–107)
CO2: 34 MMOL/L (ref 20–31)
COLOR: YELLOW
COMMENT UA: ABNORMAL
CREAT SERPL-MCNC: 0.92 MG/DL (ref 0.7–1.2)
CRYSTALS, UA: NORMAL /HPF
DIFFERENTIAL TYPE: ABNORMAL
EOSINOPHILS RELATIVE PERCENT: 1 % (ref 0–4)
EPITHELIAL CELLS UA: NORMAL /HPF
GFR AFRICAN AMERICAN: >60 ML/MIN
GFR NON-AFRICAN AMERICAN: >60 ML/MIN
GFR SERPL CREATININE-BSD FRML MDRD: ABNORMAL ML/MIN/{1.73_M2}
GFR SERPL CREATININE-BSD FRML MDRD: ABNORMAL ML/MIN/{1.73_M2}
GLUCOSE BLD-MCNC: 103 MG/DL (ref 70–99)
GLUCOSE URINE: NEGATIVE
GLUCOSE, FLUID: 96 MG/DL
HCT VFR BLD CALC: 39 % (ref 41–53)
HEMOGLOBIN: 12.9 G/DL (ref 13.5–17.5)
IMMATURE GRANULOCYTES: ABNORMAL %
INR BLD: 1.1
KETONES, URINE: NEGATIVE
LACTATE DEHYDROGENASE, FLUID: 473 U/L
LEUKOCYTE ESTERASE, URINE: NEGATIVE
LYMPHOCYTES # BLD: 5 % (ref 24–44)
MCH RBC QN AUTO: 32.3 PG (ref 26–34)
MCHC RBC AUTO-ENTMCNC: 33.1 G/DL (ref 31–37)
MCV RBC AUTO: 97.4 FL (ref 80–100)
METAMYELOCYTES ABSOLUTE COUNT: 0.17 K/UL
METAMYELOCYTES: 2 %
MONOCYTES # BLD: 16 % (ref 1–7)
MORPHOLOGY: ABNORMAL
MUCUS: NORMAL
NITRITE, URINE: NEGATIVE
NRBC AUTOMATED: ABNORMAL PER 100 WBC
OTHER OBSERVATIONS UA: NORMAL
PARTIAL THROMBOPLASTIN TIME: 30 SEC (ref 23–31)
PDW BLD-RTO: 13.5 % (ref 11.5–14.9)
PH FLUID: 8
PH UA: 7 (ref 5–8)
PLATELET # BLD: 237 K/UL (ref 150–450)
PLATELET ESTIMATE: ABNORMAL
PMV BLD AUTO: 8.4 FL (ref 6–12)
POTASSIUM SERPL-SCNC: 4.5 MMOL/L (ref 3.7–5.3)
PRO-BNP: 2298 PG/ML
PROTEIN UA: NEGATIVE
PROTHROMBIN TIME: 11.9 SEC (ref 9.7–12)
RBC # BLD: 4 M/UL (ref 4.5–5.9)
RBC # BLD: ABNORMAL 10*6/UL
RBC UA: NORMAL /HPF
RENAL EPITHELIAL, UA: NORMAL /HPF
SEG NEUTROPHILS: 73 % (ref 36–66)
SEGMENTED NEUTROPHILS ABSOLUTE COUNT: 6.19 K/UL (ref 1.3–9.1)
SODIUM BLD-SCNC: 142 MMOL/L (ref 135–144)
SPECIFIC GRAVITY UA: 1.01 (ref 1–1.03)
SPECIMEN TYPE: NORMAL
TOTAL PROTEIN, BODY FLUID: 4.2 G/DL
TRICHOMONAS: NORMAL
TROPONIN INTERP: NORMAL
TROPONIN T: <0.03 NG/ML
TURBIDITY: CLEAR
URINE HGB: ABNORMAL
UROBILINOGEN, URINE: NORMAL
WBC # BLD: 8.5 K/UL (ref 3.5–11)
WBC # BLD: ABNORMAL 10*3/UL
WBC UA: NORMAL /HPF
YEAST: NORMAL

## 2018-11-14 PROCEDURE — G0379 DIRECT REFER HOSPITAL OBSERV: HCPCS

## 2018-11-14 PROCEDURE — 0W9B3ZZ DRAINAGE OF LEFT PLEURAL CAVITY, PERCUTANEOUS APPROACH: ICD-10-PCS | Performed by: RADIOLOGY

## 2018-11-14 PROCEDURE — 6360000002 HC RX W HCPCS: Performed by: INTERNAL MEDICINE

## 2018-11-14 PROCEDURE — 87206 SMEAR FLUORESCENT/ACID STAI: CPT

## 2018-11-14 PROCEDURE — 88112 CYTOPATH CELL ENHANCE TECH: CPT

## 2018-11-14 PROCEDURE — 88342 IMHCHEM/IMCYTCHM 1ST ANTB: CPT

## 2018-11-14 PROCEDURE — 83615 LACTATE (LD) (LDH) ENZYME: CPT

## 2018-11-14 PROCEDURE — 88313 SPECIAL STAINS GROUP 2: CPT

## 2018-11-14 PROCEDURE — 84484 ASSAY OF TROPONIN QUANT: CPT

## 2018-11-14 PROCEDURE — 87102 FUNGUS ISOLATION CULTURE: CPT

## 2018-11-14 PROCEDURE — 88341 IMHCHEM/IMCYTCHM EA ADD ANTB: CPT

## 2018-11-14 PROCEDURE — 71046 X-RAY EXAM CHEST 2 VIEWS: CPT

## 2018-11-14 PROCEDURE — 36415 COLL VENOUS BLD VENIPUNCTURE: CPT

## 2018-11-14 PROCEDURE — 96372 THER/PROPH/DIAG INJ SC/IM: CPT

## 2018-11-14 PROCEDURE — 85025 COMPLETE CBC W/AUTO DIFF WBC: CPT

## 2018-11-14 PROCEDURE — 71045 X-RAY EXAM CHEST 1 VIEW: CPT

## 2018-11-14 PROCEDURE — 94664 DEMO&/EVAL PT USE INHALER: CPT

## 2018-11-14 PROCEDURE — 88305 TISSUE EXAM BY PATHOLOGIST: CPT

## 2018-11-14 PROCEDURE — 81001 URINALYSIS AUTO W/SCOPE: CPT

## 2018-11-14 PROCEDURE — 2709999900 IR GUIDED THORACENTESIS PLEURAL

## 2018-11-14 PROCEDURE — 32555 ASPIRATE PLEURA W/ IMAGING: CPT | Performed by: RADIOLOGY

## 2018-11-14 PROCEDURE — 2580000003 HC RX 258: Performed by: RADIOLOGY

## 2018-11-14 PROCEDURE — 6370000000 HC RX 637 (ALT 250 FOR IP): Performed by: FAMILY MEDICINE

## 2018-11-14 PROCEDURE — 87116 MYCOBACTERIA CULTURE: CPT

## 2018-11-14 PROCEDURE — 6360000002 HC RX W HCPCS: Performed by: FAMILY MEDICINE

## 2018-11-14 PROCEDURE — G0378 HOSPITAL OBSERVATION PER HR: HCPCS

## 2018-11-14 PROCEDURE — 96374 THER/PROPH/DIAG INJ IV PUSH: CPT

## 2018-11-14 PROCEDURE — 87070 CULTURE OTHR SPECIMN AEROBIC: CPT

## 2018-11-14 PROCEDURE — 87015 SPECIMEN INFECT AGNT CONCNTJ: CPT

## 2018-11-14 PROCEDURE — 87075 CULTR BACTERIA EXCEPT BLOOD: CPT

## 2018-11-14 PROCEDURE — 87205 SMEAR GRAM STAIN: CPT

## 2018-11-14 PROCEDURE — 89051 BODY FLUID CELL COUNT: CPT

## 2018-11-14 PROCEDURE — 84157 ASSAY OF PROTEIN OTHER: CPT

## 2018-11-14 PROCEDURE — 82945 GLUCOSE OTHER FLUID: CPT

## 2018-11-14 PROCEDURE — 85610 PROTHROMBIN TIME: CPT

## 2018-11-14 PROCEDURE — 83986 ASSAY PH BODY FLUID NOS: CPT

## 2018-11-14 PROCEDURE — 83880 ASSAY OF NATRIURETIC PEPTIDE: CPT

## 2018-11-14 PROCEDURE — 85730 THROMBOPLASTIN TIME PARTIAL: CPT

## 2018-11-14 PROCEDURE — 88108 CYTOPATH CONCENTRATE TECH: CPT

## 2018-11-14 PROCEDURE — 80048 BASIC METABOLIC PNL TOTAL CA: CPT

## 2018-11-14 RX ORDER — FINASTERIDE 5 MG/1
5 TABLET, FILM COATED ORAL DAILY
Status: DISCONTINUED | OUTPATIENT
Start: 2018-11-14 | End: 2018-11-19 | Stop reason: HOSPADM

## 2018-11-14 RX ORDER — ACETAMINOPHEN 325 MG/1
650 TABLET ORAL EVERY 4 HOURS PRN
Status: DISCONTINUED | OUTPATIENT
Start: 2018-11-14 | End: 2018-11-19 | Stop reason: HOSPADM

## 2018-11-14 RX ORDER — POTASSIUM CHLORIDE 1.5 G/1.77G
20 POWDER, FOR SOLUTION ORAL DAILY
Status: DISCONTINUED | OUTPATIENT
Start: 2018-11-14 | End: 2018-11-17

## 2018-11-14 RX ORDER — SODIUM CHLORIDE 9 MG/ML
INJECTION, SOLUTION INTRAVENOUS CONTINUOUS
Status: DISCONTINUED | OUTPATIENT
Start: 2018-11-14 | End: 2018-11-19 | Stop reason: HOSPADM

## 2018-11-14 RX ORDER — TAMSULOSIN HYDROCHLORIDE 0.4 MG/1
0.8 CAPSULE ORAL NIGHTLY
Status: DISCONTINUED | OUTPATIENT
Start: 2018-11-14 | End: 2018-11-19 | Stop reason: HOSPADM

## 2018-11-14 RX ORDER — ASPIRIN 81 MG/1
81 TABLET ORAL DAILY
Status: DISCONTINUED | OUTPATIENT
Start: 2018-11-14 | End: 2018-11-19 | Stop reason: HOSPADM

## 2018-11-14 RX ORDER — METOPROLOL TARTRATE 50 MG/1
50 TABLET, FILM COATED ORAL DAILY
Status: DISCONTINUED | OUTPATIENT
Start: 2018-11-14 | End: 2018-11-15

## 2018-11-14 RX ORDER — ALBUTEROL SULFATE 90 UG/1
2 AEROSOL, METERED RESPIRATORY (INHALATION) 4 TIMES DAILY PRN
Status: DISCONTINUED | OUTPATIENT
Start: 2018-11-14 | End: 2018-11-15

## 2018-11-14 RX ORDER — FUROSEMIDE 10 MG/ML
40 INJECTION INTRAMUSCULAR; INTRAVENOUS 2 TIMES DAILY
Status: DISCONTINUED | OUTPATIENT
Start: 2018-11-14 | End: 2018-11-15

## 2018-11-14 RX ORDER — ATORVASTATIN CALCIUM 40 MG/1
40 TABLET, FILM COATED ORAL NIGHTLY
Status: DISCONTINUED | OUTPATIENT
Start: 2018-11-14 | End: 2018-11-19 | Stop reason: HOSPADM

## 2018-11-14 RX ADMIN — ENOXAPARIN SODIUM 30 MG: 30 INJECTION SUBCUTANEOUS at 21:39

## 2018-11-14 RX ADMIN — ATORVASTATIN CALCIUM 40 MG: 40 TABLET, FILM COATED ORAL at 21:39

## 2018-11-14 RX ADMIN — FUROSEMIDE 40 MG: 10 INJECTION, SOLUTION INTRAMUSCULAR; INTRAVENOUS at 18:24

## 2018-11-14 RX ADMIN — SODIUM CHLORIDE: 9 INJECTION, SOLUTION INTRAVENOUS at 18:24

## 2018-11-14 RX ADMIN — TAMSULOSIN HYDROCHLORIDE 0.8 MG: 0.4 CAPSULE ORAL at 21:39

## 2018-11-14 ASSESSMENT — PAIN SCALES - GENERAL: PAINLEVEL_OUTOF10: 0

## 2018-11-14 NOTE — BRIEF OP NOTE
Brief Postoperative Note    Mo Steven  YOB: 1940  [id number]    Pre-operative Diagnosis: Malignant pleural effusion    Post-operative Diagnosis: Same    Procedure: Left thoracentesis    Anesthesia: Local    Surgeons/Assistants: Lorna Dupont MD    Estimated Blood Loss: less than 50     Complications: None    Specimens: Was Not Obtained    Findings: 1550 mls dark serosanguinous fluid removed via 5 F centesis needle under US guidance.     Electronically signed by Lorna Dupont MD on 11/14/2018 at 3:58 PM

## 2018-11-14 NOTE — PROGRESS NOTES
Patient returned from IR. Lung sounds remain diminished in the bases with exp wheezing. Pt assisted back to chair. Patient remains stable.

## 2018-11-14 NOTE — PROGRESS NOTES
Report received from IR. 1550 fluid removed from left side under local lidocaine. Patient tolerated well and vital signs stable.

## 2018-11-15 LAB
ABSOLUTE BANDS #: 0.09 K/UL (ref 0–1)
ABSOLUTE EOS #: 0.09 K/UL (ref 0–0.4)
ABSOLUTE IMMATURE GRANULOCYTE: ABNORMAL K/UL (ref 0–0.3)
ABSOLUTE LYMPH #: 1.32 K/UL (ref 1–4.8)
ABSOLUTE MONO #: 0.79 K/UL (ref 0.1–1.3)
ANION GAP SERPL CALCULATED.3IONS-SCNC: 11 MMOL/L (ref 9–17)
ANION GAP SERPL CALCULATED.3IONS-SCNC: 9 MMOL/L (ref 9–17)
APPEARANCE FLUID: ABNORMAL
BANDS: 1 % (ref 0–10)
BASO FLUID: ABNORMAL %
BASOPHILS # BLD: 0 % (ref 0–2)
BASOPHILS ABSOLUTE: 0 K/UL (ref 0–0.2)
BUN BLDV-MCNC: 18 MG/DL (ref 8–23)
BUN BLDV-MCNC: 19 MG/DL (ref 8–23)
BUN/CREAT BLD: ABNORMAL (ref 9–20)
BUN/CREAT BLD: ABNORMAL (ref 9–20)
CALCIUM SERPL-MCNC: 8.9 MG/DL (ref 8.6–10.4)
CALCIUM SERPL-MCNC: 8.9 MG/DL (ref 8.6–10.4)
CHLORIDE BLD-SCNC: 95 MMOL/L (ref 98–107)
CHLORIDE BLD-SCNC: 98 MMOL/L (ref 98–107)
CO2: 34 MMOL/L (ref 20–31)
CO2: 34 MMOL/L (ref 20–31)
COLOR FLUID: ABNORMAL
CREAT SERPL-MCNC: 0.84 MG/DL (ref 0.7–1.2)
CREAT SERPL-MCNC: 1.03 MG/DL (ref 0.7–1.2)
DIFFERENTIAL TYPE: ABNORMAL
EOSINOPHIL FLUID: 2 %
EOSINOPHILS RELATIVE PERCENT: 1 % (ref 0–4)
FLUID DIFF COMMENT: ABNORMAL
GFR AFRICAN AMERICAN: >60 ML/MIN
GFR AFRICAN AMERICAN: >60 ML/MIN
GFR NON-AFRICAN AMERICAN: >60 ML/MIN
GFR NON-AFRICAN AMERICAN: >60 ML/MIN
GFR SERPL CREATININE-BSD FRML MDRD: ABNORMAL ML/MIN/{1.73_M2}
GLUCOSE BLD-MCNC: 114 MG/DL (ref 70–99)
GLUCOSE BLD-MCNC: 117 MG/DL (ref 70–99)
HCT VFR BLD CALC: 38.1 % (ref 41–53)
HEMOGLOBIN: 12.8 G/DL (ref 13.5–17.5)
IMMATURE GRANULOCYTES: ABNORMAL %
LYMPHOCYTES # BLD: 15 % (ref 24–44)
LYMPHOCYTES, BODY FLUID: 41 %
MAGNESIUM: 1.9 MG/DL (ref 1.6–2.6)
MAGNESIUM: 2.1 MG/DL (ref 1.6–2.6)
MCH RBC QN AUTO: 32.4 PG (ref 26–34)
MCHC RBC AUTO-ENTMCNC: 33.6 G/DL (ref 31–37)
MCV RBC AUTO: 96.4 FL (ref 80–100)
METAMYELOCYTES ABSOLUTE COUNT: 0.26 K/UL
METAMYELOCYTES: 3 %
MONOCYTE, FLUID: 18 %
MONOCYTES # BLD: 9 % (ref 1–7)
MORPHOLOGY: NORMAL
NEUTROPHIL, FLUID: 39 %
NRBC AUTOMATED: ABNORMAL PER 100 WBC
OTHER CELLS FLUID: ABNORMAL %
PDW BLD-RTO: 13.5 % (ref 11.5–14.9)
PLATELET # BLD: 219 K/UL (ref 150–450)
PLATELET ESTIMATE: ABNORMAL
PMV BLD AUTO: 8.5 FL (ref 6–12)
POTASSIUM SERPL-SCNC: 3.3 MMOL/L (ref 3.7–5.3)
POTASSIUM SERPL-SCNC: 3.7 MMOL/L (ref 3.7–5.3)
RBC # BLD: 3.96 M/UL (ref 4.5–5.9)
RBC # BLD: ABNORMAL 10*6/UL
RBC FLUID: ABNORMAL /MM3
SEG NEUTROPHILS: 71 % (ref 36–66)
SEGMENTED NEUTROPHILS ABSOLUTE COUNT: 6.25 K/UL (ref 1.3–9.1)
SODIUM BLD-SCNC: 140 MMOL/L (ref 135–144)
SODIUM BLD-SCNC: 141 MMOL/L (ref 135–144)
SPECIMEN TYPE: ABNORMAL
TROPONIN INTERP: NORMAL
TROPONIN T: <0.03 NG/ML
TSH SERPL DL<=0.05 MIU/L-ACNC: 1.01 MIU/L (ref 0.3–5)
WBC # BLD: 8.8 K/UL (ref 3.5–11)
WBC # BLD: ABNORMAL 10*3/UL
WBC FLUID: 1529 /MM3

## 2018-11-15 PROCEDURE — 6360000002 HC RX W HCPCS: Performed by: FAMILY MEDICINE

## 2018-11-15 PROCEDURE — 80048 BASIC METABOLIC PNL TOTAL CA: CPT

## 2018-11-15 PROCEDURE — 2060000000 HC ICU INTERMEDIATE R&B

## 2018-11-15 PROCEDURE — 2580000003 HC RX 258: Performed by: RADIOLOGY

## 2018-11-15 PROCEDURE — 36415 COLL VENOUS BLD VENIPUNCTURE: CPT

## 2018-11-15 PROCEDURE — 85025 COMPLETE CBC W/AUTO DIFF WBC: CPT

## 2018-11-15 PROCEDURE — 6370000000 HC RX 637 (ALT 250 FOR IP): Performed by: FAMILY MEDICINE

## 2018-11-15 PROCEDURE — 96376 TX/PRO/DX INJ SAME DRUG ADON: CPT

## 2018-11-15 PROCEDURE — 99222 1ST HOSP IP/OBS MODERATE 55: CPT | Performed by: INTERNAL MEDICINE

## 2018-11-15 PROCEDURE — 96372 THER/PROPH/DIAG INJ SC/IM: CPT

## 2018-11-15 PROCEDURE — 6370000000 HC RX 637 (ALT 250 FOR IP): Performed by: INTERNAL MEDICINE

## 2018-11-15 PROCEDURE — 6360000002 HC RX W HCPCS: Performed by: INTERNAL MEDICINE

## 2018-11-15 PROCEDURE — 84443 ASSAY THYROID STIM HORMONE: CPT

## 2018-11-15 PROCEDURE — 84484 ASSAY OF TROPONIN QUANT: CPT

## 2018-11-15 PROCEDURE — 83735 ASSAY OF MAGNESIUM: CPT

## 2018-11-15 RX ORDER — POTASSIUM CHLORIDE 20 MEQ/1
20 TABLET, EXTENDED RELEASE ORAL ONCE
Status: COMPLETED | OUTPATIENT
Start: 2018-11-16 | End: 2018-11-16

## 2018-11-15 RX ORDER — FUROSEMIDE 10 MG/ML
40 INJECTION INTRAMUSCULAR; INTRAVENOUS DAILY
Status: DISCONTINUED | OUTPATIENT
Start: 2018-11-16 | End: 2018-11-17

## 2018-11-15 RX ORDER — METOPROLOL TARTRATE 50 MG/1
50 TABLET, FILM COATED ORAL 2 TIMES DAILY
Status: DISCONTINUED | OUTPATIENT
Start: 2018-11-15 | End: 2018-11-19 | Stop reason: HOSPADM

## 2018-11-15 RX ORDER — POTASSIUM CHLORIDE 20 MEQ/1
40 TABLET, EXTENDED RELEASE ORAL ONCE
Status: COMPLETED | OUTPATIENT
Start: 2018-11-15 | End: 2018-11-15

## 2018-11-15 RX ORDER — LEVALBUTEROL INHALATION SOLUTION 0.63 MG/3ML
0.63 SOLUTION RESPIRATORY (INHALATION) EVERY 4 HOURS PRN
Status: DISCONTINUED | OUTPATIENT
Start: 2018-11-15 | End: 2018-11-18 | Stop reason: SDUPTHER

## 2018-11-15 RX ADMIN — FUROSEMIDE 40 MG: 10 INJECTION, SOLUTION INTRAMUSCULAR; INTRAVENOUS at 17:33

## 2018-11-15 RX ADMIN — FUROSEMIDE 40 MG: 10 INJECTION, SOLUTION INTRAMUSCULAR; INTRAVENOUS at 10:31

## 2018-11-15 RX ADMIN — POTASSIUM CHLORIDE 40 MEQ: 20 TABLET, EXTENDED RELEASE ORAL at 13:51

## 2018-11-15 RX ADMIN — ATORVASTATIN CALCIUM 40 MG: 40 TABLET, FILM COATED ORAL at 22:12

## 2018-11-15 RX ADMIN — MOMETASONE FUROATE AND FORMOTEROL FUMARATE DIHYDRATE 2 PUFF: 200; 5 AEROSOL RESPIRATORY (INHALATION) at 04:31

## 2018-11-15 RX ADMIN — ENOXAPARIN SODIUM 30 MG: 30 INJECTION SUBCUTANEOUS at 10:31

## 2018-11-15 RX ADMIN — FINASTERIDE 5 MG: 5 TABLET, FILM COATED ORAL at 10:31

## 2018-11-15 RX ADMIN — ASPIRIN 81 MG: 81 TABLET, COATED ORAL at 10:31

## 2018-11-15 RX ADMIN — METOPROLOL TARTRATE 50 MG: 50 TABLET ORAL at 10:31

## 2018-11-15 RX ADMIN — IPRATROPIUM BROMIDE 2 PUFF: 17 AEROSOL, METERED RESPIRATORY (INHALATION) at 17:33

## 2018-11-15 RX ADMIN — TAMSULOSIN HYDROCHLORIDE 0.8 MG: 0.4 CAPSULE ORAL at 22:12

## 2018-11-15 RX ADMIN — IPRATROPIUM BROMIDE 2 PUFF: 17 AEROSOL, METERED RESPIRATORY (INHALATION) at 13:51

## 2018-11-15 RX ADMIN — METOPROLOL TARTRATE 50 MG: 50 TABLET ORAL at 22:12

## 2018-11-15 RX ADMIN — SODIUM CHLORIDE: 9 INJECTION, SOLUTION INTRAVENOUS at 04:21

## 2018-11-15 RX ADMIN — MOMETASONE FUROATE AND FORMOTEROL FUMARATE DIHYDRATE 2 PUFF: 200; 5 AEROSOL RESPIRATORY (INHALATION) at 17:33

## 2018-11-15 NOTE — H&P
Inhale 2 puffs into the lungs every 12 hours        finasteride (PROSCAR) 5 MG tablet Take 1 tablet by mouth daily 30 tablet 11    atorvastatin (LIPITOR) 40 MG tablet Take 40 mg by mouth nightly.        metoprolol-hydrochlorothiazide (LOPRESSOR HCT) 50-25 MG per tablet Take 1 tablet by mouth daily.        tamsulosin (FLOMAX) 0.4 MG capsule Take 0.8 mg by mouth nightly Takes 2 tablets to equal 0.8 mg        magnesium hydroxide (MILK OF MAGNESIA) 400 MG/5ML suspension Take by mouth daily as needed for Constipation           Review of Systems   Constitutional: Negative for chills, fever, malaise/fatigue and weight loss. HENT: Negative for congestion, ear discharge, hearing loss, nosebleeds and sinus pain. Eyes: Negative for blurred vision and double vision. Respiratory: Negative for cough. Cardiovascular: Negative for chest pain. He sees Dr Tanner Nicole for A Fib,  No recent chest pain   Gastrointestinal: Negative for abdominal pain, nausea and vomiting. Genitourinary: Negative for dysuria and urgency. Musculoskeletal:        Bilateral knee replacements and did well,    Skin: Negative for itching and rash. Neurological: Negative for dizziness, tremors and headaches. Endo/Heme/Allergies: Does not bruise/bleed easily. Psychiatric/Behavioral: Negative for depression, substance abuse and suicidal ideas.  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 Has Newly found lung masses, Renal carcinoma*     GENERAL PHYSICAL EXAM:         Vitals: /82   Pulse 79   Temp 97.7 °F (36.5 °C) (Oral)   Resp 18   Ht 6' 2.02\" (1.88 m)   Wt 263 lb 14.3 oz (119.7 kg)   SpO2 100%   BMI 33.87 kg/m²  Body mass index is 33.87 kg/m².      GENERAL APPEARANCE:  Severino Lovell is [de-identified] y.o.,  male, not obese, Well developed, nourished, conscious, alert. He has some abd pain          SKIN:  Warm, dry,  Skin intact,  Has no rashes.      HEAD:  Normocephalic.  Face

## 2018-11-15 NOTE — PROGRESS NOTES
atorvastatin  40 mg Oral Nightly    finasteride  5 mg Oral Daily    metoprolol tartrate  50 mg Oral Daily    mometasone-formoterol  2 puff Inhalation Q12H    potassium chloride  20 mEq Oral Daily    tamsulosin  0.8 mg Oral Nightly    pneumococcal 13-valent conjugate  0.5 mL Intramuscular Once    enoxaparin  30 mg Subcutaneous BID     Continuous Infusions:   sodium chloride 20 mL/hr at 11/15/18 0421     PRN Meds:levalbuterol, magnesium hydroxide, acetaminophen    Objective:    Physical Exam:  Vitals: BP (!) 141/62   Pulse 84   Temp 97.9 °F (36.6 °C) (Oral)   Resp 16   Ht 6' 2\" (1.88 m)   Wt 263 lb 0.1 oz (119.3 kg)   SpO2 92%   BMI 33.77 kg/m²   24 hour intake/output:  Intake/Output Summary (Last 24 hours) at 11/15/18 1036  Last data filed at 11/15/18 0239   Gross per 24 hour   Intake                0 ml   Output              375 ml   Net             -375 ml     Last 3 weights:   Wt Readings from Last 3 Encounters:   11/14/18 263 lb 0.1 oz (119.3 kg)   11/06/18 263 lb 0.1 oz (119.3 kg)   11/02/18 263 lb (119.3 kg)       Physical Examination:   General appearance - alert, well appearing, and in no distress    Mental status - alert, oriented to person, place, and time  Heent-PERRLA wnl  Chest - clear to auscultation, no wheezes, rales or rhonchi, symmetric air entry  Heart - normal rate, regular rhythm, normal S1, S2, no murmurs, rubs, clicks or gallops  Abdomen - soft, nontender, nondistended, no masses or organomegaly  Neurological - alert, oriented, normal speech, no focal findings or movement disorder   Extremities - peripheral pulses normal, no pedal edema, no clubbing or cyanosisjjust dryness to his lower extremities no edema  Skin - normal coloration and turgor, no rashes, no suspicious skin lesions noted         CBC:   Recent Labs      11/14/18   1533   WBC  8.5   HGB  12.9*   PLT  237     BMP:  Recent Labs      11/14/18   1533   NA  142   K  4.5   CL  97*   CO2  34*   BUN  19   CREATININE  0.92 GLUCOSE  103*     Glucose:No results for input(s): POCGLU in the last 72 hours. HgbA1C: No results for input(s): LABA1C in the last 72 hours. INR:   Recent Labs      11/14/18   1533   INR  1.1     CARDIAC ENZYMES:No results for input(s): CKTOTAL, CKMB, CKMBINDEX, TROPONINI in the last 72 hours. BNP: No results for input(s): BNP in the last 72 hours. Lipids: No results for input(s): CHOL, TRIG, HDL, LDLCALC in the last 72 hours.     Invalid input(s): LDL  ABGs: No results found for: PH, PCO2, PO2, HCO3, O2SAT  Thyroid:   Lab Results   Component Value Date    TSH 1.59 05/13/2014      Urinalysis: Clarity, UA   Date Value Ref Range Status   08/03/2018 Cloudy  Final     Color, UA   Date Value Ref Range Status   11/14/2018 YELLOW YEL Final     pH, UA   Date Value Ref Range Status   11/14/2018 7.0 5.0 - 8.0 Final     Specific Gravity, UA   Date Value Ref Range Status   11/14/2018 1.006 1.000 - 1.030 Final     Protein, UA   Date Value Ref Range Status   11/14/2018 NEGATIVE NEG Final     RBC, UA   Date Value Ref Range Status   11/14/2018 TOO NUMEROUS TO COUNT /HPF Final     Blood, UA POC   Date Value Ref Range Status   08/03/2018 Large  Final     Bacteria, UA   Date Value Ref Range Status   11/14/2018 None NONE Final     Nitrite, Urine   Date Value Ref Range Status   11/14/2018 NEGATIVE NEG Final     WBC, UA   Date Value Ref Range Status   11/14/2018 2 TO 5 /HPF Final     Leukocyte Esterase, Urine   Date Value Ref Range Status   11/14/2018 NEGATIVE NEG Final     Yeast, UA   Date Value Ref Range Status   11/14/2018 NOT REPORTED NONE Final     Glucose, Ur   Date Value Ref Range Status   11/14/2018 NEGATIVE NEG Final     Bilirubin, UA   Date Value Ref Range Status   10/17/2017 -  Final     Bilirubin Urine   Date Value Ref Range Status   11/14/2018 NEGATIVE NEG Final       Labs reviewed from yesterday chest x-ray reviewed      Assessment:  Worsening pleural effusion and    Dyspnea    Lethargic    Metastatic renal cell CVA

## 2018-11-15 NOTE — CONSULTS
at 11/15/18 1031    atorvastatin (LIPITOR) tablet 40 mg  40 mg Oral Nightly Melchor T Erich, DO   40 mg at 11/14/18 2139    finasteride (PROSCAR) tablet 5 mg  5 mg Oral Daily Melchor T Erich, DO   5 mg at 11/15/18 1031    magnesium hydroxide (MILK OF MAGNESIA) 400 MG/5ML suspension 5 mL  5 mL Oral Daily PRN Norvel Reel, DO        metoprolol tartrate (LOPRESSOR) tablet 50 mg  50 mg Oral Daily Melchor T Erich, DO   50 mg at 11/15/18 1031    mometasone-formoterol (DULERA) 200-5 MCG/ACT inhaler 2 puff  2 puff Inhalation Q12H Melchor T Erich, DO   2 puff at 11/15/18 0431    potassium chloride (KLOR-CON) packet 20 mEq  20 mEq Oral Daily Norvel Reel, DO   Stopped at 11/15/18 1031    tamsulosin (FLOMAX) capsule 0.8 mg  0.8 mg Oral Nightly Melchor T Erich, DO   0.8 mg at 11/14/18 2139    0.9 % sodium chloride infusion   Intravenous Continuous Vienet Jackson MD 20 mL/hr at 11/15/18 0421      acetaminophen (TYLENOL) tablet 650 mg  650 mg Oral Q4H PRN Vineet Jackson MD        pneumococcal 13-valent conjugate (PREVNAR) injection 0.5 mL  0.5 mL Intramuscular Once Melchor T Erich, DO        enoxaparin (LOVENOX) injection 30 mg  30 mg Subcutaneous BID Alma Rosa Walls MD   30 mg at 11/15/18 1031       Social History:    Social History     Social History    Marital status:      Spouse name: Alan Freeman Number of children: 3    Years of education: N/A     Occupational History    retired [employer]     Social History Main Topics    Smoking status: Former Smoker     Packs/day: 0.50     Years: 60.00     Start date: 1948     Quit date: 4/17/2017    Smokeless tobacco: Never Used    Alcohol use No      Comment: quit in 2000    Drug use: No    Sexual activity: Yes     Partners: Female     Other Topics Concern    Not on file     Social History Narrative    No narrative on file       Family History:    Family History   Problem Relation Age of Onset    Colon Cancer Brother     Diabetes Mother    [facility] Other Father         black lung disease    No Known Problems Maternal Grandmother     No Known Problems Maternal Grandfather     No Known Problems Paternal Grandmother     No Known Problems Paternal Grandfather     Breast Cancer Sister     No Known Problems Sister        Review of Systems:      · Constitutional: no weight loss or gain, no fever or chills. · Eyes: No new visual changes. · ENT: No new hearing loss. · Cardiovascular: see HPI. · Respiratory: No cough. No hemoptysis. · Gastrointestinal: No abdominal pain, no blood in stools. · Genitourinary: + hematuria or increased frequency. · Musculoskeletal:  No new gait disturbance. · Integumentary: No rash or pruritis. · Neurological: No headache. No seizures or recent CVA/TIA. · Psychiatric: No anxiety or depression. · Hematologic/Lymphatic: No abnormal bruising or bleeding. · Allergic/Immunologic: No new allergies. Physical Exam   Vital Signs: /68   Pulse 82   Temp 97.9 °F (36.6 °C) (Oral)   Resp 16   Ht 6' 2\" (1.88 m)   Wt 263 lb 0.1 oz (119.3 kg)   SpO2 92%   BMI 33.77 kg/m²        Admission Weight: 263 lb 0.1 oz (119.3 kg)     General appearance: Awake, Alert, WD, WN, pleasant & Cooperative. Up in chair. Head: Normocephalic, atraumatic. Eyes: EOM intact. Neck: no JVD, no carotid bruits, no thyromegaly. Chest: Clear bilaterally. No chest wall tenderness. Cardiac: Irregular rate and rhythm, no S3 gallop, no significant audible murmur or rubs or clicks. Abdomen: Soft, non-tender. Extremities: no cyanosis or clubbing, +leg edema. No calf tenderness. Pulses:  Bilaterally symmetrical and intact radial pulses. Neurologic:  Able to move all 4 extremities. Skin:  No rashes. Warm and dry. EKG:  Reviewed. 2 D ECHO:    Report reviewed.     Labs:      CBC:   Recent Labs      11/14/18   1533  11/15/18   0840   WBC  8.5  8.8   HGB  12.9*  12.8*   HCT  39.0*  38.1*   MCV  97.4  96.4   PLT  237  219 Result Value Ref Range    Glucose 103 (H) 70 - 99 mg/dL    BUN 19 8 - 23 mg/dL    CREATININE 0.92 0.70 - 1.20 mg/dL    Bun/Cre Ratio NOT REPORTED 9 - 20    Calcium 9.4 8.6 - 10.4 mg/dL    Sodium 142 135 - 144 mmol/L    Potassium 4.5 3.7 - 5.3 mmol/L    Chloride 97 (L) 98 - 107 mmol/L    CO2 34 (H) 20 - 31 mmol/L    Anion Gap 11 9 - 17 mmol/L    GFR Non-African American >60 >60 mL/min    GFR African American >60 >60 mL/min    GFR Comment          GFR Staging NOT REPORTED    Brain Natriuretic Peptide    Collection Time: 11/14/18  3:33 PM   Result Value Ref Range    Pro-BNP 2,298 (H) <300 pg/mL    BNP Interpretation         Body fluid cell count with differential    Collection Time: 11/14/18  3:50 PM   Result Value Ref Range    Color, Fluid RED     Appearance, Fluid TURBID     WBC, Fluid 1,529 /mm3    RBC, Fluid 013,783 /mm3    Specimen Type . THORACENTESIS FLUID     Fluid Diff Comment Reviewed by pathologist:  Geraldine Alexander M.D. Neutrophil Count, Fluid 39 (H) 0 %    Lymphocytes, Body Fluid 41 (H) 0 %    Monocyte Count, Fluid 18 (H) 0 %    Eos, Fluid 2 (H) 0 %    Basos, Fluid      0 %    Other Cells, Fluid      0 %   Culture, Body Fluid    Collection Time: 11/14/18  3:50 PM   Result Value Ref Range    Specimen Description . THORACENTESIS FLUID LEFT     Special Requests NOT REPORTED     Direct Exam MANY NEUTROPHILS (A)     Direct Exam NO BACTERIA SEEN     Culture NO GROWTH 8 HOURS     Status Pending    Glucose, body fluid    Collection Time: 11/14/18  3:50 PM   Result Value Ref Range    Specimen Type . THORACENTESIS FLUID     Glucose, Fluid 96 mg/dL   Lactate dehydrogenase, body fluid    Collection Time: 11/14/18  3:50 PM   Result Value Ref Range    Specimen Type . THORACENTESIS FLUID     LD, Fluid 473 U/L   pH, body fluid    Collection Time: 11/14/18  3:50 PM   Result Value Ref Range    Specimen Type . THORACENTESIS FLUID     pH, Fluid 8.0    Protein, body fluid    Collection Time: 11/14/18  3:50 PM   Result Value Ref

## 2018-11-15 NOTE — CONSULTS
[address]                 [address] [facility], [address]                                  CONSULTATION    PATIENT NAME: Zev Nails                     :        1940  MED REC NO:   [id number]                              ROOM:       2111  ACCOUNT NO:   [de-identified]                           ADMIT DATE: 2018  PROVIDER:     Hina Beltre    PULMONARY CRITICAL CARE CONSULTATION    CONSULT DATE:  2018    REFERRING PROVIDER:  Dr. Jacob Lucero. REASON FOR CONSULTATION:  Acute respiratory distress, pleural effusion. HISTORY OF PRESENT ILLNESS:  This is an 38-year-old gentleman with a  history of COPD and hypertension and atrial fibrillation. He was seen  by Dr. Ravin Winters last month for the pleural effusion and was found to  have lung nodules thought to be mets secondary to his renal carcinoma. The patient was diagnosed with right-sided renal carcinoma few months  ago and then was found last month to have lung nodules thought to be  secondary to diet as well as the left pleural effusion, but they were  too small to clamp. This time, the patient presented with increased  short of breath. His chest x-ray showed moderate-to-large left-sided  pleural effusion and underwent thoracentesis by IR 1550 mL of  serosanguineous fluid was removed. The patient has been having  shortness of breath for the last two days worse with activity, better  with rest, had some wheezing and coughing, yellowish sputum, no  hemoptysis. REVIEW OF SYSTEMS:  GENERAL:  He denies fever or chills. NEUROLOGIC:  Had no headache. Noted some weakness. EYES:  No redness or watery eyes. ENT:  No nasal congestion or drip. CARDIAC:  No chest pain. RESPIRATORY:  As noted above, increased short of breath especially with  activity. Had some wheezing and cough. He does have a chronic cough,  but it is a little bit more than in the last few days.   GASTROINTESTINAL:  Had no abdominal

## 2018-11-15 NOTE — DISCHARGE INSTR - COC
done    Treatments at the Time of Hospital Discharge:   Respiratory Treatments: SEE MAR, Pt. Has New Nebulizer  Oxygen Therapy:  is not on home oxygen therapy. Ventilator:    - No ventilator support    Rehab Therapies: Physical Therapy and Occupational Therapy  Weight Bearing Status/Restrictions: No weight bearing restirctions  Other Medical Equipment (for information only, NOT a DME order):  walker  Other Treatments: skilled assessment, medication education, RESUME ALL PREVIOUS SERVICES. Home health care agency's  to evaluate patient two weeks prior to discharge from home health to determine post-discharge services. Patient's personal belongings (please select all that are sent with patient):  None    RN SIGNATURE:  Electronically signed by Bishop Angel RN on 11/20/18 at 9:53 AM    CASE MANAGEMENT/SOCIAL WORK SECTION    Inpatient Status Date: 11/15/18    Readmission Risk Assessment Score:  Readmission Risk              Risk of Unplanned Readmission:        13           Discharging to Facility/ [address]  [address] [address]  Phone [phone]  Fax  [phone]  ·       / signature: Electronically signed by Gracy Dubin, RN on 11/15/18 at 10:36 AM    PHYSICIAN SECTION    Prognosis: Good    Condition at Discharge: Stable    Rehab Potential (if transferring to Rehab): Good    Recommended Labs or Other Treatments After Discharge:     Physician Certification: I certify the above information and transfer of Fortunato Webster  is necessary for the continuing treatment of the diagnosis listed and that he requires [address] for less 30 days. Update Admission H&P: No change in H&P    PHYSICIAN SIGNATURE:  T/O Order.  Dr. Guillermo Jung/ Gauri José

## 2018-11-15 NOTE — CONSULTS
Today's Date: 11/15/2018  Patient Name: Severino Lovell  Date of admission: 11/14/2018  2:27 PM  Patient's age: [de-identified] y.o., 1940  Admission Dx: Pleural effusion [J90]  Dyspnea [R06.00]  SOB (shortness of breath) [R06.02]  Pleural effusion [J90]  Pleural effusion [J90]    Reason for Consult: known metastatic urothelial cancer  Requesting Physician: Herminio Lancaster DO    CHIEF COMPLAINT:  sob    History Obtained From: pt and chart    HISTORY OF PRESENT ILLNESS:      This is a [de-identified] Yo male recent diagnosis of metastatic urothelial cancer was admitted with worsening SOB. He had recurrent pleural effusion and had thoracentesis with removal of 1.5 L fluid. He had recent diagnosis of urothelial carcinoma with multple bilateral lung nodules/mets and was planning to start immunotherapy. Given his age and comorbidities, chemotherapy would be high risk. No fever chills. Past Medical History:   has a past medical history of Ambulates with cane; Arrhythmia; Arthritis; At risk for falling; Atrial fibrillation ([address] Atrial flutter ([address] BPH (benign prostatic hyperplasia); Cholelithiases; COPD (chronic obstructive pulmonary disease) ([address] Full dentures; Hard of hearing; Hematuria; History of pneumonia; Hyperlipidemia; Hypertension; Kidney stone; Renal pelvis transitional cell malignant neoplasm ([address] Skin abnormalities; Sleep apnea; and Wears eyeglasses. Past Surgical History:   has a past surgical history that includes Colonoscopy; Cataract removal with implant (Bilateral, 09/2013); Total knee arthroplasty (Right, 7-8-14); Total knee arthroplasty (Left, Sept 18, 2014); knee joint manipulation (Left, 11/25/14); other surgical history (Left, 2-10-15); and CYSTOSCOPY INSERTION / REMOVAL STENT / STONE (Right, 9/14/2018). Medications:    Prior to Admission medications    Medication Sig Start Date End Date Taking?  Authorizing Provider   metoprolol tartrate (LOPRESSOR) 50 MG tablet Take 1 tablet by mouth daily 10/29/18 Integument: negative for rash, skin lesions, bruises.    Hematologic/Lymphatic: negative for easy bruising, bleeding, lymphadenopathy, or petechiae   Endocrine: negative for heat or cold intolerance,weight changes, change in bowel habits and hair loss   Musculoskeletal: negative for myalgias, arthralgias, pain, joint swelling,and bone pain   Neurological: negative for headaches, dizziness, seizures, weakness, numbness    PHYSICAL EXAM:      BP 99/64   Pulse 74   Temp 98.2 °F (36.8 °C) (Oral)   Resp 14   Ht 6' 2\" (1.88 m)   Wt 263 lb 0.1 oz (119.3 kg)   SpO2 96%   BMI 33.77 kg/m²    Temp (24hrs), Av.1 °F (36.7 °C), Min:97.9 °F (36.6 °C), Max:98.3 °F (36.8 °C)    General appearance - well appearing, no in pain or distress   Mental status - alert and cooperative   Eyes - pupils equal and reactive, extraocular eye movements intact   Ears - bilateral TM's and external ear canals normal   Mouth - mucous membranes moist, pharynx normal without lesions   Neck - supple, no significant adenopathy   Lymphatics - no palpable lymphadenopathy, no hepatosplenomegaly   Chest - clear to auscultation, no wheezes, rales or rhonchi, symmetric air entry   Heart - normal rate, regular rhythm, normal S1, S2, no murmurs  Abdomen - soft, nontender, nondistended, no masses or organomegaly   Neurological - alert, oriented, normal speech, no focal findings or movement disorder noted   Musculoskeletal - no joint tenderness, deformity or swelling   Extremities - peripheral pulses normal, no pedal edema, no clubbing or cyanosis   Skin - normal coloration and turgor, no rashes, no suspicious skin lesions noted ,    DATA:    Labs:   CBC:   Recent Labs      18   1533  11/15/18   0840   WBC  8.5  8.8   HGB  12.9*  12.8*   HCT  39.0*  38.1*   PLT  237  219     BMP:   Recent Labs      18   1533  11/15/18   0840   NA  142  141   K  4.5  3.7   CO2  34*  34*   BUN  19  19   CREATININE  0.92  0.84   LABGLOM  >60  >60   GLUCOSE  103*

## 2018-11-16 ENCOUNTER — TELEPHONE (OUTPATIENT)
Dept: ONCOLOGY | Age: 78
End: 2018-11-16

## 2018-11-16 ENCOUNTER — APPOINTMENT (OUTPATIENT)
Dept: GENERAL RADIOLOGY | Age: 78
DRG: 181 | End: 2018-11-16
Attending: FAMILY MEDICINE
Payer: MEDICARE

## 2018-11-16 LAB
ABSOLUTE BANDS #: 0.19 K/UL (ref 0–1)
ABSOLUTE EOS #: 0.29 K/UL (ref 0–0.4)
ABSOLUTE IMMATURE GRANULOCYTE: ABNORMAL K/UL (ref 0–0.3)
ABSOLUTE LYMPH #: 1.06 K/UL (ref 1–4.8)
ABSOLUTE MONO #: 0.58 K/UL (ref 0.1–1.3)
ANION GAP SERPL CALCULATED.3IONS-SCNC: 12 MMOL/L (ref 9–17)
ATYPICAL LYMPHOCYTE ABSOLUTE COUNT: 1.06 K/UL
ATYPICAL LYMPHOCYTES: 11 %
BANDS: 2 % (ref 0–10)
BASOPHILS # BLD: 1 % (ref 0–2)
BASOPHILS ABSOLUTE: 0.1 K/UL (ref 0–0.2)
BUN BLDV-MCNC: 18 MG/DL (ref 8–23)
BUN/CREAT BLD: ABNORMAL (ref 9–20)
CALCIUM SERPL-MCNC: 9 MG/DL (ref 8.6–10.4)
CHLORIDE BLD-SCNC: 95 MMOL/L (ref 98–107)
CO2: 32 MMOL/L (ref 20–31)
CREAT SERPL-MCNC: 0.83 MG/DL (ref 0.7–1.2)
DIFFERENTIAL TYPE: ABNORMAL
EOSINOPHILS RELATIVE PERCENT: 3 % (ref 0–4)
GFR AFRICAN AMERICAN: >60 ML/MIN
GFR NON-AFRICAN AMERICAN: >60 ML/MIN
GFR SERPL CREATININE-BSD FRML MDRD: ABNORMAL ML/MIN/{1.73_M2}
GFR SERPL CREATININE-BSD FRML MDRD: ABNORMAL ML/MIN/{1.73_M2}
GLUCOSE BLD-MCNC: 107 MG/DL (ref 70–99)
HCT VFR BLD CALC: 40.3 % (ref 41–53)
HEMOGLOBIN: 13.3 G/DL (ref 13.5–17.5)
IMMATURE GRANULOCYTES: ABNORMAL %
LYMPHOCYTES # BLD: 11 % (ref 24–44)
MCH RBC QN AUTO: 31.7 PG (ref 26–34)
MCHC RBC AUTO-ENTMCNC: 33 G/DL (ref 31–37)
MCV RBC AUTO: 95.9 FL (ref 80–100)
MONOCYTES # BLD: 6 % (ref 1–7)
MORPHOLOGY: NORMAL
MYELOCYTES ABSOLUTE COUNT: 0.1 K/UL
MYELOCYTES: 1 %
NRBC AUTOMATED: ABNORMAL PER 100 WBC
PATHOLOGIST REVIEW: NORMAL
PDW BLD-RTO: 13.4 % (ref 11.5–14.9)
PLATELET # BLD: 222 K/UL (ref 150–450)
PLATELET ESTIMATE: ABNORMAL
PMV BLD AUTO: 8.6 FL (ref 6–12)
POTASSIUM SERPL-SCNC: 3.5 MMOL/L (ref 3.7–5.3)
RBC # BLD: 4.2 M/UL (ref 4.5–5.9)
RBC # BLD: ABNORMAL 10*6/UL
SEG NEUTROPHILS: 65 % (ref 36–66)
SEGMENTED NEUTROPHILS ABSOLUTE COUNT: 6.22 K/UL (ref 1.3–9.1)
SODIUM BLD-SCNC: 139 MMOL/L (ref 135–144)
SURGICAL PATHOLOGY REPORT: NORMAL
WBC # BLD: 9.6 K/UL (ref 3.5–11)
WBC # BLD: ABNORMAL 10*3/UL

## 2018-11-16 PROCEDURE — 85025 COMPLETE CBC W/AUTO DIFF WBC: CPT

## 2018-11-16 PROCEDURE — 6370000000 HC RX 637 (ALT 250 FOR IP): Performed by: INTERNAL MEDICINE

## 2018-11-16 PROCEDURE — 71046 X-RAY EXAM CHEST 2 VIEWS: CPT

## 2018-11-16 PROCEDURE — 2060000000 HC ICU INTERMEDIATE R&B

## 2018-11-16 PROCEDURE — 36415 COLL VENOUS BLD VENIPUNCTURE: CPT

## 2018-11-16 PROCEDURE — 80048 BASIC METABOLIC PNL TOTAL CA: CPT

## 2018-11-16 PROCEDURE — 6370000000 HC RX 637 (ALT 250 FOR IP): Performed by: FAMILY MEDICINE

## 2018-11-16 PROCEDURE — 6360000002 HC RX W HCPCS: Performed by: INTERNAL MEDICINE

## 2018-11-16 PROCEDURE — 94762 N-INVAS EAR/PLS OXIMTRY CONT: CPT

## 2018-11-16 PROCEDURE — 99232 SBSQ HOSP IP/OBS MODERATE 35: CPT | Performed by: INTERNAL MEDICINE

## 2018-11-16 RX ADMIN — FINASTERIDE 5 MG: 5 TABLET, FILM COATED ORAL at 08:42

## 2018-11-16 RX ADMIN — METOPROLOL TARTRATE 50 MG: 50 TABLET ORAL at 08:42

## 2018-11-16 RX ADMIN — FUROSEMIDE 40 MG: 10 INJECTION, SOLUTION INTRAMUSCULAR; INTRAVENOUS at 08:43

## 2018-11-16 RX ADMIN — METOPROLOL TARTRATE 50 MG: 50 TABLET ORAL at 20:58

## 2018-11-16 RX ADMIN — MOMETASONE FUROATE AND FORMOTEROL FUMARATE DIHYDRATE 2 PUFF: 200; 5 AEROSOL RESPIRATORY (INHALATION) at 06:02

## 2018-11-16 RX ADMIN — POTASSIUM CHLORIDE 20 MEQ: 1.5 POWDER, FOR SOLUTION ORAL at 08:42

## 2018-11-16 RX ADMIN — IPRATROPIUM BROMIDE 2 PUFF: 17 AEROSOL, METERED RESPIRATORY (INHALATION) at 21:02

## 2018-11-16 RX ADMIN — IPRATROPIUM BROMIDE 2 PUFF: 17 AEROSOL, METERED RESPIRATORY (INHALATION) at 08:42

## 2018-11-16 RX ADMIN — ASPIRIN 81 MG: 81 TABLET, COATED ORAL at 08:42

## 2018-11-16 RX ADMIN — TAMSULOSIN HYDROCHLORIDE 0.8 MG: 0.4 CAPSULE ORAL at 20:58

## 2018-11-16 RX ADMIN — POTASSIUM CHLORIDE 20 MEQ: 20 TABLET, EXTENDED RELEASE ORAL at 00:06

## 2018-11-16 RX ADMIN — ATORVASTATIN CALCIUM 40 MG: 40 TABLET, FILM COATED ORAL at 20:58

## 2018-11-16 ASSESSMENT — PAIN SCALES - GENERAL: PAINLEVEL_OUTOF10: 0

## 2018-11-16 NOTE — PROGRESS NOTES
Basophils # 0.10 0.0 - 0.2 k/uL    Absolute Bands # 0.19 0.0 - 1.0 k/uL    Myelocytes Absolute 0.10 (H) 0 k/uL    Morphology Normal    Basic Metabolic Panel    Collection Time: 18  5:29 AM   Result Value Ref Range    Glucose 107 (H) 70 - 99 mg/dL    BUN 18 8 - 23 mg/dL    CREATININE 0.83 0.70 - 1.20 mg/dL    Bun/Cre Ratio NOT REPORTED 9 - 20    Calcium 9.0 8.6 - 10.4 mg/dL    Sodium 139 135 - 144 mmol/L    Potassium 3.5 (L) 3.7 - 5.3 mmol/L    Chloride 95 (L) 98 - 107 mmol/L    CO2 32 (H) 20 - 31 mmol/L    Anion Gap 12 9 - 17 mmol/L    GFR Non-African American >60 >60 mL/min    GFR African American >60 >60 mL/min    GFR Comment          GFR Staging NOT REPORTED    Path Review, Smear    Collection Time: 18  5:29 AM   Result Value Ref Range    Pathologist Review TO BE REVIEWED BY PATHOLOGIST        Pulse Ox: SpO2  Av.3 %  Min: 92 %  Max: 95 %    Supplemental O2:       Current Meds:    pneumococcal 13-valent conjugate  0.5 mL Intramuscular Once    ipratropium  2 puff Inhalation 4x daily    furosemide  40 mg Intravenous Daily    metoprolol tartrate  50 mg Oral BID    aspirin  81 mg Oral Daily    atorvastatin  40 mg Oral Nightly    finasteride  5 mg Oral Daily    mometasone-formoterol  2 puff Inhalation Q12H    potassium chloride  20 mEq Oral Daily    tamsulosin  0.8 mg Oral Nightly       Continuous Infusions:    sodium chloride 20 mL/hr at 11/15/18 0421            VITAL SIGNS:    /78   Pulse 90   Temp 97.5 °F (36.4 °C) (Oral)   Resp 16   Ht 6' 2\" (1.88 m)   Wt 263 lb 0.1 oz (119.3 kg)   SpO2 92%   BMI 33.77 kg/m²         Admit Weight:  263 lb 0.1 oz (119.3 kg)    Last 3 weights: Wt Readings from Last 3 Encounters:   18 263 lb 0.1 oz (119.3 kg)   18 263 lb 0.1 oz (119.3 kg)   18 263 lb (119.3 kg)       BMI: Body mass index is 33.77 kg/m².      INPUT/OUTPUT:        Intake/Output Summary (Last 24 hours) at 18 1440  Last data filed at 18 1103   Gross

## 2018-11-16 NOTE — FLOWSHEET NOTE
Patient sleeping; [place] visit with wife, Siomara Li; wife tearful and overwhelmed as she shared patient's medical issues and concerns; waiting on medical testing and findings; listening presence and support provided; wife comforted by prayer; wife stated patient did not have AD documents and accepted an AD booklet on patient's behalf; wife stated they would advise with any further needs or questions in regard to AD documents;     11/16/18 1049   Encounter Summary   Services provided to: Patient and family together   Referral/Consult From: [facility]   Support System Spouse   Continue Visiting (11/16/18)   Complexity of Encounter Moderate   Length of Encounter 15 minutes   Spiritual Assessment Completed Yes   Routine   Type Initial   Spiritual/Temple   Type Spiritual support   Assessment Approachable;Tearful; Anxious; Hopeful;Coping;Helplessness   Intervention Active listening;Explored feelings, thoughts, concerns;Prayer;Nurtured hope;Sustaining presence/ Ministry of presence; Discussed illness/injury and it's impact   Outcome Comfort;Expressed gratitude;Engaged in conversation;Expressed feelings/needs/concerns;Coping; Hopeful;Receptive   Advance Directives (For Healthcare)   Healthcare Directive No, patient does not have an advance directive for healthcare treatment   Advance Directives Documents given

## 2018-11-17 LAB
ANION GAP SERPL CALCULATED.3IONS-SCNC: 10 MMOL/L (ref 9–17)
BNP INTERPRETATION: ABNORMAL
BUN BLDV-MCNC: 20 MG/DL (ref 8–23)
BUN/CREAT BLD: ABNORMAL (ref 9–20)
CALCIUM SERPL-MCNC: 9.2 MG/DL (ref 8.6–10.4)
CHLORIDE BLD-SCNC: 95 MMOL/L (ref 98–107)
CO2: 35 MMOL/L (ref 20–31)
CREAT SERPL-MCNC: 0.91 MG/DL (ref 0.7–1.2)
DIRECT EXAM: NORMAL
GFR AFRICAN AMERICAN: >60 ML/MIN
GFR NON-AFRICAN AMERICAN: >60 ML/MIN
GFR SERPL CREATININE-BSD FRML MDRD: ABNORMAL ML/MIN/{1.73_M2}
GFR SERPL CREATININE-BSD FRML MDRD: ABNORMAL ML/MIN/{1.73_M2}
GLUCOSE BLD-MCNC: 152 MG/DL (ref 70–99)
Lab: NORMAL
POTASSIUM SERPL-SCNC: 3.6 MMOL/L (ref 3.7–5.3)
PRO-BNP: 1031 PG/ML
SODIUM BLD-SCNC: 140 MMOL/L (ref 135–144)
SPECIMEN DESCRIPTION: NORMAL
STATUS: NORMAL

## 2018-11-17 PROCEDURE — 6370000000 HC RX 637 (ALT 250 FOR IP): Performed by: INTERNAL MEDICINE

## 2018-11-17 PROCEDURE — 6360000002 HC RX W HCPCS: Performed by: FAMILY MEDICINE

## 2018-11-17 PROCEDURE — 80048 BASIC METABOLIC PNL TOTAL CA: CPT

## 2018-11-17 PROCEDURE — 2060000000 HC ICU INTERMEDIATE R&B

## 2018-11-17 PROCEDURE — 36415 COLL VENOUS BLD VENIPUNCTURE: CPT

## 2018-11-17 PROCEDURE — 99232 SBSQ HOSP IP/OBS MODERATE 35: CPT | Performed by: INTERNAL MEDICINE

## 2018-11-17 PROCEDURE — 83880 ASSAY OF NATRIURETIC PEPTIDE: CPT

## 2018-11-17 PROCEDURE — 6360000002 HC RX W HCPCS: Performed by: INTERNAL MEDICINE

## 2018-11-17 PROCEDURE — 6370000000 HC RX 637 (ALT 250 FOR IP): Performed by: FAMILY MEDICINE

## 2018-11-17 RX ORDER — LANOLIN ALCOHOL/MO/W.PET/CERES
CREAM (GRAM) TOPICAL 2 TIMES DAILY
Status: DISCONTINUED | OUTPATIENT
Start: 2018-11-17 | End: 2018-11-19 | Stop reason: HOSPADM

## 2018-11-17 RX ORDER — POTASSIUM CHLORIDE 1.5 G/1.77G
20 POWDER, FOR SOLUTION ORAL 2 TIMES DAILY
Status: DISCONTINUED | OUTPATIENT
Start: 2018-11-17 | End: 2018-11-19 | Stop reason: HOSPADM

## 2018-11-17 RX ORDER — FUROSEMIDE 10 MG/ML
40 INJECTION INTRAMUSCULAR; INTRAVENOUS 2 TIMES DAILY
Status: DISCONTINUED | OUTPATIENT
Start: 2018-11-17 | End: 2018-11-19 | Stop reason: HOSPADM

## 2018-11-17 RX ADMIN — POTASSIUM CHLORIDE 20 MEQ: 1.5 POWDER, FOR SOLUTION ORAL at 21:58

## 2018-11-17 RX ADMIN — Medication: at 21:58

## 2018-11-17 RX ADMIN — METOPROLOL TARTRATE 50 MG: 50 TABLET ORAL at 21:58

## 2018-11-17 RX ADMIN — POTASSIUM CHLORIDE 20 MEQ: 1.5 POWDER, FOR SOLUTION ORAL at 08:18

## 2018-11-17 RX ADMIN — FUROSEMIDE 40 MG: 10 INJECTION, SOLUTION INTRAMUSCULAR; INTRAVENOUS at 08:19

## 2018-11-17 RX ADMIN — TAMSULOSIN HYDROCHLORIDE 0.8 MG: 0.4 CAPSULE ORAL at 21:57

## 2018-11-17 RX ADMIN — ASPIRIN 81 MG: 81 TABLET, COATED ORAL at 08:18

## 2018-11-17 RX ADMIN — FUROSEMIDE 40 MG: 10 INJECTION, SOLUTION INTRAMUSCULAR; INTRAVENOUS at 18:09

## 2018-11-17 RX ADMIN — IPRATROPIUM BROMIDE 2 PUFF: 17 AEROSOL, METERED RESPIRATORY (INHALATION) at 18:02

## 2018-11-17 RX ADMIN — Medication: at 12:59

## 2018-11-17 RX ADMIN — FINASTERIDE 5 MG: 5 TABLET, FILM COATED ORAL at 08:18

## 2018-11-17 RX ADMIN — ATORVASTATIN CALCIUM 40 MG: 40 TABLET, FILM COATED ORAL at 21:57

## 2018-11-17 RX ADMIN — MOMETASONE FUROATE AND FORMOTEROL FUMARATE DIHYDRATE 2 PUFF: 200; 5 AEROSOL RESPIRATORY (INHALATION) at 08:19

## 2018-11-17 RX ADMIN — IPRATROPIUM BROMIDE 2 PUFF: 17 AEROSOL, METERED RESPIRATORY (INHALATION) at 12:51

## 2018-11-17 RX ADMIN — METOPROLOL TARTRATE 50 MG: 50 TABLET ORAL at 08:22

## 2018-11-17 RX ADMIN — MOMETASONE FUROATE AND FORMOTEROL FUMARATE DIHYDRATE 2 PUFF: 200; 5 AEROSOL RESPIRATORY (INHALATION) at 19:43

## 2018-11-17 ASSESSMENT — PAIN SCALES - GENERAL: PAINLEVEL_OUTOF10: 0

## 2018-11-17 NOTE — PROGRESS NOTES
[facility] CARDIOLOGY Progress Note    2018 9:03 AM      Subjective:  Mr. Howie Devries  denies any chest pain or shortness of breath or palpitations or lightheadedness. LABS:     No results found for this or any previous visit (from the past 24 hour(s)). Pulse Ox: SpO2  Av.3 %  Min: 90 %  Max: 93 %    Supplemental O2:       Current Meds:    pneumococcal 13-valent conjugate  0.5 mL Intramuscular Once    ipratropium  2 puff Inhalation 4x daily    furosemide  40 mg Intravenous Daily    metoprolol tartrate  50 mg Oral BID    aspirin  81 mg Oral Daily    atorvastatin  40 mg Oral Nightly    finasteride  5 mg Oral Daily    mometasone-formoterol  2 puff Inhalation Q12H    potassium chloride  20 mEq Oral Daily    tamsulosin  0.8 mg Oral Nightly       Continuous Infusions:    sodium chloride 20 mL/hr at 11/15/18 0421            VITAL SIGNS:    /63   Pulse 86   Temp 97.7 °F (36.5 °C) (Oral)   Resp 20   Ht 6' 2\" (1.88 m)   Wt 263 lb 0.1 oz (119.3 kg)   SpO2 91%   BMI 33.77 kg/m²         Admit Weight:  263 lb 0.1 oz (119.3 kg)    Last 3 weights: Wt Readings from Last 3 Encounters:   18 263 lb 0.1 oz (119.3 kg)   18 263 lb 0.1 oz (119.3 kg)   18 263 lb (119.3 kg)       BMI: Body mass index is 33.77 kg/m². INPUT/OUTPUT:        Intake/Output Summary (Last 24 hours) at 18 0903  Last data filed at 18 0533   Gross per 24 hour   Intake                0 ml   Output              750 ml   Net             -750 ml       Telemetry shows A fib. EXAM:     General appearance: awake, alert. Ate breakfast.  Neck: No JVD. Chest: clearer except at bases bilaterally. No rhonchi or wheezing. Cardiac: Irregular rate and rhythm. No U0wjhryi or rubs. Abdomen: soft, non-tender. Extremities: no cyanosis, no clubbing, no calf tenderness, + leg edema. Neuro:   Able to move all 4 extremities.     ASSESSMENT/PLAN:    Chronic atrial fibrillation, nonsustained ventricular tachycardia -with no recurrence, normal LVEF, biatrial enlargement, mild MR, mild LVH, mild pulmonary hypertension, no ischemia on nuclear stress test in June 2018, Shortness of breath, Status post left thoracentesis with 1500 mL drained on November 14, 2018, Metastatic urothelial carcinoma, other problems as charted. Improving. Discussed with Dr. Zak Horvath who is going to start on IV Lasix and potassium supplement and suggest maintaining potassium level greater than 4.0. Plan to repeat chest x-ray. Continue on aspirin, Lipitor, metoprolol. Okay to discharge to home any time from cardiac standpoint on all current cardiac medications. No plans for any further cardiac workup as inpatient. Discussed with wife at bedside. Will follow. Electronically signed by Josue Trujillo MD, [address]        PLEASE NOTE:  This progress note was completed using a voice transcription system. Every effort was made to ensure accuracy. However, inadvertent computerized transcription errors may be present.

## 2018-11-17 NOTE — PROGRESS NOTES
and rhythm   Abdomen - Soft, nontender, nondistended, no masses or organomegaly  Neurologic - There are no focal motor or sensory deficits  Skin - No bruising or bleeding  Extremities - No clubbing, cyanosis, edema    MEDS      furosemide  40 mg Intravenous BID    potassium chloride  20 mEq Oral BID    HYDROCERIN   Topical BID    pneumococcal 13-valent conjugate  0.5 mL Intramuscular Once    ipratropium  2 puff Inhalation 4x daily    metoprolol tartrate  50 mg Oral BID    aspirin  81 mg Oral Daily    atorvastatin  40 mg Oral Nightly    finasteride  5 mg Oral Daily    mometasone-formoterol  2 puff Inhalation Q12H    tamsulosin  0.8 mg Oral Nightly      sodium chloride 20 mL/hr at 11/15/18 0421     levalbuterol, magnesium hydroxide, acetaminophen    LABS   CBC   Recent Labs      11/16/18   0529   WBC  9.6   HGB  13.3*   HCT  40.3*   MCV  95.9   PLT  222     BMP: Lab Results   Component Value Date     11/17/2018    K 3.6 11/17/2018    CL 95 11/17/2018    CO2 35 11/17/2018    BUN 20 11/17/2018    LABALBU 4.0 10/25/2018    LABALBU 4.5 10/13/2011    CREATININE 0.91 11/17/2018    CALCIUM 9.2 11/17/2018    GFRAA >60 11/17/2018    LABGLOM >60 11/17/2018     ABGs:No results found for: PHART, PO2ART, [id number] No results found for: IFIO2, MODE, SETTIDVOL, SETPEEP  Ionized Calcium:  No results found for: IONCA  Magnesium:    Lab Results   Component Value Date    MG 1.9 11/15/2018     Phosphorus:  No results found for: PHOS     LIVER PROFILE No results for input(s): AST, ALT, LIPASE, BILIDIR, BILITOT, ALKPHOS in the last 72 hours. Invalid input(s): AMYLASE,  ALB  INR   Recent Labs      11/14/18   1533   INR  1.1     PTT   Lab Results   Component Value Date    APTT 30.0 11/14/2018         RADIOLOGY     (See actual reports for details)    ASSESSMENT/PLAN   Active Problems:    Pleural effusion  Resolved Problems:    * No resolved hospital problems.  *  COPD  Transitional renal cell carcinoma  Lung

## 2018-11-17 NOTE — PROGRESS NOTES
REMOVAL STENT / STONE (Right, 9/14/2018). Medications:    Prior to Admission medications    Medication Sig Start Date End Date Taking? Authorizing Provider   metoprolol tartrate (LOPRESSOR) 50 MG tablet Take 1 tablet by mouth daily 10/29/18   Melchor T Erich, DO   aspirin 81 MG tablet Take 81 mg by mouth daily    Historical Provider, MD   magnesium hydroxide (MILK OF MAGNESIA) 400 MG/5ML suspension Take by mouth daily as needed for Constipation    Historical Provider, MD   POTASSIUM CHLORIDE PO Take 20 mEq by mouth daily    Historical Provider, MD   furosemide (LASIX) 20 MG tablet Take 20 mg by mouth 2 times daily    Historical Provider, MD   albuterol sulfate  (90 Base) MCG/ACT inhaler Inhale 2 puffs into the lungs 4 times daily as needed for Wheezing    Historical Provider, MD   mometasone-formoterol (DULERA) 200-5 MCG/ACT inhaler Inhale 2 puffs into the lungs every 12 hours    Historical Provider, MD   finasteride (PROSCAR) 5 MG tablet Take 1 tablet by mouth daily 4/19/18   Selene Willson MD   atorvastatin (LIPITOR) 40 MG tablet Take 40 mg by mouth nightly.     Historical Provider, MD   tamsulosin (FLOMAX) 0.4 MG capsule Take 0.8 mg by mouth nightly Takes 2 tablets to equal 0.8 mg    Historical Provider, MD     Current Facility-Administered Medications   Medication Dose Route Frequency Provider Last Rate Last Dose    pneumococcal 13-valent conjugate (PREVNAR) injection 0.5 mL  0.5 mL Intramuscular Once Melchor T Erich, DO        levalbuterol (XOPENEX) nebulization 0.63 mg  0.63 mg Nebulization Q4H PRN Melchor T Erich, DO        ipratropium (ATROVENT HFA) 17 MCG/ACT inhaler 2 puff  2 puff Inhalation 4x daily Melchor T Erich, DO   2 puff at 11/16/18 2102    furosemide (LASIX) injection 40 mg  40 mg Intravenous Daily Rosy Mccallum MD   40 mg at 11/16/18 0843    metoprolol tartrate (LOPRESSOR) tablet 50 mg  50 mg Oral BID Rosy Mccallum MD   50 mg at 11/16/18 2058    aspirin EC tablet 81 mg  81 mg Oral Daily Talisha Fortis, DO   81 mg at 11/16/18 0842    atorvastatin (LIPITOR) tablet 40 mg  40 mg Oral Nightly Melchor T Erich, DO   40 mg at 11/16/18 2058    finasteride (PROSCAR) tablet 5 mg  5 mg Oral Daily Melchor T Erich, DO   5 mg at 11/16/18 0842    magnesium hydroxide (MILK OF MAGNESIA) 400 MG/5ML suspension 5 mL  5 mL Oral Daily PRN Melchor T Erich, DO        mometasone-formoterol (DULERA) 200-5 MCG/ACT inhaler 2 puff  2 puff Inhalation Q12H Melchor T Erich, DO   2 puff at 11/16/18 0602    potassium chloride (KLOR-CON) packet 20 mEq  20 mEq Oral Daily Melchor T Erich, DO   20 mEq at 11/16/18 0842    tamsulosin (FLOMAX) capsule 0.8 mg  0.8 mg Oral Nightly Melchor T Erich, DO   0.8 mg at 11/16/18 2058    0.9 % sodium chloride infusion   Intravenous Continuous Елена Piedra MD 20 mL/hr at 11/15/18 0421      acetaminophen (TYLENOL) tablet 650 mg  650 mg Oral Q4H PRN Елена Piedra MD           Allergies:  Oxycodone hcl and Other    Social History:   reports that he quit smoking about 19 months ago. He started smoking about 70 years ago. He has a 30.00 pack-year smoking history. He has never used smokeless tobacco. He reports that he does not drink alcohol or use drugs. Family History: family history includes Breast Cancer in his sister; Colon Cancer in his brother; Diabetes in his mother; No Known Problems in his maternal grandfather, maternal grandmother, paternal grandfather, paternal grandmother, and sister; Other in his father. REVIEW OF SYSTEMS:    Constitutional: No fever or chills.  No night sweats, no weight loss   Eyes: No eye discharge, double vision, or eye pain   HEENT: negative for sore mouth, sore throat, hoarseness and voice change   Respiratory: negative for cough , sputum, _-dyspnea, wheezing, hemoptysis, chest pain   Cardiovascular: negative for chest pain, ++dyspnea, palpitations, orthopnea, PND   Gastrointestinal: negative for nausea, vomiting, diarrhea, constipation, abdominal pain, 11/16/18   0529   NA  140  139   K  3.3*  3.5*   CO2  34*  32*   BUN  18  18   CREATININE  1.03  0.83   LABGLOM  >60  >60   GLUCOSE  114*  107*     PT/INR:   Recent Labs      11/14/18   1533   PROTIME  11.9   INR  1.1   Final Diagnosis      RIGHT RENAL PELVIS, BIOPSY:         HIGH GRADE UROTHELIAL CARCINOMA (SEE COMMENT)     Diagnosis Comment   Within the observed sections, there is no muscularis propria   identified.  There are no definitive features of invasion identified   within this limited biopsy.  Please correlate with appropriate   clinical findings.  Findings discussed with  [facility], [address] on   September 19, 2018. IMAGING DATA:  CT chest  Multiple lung nodules concerning for metastatic disease       Right renal mass.  This is concerning for neoplasm.       Left pleural effusion with associated atelectasis. Primary Problem  <principal problem not specified>    Active Hospital Problems    Diagnosis Date Noted    Pleural effusion [J90] 10/25/2018         IMPRESSION:   1. Metastatic urothelial carcinoma: Based on the CT findings this is most c/w metastatic disease from his urothelial cancer. He is not a candidate for surgery as this is advanced disease and would need systemic treatment. Given his age he is not a candidate for cisplatin so will plan immunotherapy with atezolizumab. Will assess response after 3-4 cycles and then reconsider cytoreductive nephrectomy  2. Left pleural effusion: had thoracentesis   3. SOB: getting better  4. hematuria    RECOMMENDATIONS:  1. Pleural fluid cytology negative for malignancy. Still highly concerning for malignant effusion   2. Patient is cisplatin ineligible. Anticipating to start immunotherapy   3. Discussed the natural history of urothelial cancer. 4. Okay to discharge from oncology standpoint once medically stable  5. Plan to start immunotherapy as o/p  6. Treatment is scheduled on 11/27  7. NO acut oncologic intervention planned as inpatient  8.  Will

## 2018-11-18 ENCOUNTER — APPOINTMENT (OUTPATIENT)
Dept: GENERAL RADIOLOGY | Age: 78
DRG: 181 | End: 2018-11-18
Attending: FAMILY MEDICINE
Payer: MEDICARE

## 2018-11-18 LAB
ABSOLUTE BANDS #: 0.24 K/UL (ref 0–1)
ABSOLUTE EOS #: 0.12 K/UL (ref 0–0.4)
ABSOLUTE IMMATURE GRANULOCYTE: ABNORMAL K/UL (ref 0–0.3)
ABSOLUTE LYMPH #: 0.72 K/UL (ref 1–4.8)
ABSOLUTE MONO #: 1.56 K/UL (ref 0.1–1.3)
ANION GAP SERPL CALCULATED.3IONS-SCNC: 13 MMOL/L (ref 9–17)
ATYPICAL LYMPHOCYTE ABSOLUTE COUNT: 0.84 K/UL
ATYPICAL LYMPHOCYTES: 7 %
BANDS: 2 % (ref 0–10)
BASOPHILS # BLD: 0 % (ref 0–2)
BASOPHILS ABSOLUTE: 0 K/UL (ref 0–0.2)
BUN BLDV-MCNC: 20 MG/DL (ref 8–23)
BUN/CREAT BLD: ABNORMAL (ref 9–20)
CALCIUM SERPL-MCNC: 9.1 MG/DL (ref 8.6–10.4)
CHLORIDE BLD-SCNC: 92 MMOL/L (ref 98–107)
CO2: 31 MMOL/L (ref 20–31)
CREAT SERPL-MCNC: 0.89 MG/DL (ref 0.7–1.2)
DIFFERENTIAL TYPE: ABNORMAL
EOSINOPHILS RELATIVE PERCENT: 1 % (ref 0–4)
GFR AFRICAN AMERICAN: >60 ML/MIN
GFR NON-AFRICAN AMERICAN: >60 ML/MIN
GFR SERPL CREATININE-BSD FRML MDRD: ABNORMAL ML/MIN/{1.73_M2}
GFR SERPL CREATININE-BSD FRML MDRD: ABNORMAL ML/MIN/{1.73_M2}
GLUCOSE BLD-MCNC: 109 MG/DL (ref 70–99)
HCT VFR BLD CALC: 40.6 % (ref 41–53)
HEMOGLOBIN: 13.5 G/DL (ref 13.5–17.5)
IMMATURE GRANULOCYTES: ABNORMAL %
INR BLD: 1.1
LYMPHOCYTES # BLD: 6 % (ref 24–44)
MCH RBC QN AUTO: 31.5 PG (ref 26–34)
MCHC RBC AUTO-ENTMCNC: 33.2 G/DL (ref 31–37)
MCV RBC AUTO: 95.1 FL (ref 80–100)
MONOCYTES # BLD: 13 % (ref 1–7)
MORPHOLOGY: NORMAL
NRBC AUTOMATED: ABNORMAL PER 100 WBC
PDW BLD-RTO: 13.3 % (ref 11.5–14.9)
PLATELET # BLD: 235 K/UL (ref 150–450)
PLATELET ESTIMATE: ABNORMAL
PMV BLD AUTO: 9.2 FL (ref 6–12)
POTASSIUM SERPL-SCNC: 3.6 MMOL/L (ref 3.7–5.3)
PROTHROMBIN TIME: 11.8 SEC (ref 9.7–12)
RBC # BLD: 4.27 M/UL (ref 4.5–5.9)
RBC # BLD: ABNORMAL 10*6/UL
SEG NEUTROPHILS: 71 % (ref 36–66)
SEGMENTED NEUTROPHILS ABSOLUTE COUNT: 8.52 K/UL (ref 1.3–9.1)
SODIUM BLD-SCNC: 136 MMOL/L (ref 135–144)
WBC # BLD: 12 K/UL (ref 3.5–11)
WBC # BLD: ABNORMAL 10*3/UL

## 2018-11-18 PROCEDURE — 94761 N-INVAS EAR/PLS OXIMETRY MLT: CPT

## 2018-11-18 PROCEDURE — 71046 X-RAY EXAM CHEST 2 VIEWS: CPT

## 2018-11-18 PROCEDURE — 6360000002 HC RX W HCPCS: Performed by: FAMILY MEDICINE

## 2018-11-18 PROCEDURE — 85610 PROTHROMBIN TIME: CPT

## 2018-11-18 PROCEDURE — 80048 BASIC METABOLIC PNL TOTAL CA: CPT

## 2018-11-18 PROCEDURE — 36415 COLL VENOUS BLD VENIPUNCTURE: CPT

## 2018-11-18 PROCEDURE — 2060000000 HC ICU INTERMEDIATE R&B

## 2018-11-18 PROCEDURE — 6370000000 HC RX 637 (ALT 250 FOR IP): Performed by: FAMILY MEDICINE

## 2018-11-18 PROCEDURE — 85025 COMPLETE CBC W/AUTO DIFF WBC: CPT

## 2018-11-18 PROCEDURE — 6370000000 HC RX 637 (ALT 250 FOR IP): Performed by: INTERNAL MEDICINE

## 2018-11-18 PROCEDURE — 99232 SBSQ HOSP IP/OBS MODERATE 35: CPT | Performed by: INTERNAL MEDICINE

## 2018-11-18 PROCEDURE — 94640 AIRWAY INHALATION TREATMENT: CPT

## 2018-11-18 RX ORDER — LEVALBUTEROL INHALATION SOLUTION 0.63 MG/3ML
0.63 SOLUTION RESPIRATORY (INHALATION) EVERY 6 HOURS PRN
Status: DISCONTINUED | OUTPATIENT
Start: 2018-11-18 | End: 2018-11-19 | Stop reason: HOSPADM

## 2018-11-18 RX ORDER — POTASSIUM CHLORIDE 20 MEQ/1
20 TABLET, EXTENDED RELEASE ORAL ONCE
Status: DISCONTINUED | OUTPATIENT
Start: 2018-11-18 | End: 2018-11-18

## 2018-11-18 RX ORDER — POTASSIUM CHLORIDE 1.5 G/1.77G
20 POWDER, FOR SOLUTION ORAL ONCE
Status: COMPLETED | OUTPATIENT
Start: 2018-11-18 | End: 2018-11-18

## 2018-11-18 RX ADMIN — ATORVASTATIN CALCIUM 40 MG: 40 TABLET, FILM COATED ORAL at 21:24

## 2018-11-18 RX ADMIN — FINASTERIDE 5 MG: 5 TABLET, FILM COATED ORAL at 09:23

## 2018-11-18 RX ADMIN — IPRATROPIUM BROMIDE 2 PUFF: 17 AEROSOL, METERED RESPIRATORY (INHALATION) at 21:24

## 2018-11-18 RX ADMIN — POTASSIUM CHLORIDE 20 MEQ: 1.5 POWDER, FOR SOLUTION ORAL at 09:23

## 2018-11-18 RX ADMIN — FUROSEMIDE 40 MG: 10 INJECTION, SOLUTION INTRAMUSCULAR; INTRAVENOUS at 18:31

## 2018-11-18 RX ADMIN — METOPROLOL TARTRATE 50 MG: 50 TABLET ORAL at 09:23

## 2018-11-18 RX ADMIN — Medication: at 21:37

## 2018-11-18 RX ADMIN — ASPIRIN 81 MG: 81 TABLET, COATED ORAL at 09:23

## 2018-11-18 RX ADMIN — MOMETASONE FUROATE AND FORMOTEROL FUMARATE DIHYDRATE 2 PUFF: 200; 5 AEROSOL RESPIRATORY (INHALATION) at 21:24

## 2018-11-18 RX ADMIN — MOMETASONE FUROATE AND FORMOTEROL FUMARATE DIHYDRATE 2 PUFF: 200; 5 AEROSOL RESPIRATORY (INHALATION) at 09:23

## 2018-11-18 RX ADMIN — POTASSIUM CHLORIDE 20 MEQ: 1.5 POWDER, FOR SOLUTION ORAL at 21:24

## 2018-11-18 RX ADMIN — IPRATROPIUM BROMIDE 2 PUFF: 17 AEROSOL, METERED RESPIRATORY (INHALATION) at 09:24

## 2018-11-18 RX ADMIN — FUROSEMIDE 40 MG: 10 INJECTION, SOLUTION INTRAMUSCULAR; INTRAVENOUS at 09:23

## 2018-11-18 RX ADMIN — METOPROLOL TARTRATE 50 MG: 50 TABLET ORAL at 21:24

## 2018-11-18 RX ADMIN — LEVALBUTEROL HYDROCHLORIDE 0.63 MG: 0.63 SOLUTION RESPIRATORY (INHALATION) at 08:51

## 2018-11-18 RX ADMIN — POTASSIUM CHLORIDE 20 MEQ: 1.5 POWDER, FOR SOLUTION ORAL at 15:08

## 2018-11-18 RX ADMIN — TAMSULOSIN HYDROCHLORIDE 0.8 MG: 0.4 CAPSULE ORAL at 21:24

## 2018-11-18 NOTE — CARE COORDINATION
DISCHARGE PLANNING NOTE:    Plan is for this patient to return to home and will have services resumed with [facility]. Should discharge to home tomorrow.      Electronically signed by Jmaes Munoz RN on 11/18/2018 at 11:42 AM

## 2018-11-18 NOTE — PROGRESS NOTES
alert.  Chest: clearer bilaterally. No tenderness. No rhonchi or wheezing. Cardiac:   Irregularly irregular rate and rhythm with no S3 gallop. Extremities: no leg edema. ASSESSMENT/PLAN:    Chronic atrial fibrillation, transient single episode of 7 beatnonsustained ventricular tachycardia as charted previously with no recurrence since, normal LVEF, biatrial enlargement, mild MR, mild LVH, mild pulmonary hypertension, no ischemia on nuclear stress test in June 2018, Shortness of breath, Status post left thoracentesis with 1500 mL drained on November 14, 2018, Metastatic urothelial carcinoma, other problems as charted. Improved clinically. Okay to discharge to home on all current cardiac medications. Discussed with patient's wife and son Jerel Cole at bedside. Follow-up in our office in 2 weeks as advised. Signing off. Thanks. Electronically signed by Dhaval Farley MD, [facility]        PLEASE NOTE:  This progress note was completed using a voice transcription system. Every effort was made to ensure accuracy. However, inadvertent computerized transcription errors may be present.

## 2018-11-19 ENCOUNTER — TELEPHONE (OUTPATIENT)
Dept: INFUSION THERAPY | Age: 78
End: 2018-11-19

## 2018-11-19 ENCOUNTER — APPOINTMENT (OUTPATIENT)
Dept: GENERAL RADIOLOGY | Age: 78
DRG: 181 | End: 2018-11-19
Attending: FAMILY MEDICINE
Payer: MEDICARE

## 2018-11-19 VITALS
BODY MASS INDEX: 33.75 KG/M2 | DIASTOLIC BLOOD PRESSURE: 72 MMHG | OXYGEN SATURATION: 93 % | SYSTOLIC BLOOD PRESSURE: 100 MMHG | HEIGHT: 74 IN | RESPIRATION RATE: 16 BRPM | HEART RATE: 69 BPM | WEIGHT: 263.01 LBS | TEMPERATURE: 98 F

## 2018-11-19 LAB
ANION GAP SERPL CALCULATED.3IONS-SCNC: 10 MMOL/L (ref 9–17)
BUN BLDV-MCNC: 18 MG/DL (ref 8–23)
BUN/CREAT BLD: ABNORMAL (ref 9–20)
CALCIUM SERPL-MCNC: 8.9 MG/DL (ref 8.6–10.4)
CHLORIDE BLD-SCNC: 93 MMOL/L (ref 98–107)
CO2: 35 MMOL/L (ref 20–31)
CREAT SERPL-MCNC: 1.01 MG/DL (ref 0.7–1.2)
GFR AFRICAN AMERICAN: >60 ML/MIN
GFR NON-AFRICAN AMERICAN: >60 ML/MIN
GFR SERPL CREATININE-BSD FRML MDRD: ABNORMAL ML/MIN/{1.73_M2}
GFR SERPL CREATININE-BSD FRML MDRD: ABNORMAL ML/MIN/{1.73_M2}
GLUCOSE BLD-MCNC: 130 MG/DL (ref 70–99)
POTASSIUM SERPL-SCNC: 3.7 MMOL/L (ref 3.7–5.3)
SODIUM BLD-SCNC: 138 MMOL/L (ref 135–144)

## 2018-11-19 PROCEDURE — 80048 BASIC METABOLIC PNL TOTAL CA: CPT

## 2018-11-19 PROCEDURE — 36415 COLL VENOUS BLD VENIPUNCTURE: CPT

## 2018-11-19 PROCEDURE — G0009 ADMIN PNEUMOCOCCAL VACCINE: HCPCS | Performed by: FAMILY MEDICINE

## 2018-11-19 PROCEDURE — 71046 X-RAY EXAM CHEST 2 VIEWS: CPT

## 2018-11-19 PROCEDURE — 6370000000 HC RX 637 (ALT 250 FOR IP): Performed by: INTERNAL MEDICINE

## 2018-11-19 PROCEDURE — 6370000000 HC RX 637 (ALT 250 FOR IP): Performed by: FAMILY MEDICINE

## 2018-11-19 PROCEDURE — 6360000002 HC RX W HCPCS: Performed by: FAMILY MEDICINE

## 2018-11-19 PROCEDURE — 90670 PCV13 VACCINE IM: CPT | Performed by: FAMILY MEDICINE

## 2018-11-19 RX ORDER — IPRATROPIUM BROMIDE AND ALBUTEROL SULFATE 2.5; .5 MG/3ML; MG/3ML
1 SOLUTION RESPIRATORY (INHALATION) 4 TIMES DAILY
Qty: 360 ML | Refills: 0 | Status: SHIPPED | OUTPATIENT
Start: 2018-11-19 | End: 2019-02-19

## 2018-11-19 RX ORDER — FUROSEMIDE 20 MG/1
40 TABLET ORAL 2 TIMES DAILY
Qty: 60 TABLET | Refills: 3 | Status: SHIPPED | OUTPATIENT
Start: 2018-11-19

## 2018-11-19 RX ADMIN — FUROSEMIDE 40 MG: 10 INJECTION, SOLUTION INTRAMUSCULAR; INTRAVENOUS at 17:01

## 2018-11-19 RX ADMIN — FUROSEMIDE 40 MG: 10 INJECTION, SOLUTION INTRAMUSCULAR; INTRAVENOUS at 09:01

## 2018-11-19 RX ADMIN — IPRATROPIUM BROMIDE 2 PUFF: 17 AEROSOL, METERED RESPIRATORY (INHALATION) at 12:22

## 2018-11-19 RX ADMIN — FINASTERIDE 5 MG: 5 TABLET, FILM COATED ORAL at 09:01

## 2018-11-19 RX ADMIN — Medication: at 09:02

## 2018-11-19 RX ADMIN — PNEUMOCOCCAL 13-VALENT CONJUGATE VACCINE 0.5 ML: 2.2; 2.2; 2.2; 2.2; 2.2; 4.4; 2.2; 2.2; 2.2; 2.2; 2.2; 2.2; 2.2 INJECTION, SUSPENSION INTRAMUSCULAR at 17:02

## 2018-11-19 RX ADMIN — POTASSIUM CHLORIDE 20 MEQ: 1.5 POWDER, FOR SOLUTION ORAL at 09:00

## 2018-11-19 RX ADMIN — METOPROLOL TARTRATE 50 MG: 50 TABLET ORAL at 09:01

## 2018-11-19 RX ADMIN — MOMETASONE FUROATE AND FORMOTEROL FUMARATE DIHYDRATE 2 PUFF: 200; 5 AEROSOL RESPIRATORY (INHALATION) at 09:01

## 2018-11-19 RX ADMIN — IPRATROPIUM BROMIDE 2 PUFF: 17 AEROSOL, METERED RESPIRATORY (INHALATION) at 09:01

## 2018-11-19 RX ADMIN — ASPIRIN 81 MG: 81 TABLET, COATED ORAL at 09:01

## 2018-11-19 NOTE — TELEPHONE ENCOUNTER
Chemo orders received, 72\" ht, 263lb wt, and 2.45 bsa verified.    Dose of chemotherapy verified  Tecentriq 1200 mg flat dose  21 day cycle  4 cycles

## 2018-11-19 NOTE — CARE COORDINATION
ONGOING DISCHARGE PLAN:    Spoke with patient & Patient's Wife, regarding discharge plan and they confirms that plan is still to DC home w/ [facility] & [facility]. Pt's Wife states, she is already working w/ Dr. Bianka Juares office to get Nebulizer. Denies assistance. Anticipate DC home this afternoon after CXR. Will continue to follow for additional discharge needs.     Electronically signed by Miya Sanchez RN on 11/19/2018 at 8:46 AM

## 2018-11-19 NOTE — PROGRESS NOTES
Pulmonary Progress Note  [facility]      Patient - Kate Antonio,  Age - [de-identified] y.o.    - 1940      Room Number - 2111/2111-01   N -  [id number]   Acct # - [de-identified]  Date of Admission -  2018  2:27 PM        Consulting  [place] DO Mary Anne  Primary Care Physician - Virginia Patterson DO     SUBJECTIVE   He feels better, Less dyspneic    OBJECTIVE   VITALS    height is 6' 2\" (1.88 m) and weight is 263 lb 0.1 oz (119.3 kg). His oral temperature is 98 °F (36.7 °C). His blood pressure is 100/72 and his pulse is 69. His respiration is 16 and oxygen saturation is 93%. Body mass index is 33.77 kg/m². Temperature Range: Temp: 98 °F (36.7 °C) Temp  Av.1 °F (36.7 °C)  Min: 97.7 °F (36.5 °C)  Max: 98.5 °F (36.9 °C)  BP Range:  Systolic (64ZKI), YJZ:062 , Min:100 , DXC:648     Diastolic (05PAO), TJW:53, Min:56, Max:77    Pulse Range: Pulse  Av  Min: 69  Max: 82  Respiration Range: Resp  Av.3  Min: 16  Max: 17  Current Pulse Ox[de-identified]  SpO2: 93 %  24HR Pulse Ox Range:  SpO2  Av.3 %  Min: 93 %  Max: 94 %  Oxygen Amount and Delivery: Wt Readings from Last 3 Encounters:   18 263 lb 0.1 oz (119.3 kg)   18 263 lb 0.1 oz (119.3 kg)   18 263 lb (119.3 kg)       I/O (24 Hours)    Intake/Output Summary (Last 24 hours) at 18 1621  Last data filed at 18 0215   Gross per 24 hour   Intake                0 ml   Output              650 ml   Net             -650 ml       EXAM     General Appearance  Awake, alert, oriented, in no acute distress  HEENT - normocephalic, atraumatic.  []  Mallampati  [] Crowded airway   [] Macroglossia  []  Retrognathia  [] Micrognathia  []  Normal tongue size []  Normal Bite  [] Denton sign positive    Neck - Supple,  trachea midline   Lungs - Prolonged expiratory phase  Cardiovascular - Heart sounds are normal.  Regular rate and rhythm   Abdomen - Soft, nontender, nondistended, no masses or organomegaly  Neurologic - There are no focal motor or sensory deficits  Skin - No bruising or bleeding  Extremities - No clubbing, cyanosis, edema    MEDS      pneumococcal 13-valent conjugate  0.5 mL Intramuscular Once    furosemide  40 mg Intravenous BID    potassium chloride  20 mEq Oral BID    HYDROCERIN   Topical BID    ipratropium  2 puff Inhalation 4x daily    metoprolol tartrate  50 mg Oral BID    aspirin  81 mg Oral Daily    atorvastatin  40 mg Oral Nightly    finasteride  5 mg Oral Daily    mometasone-formoterol  2 puff Inhalation Q12H    tamsulosin  0.8 mg Oral Nightly      sodium chloride 20 mL/hr at 11/15/18 0421     levalbuterol, magnesium hydroxide, acetaminophen    LABS   CBC   Recent Labs      11/18/18   0538   WBC  12.0*   HGB  13.5   HCT  40.6*   MCV  95.1   PLT  235     BMP:   Lab Results   Component Value Date     11/19/2018    K 3.7 11/19/2018    CL 93 11/19/2018    CO2 35 11/19/2018    BUN 18 11/19/2018    LABALBU 4.0 10/25/2018    LABALBU 4.5 10/13/2011    CREATININE 1.01 11/19/2018    CALCIUM 8.9 11/19/2018    GFRAA >60 11/19/2018    LABGLOM >60 11/19/2018     ABGs:No results found for: PHART, PO2ART, VHV2MZU No results found for: IFIO2, MODE, SETTIDVOL, SETPEEP  Ionized Calcium:  No results found for: IONCA  Magnesium:    Lab Results   Component Value Date    MG 1.9 11/15/2018     Phosphorus:  No results found for: PHOS     LIVER PROFILE No results for input(s): AST, ALT, LIPASE, BILIDIR, BILITOT, ALKPHOS in the last 72 hours. Invalid input(s):   AMYLASE,  ALB  INR   Recent Labs      11/18/18   1021   INR  1.1     PTT   Lab Results   Component Value Date    APTT 30.0 11/14/2018         RADIOLOGY     Chest x-ray looks stable without evidence of recurrent effusion    ASSESSMENT/PLAN   Active Problems:    COPD (chronic obstructive pulmonary disease) (HCC)    Pleural effusion    Urothelial cancer ([facility] [address]  Resolved Problems:    * No resolved hospital problems.  *    Okay to discharge from pulmonary standpoint  He will need a nebulizer for home use as well as medications, face-to-face discussion done and patient is agreeable and using  Will need follow-up chest x-ray  Pleural effusion did not show any evidence of malignancy  Electronically signed by Liang Chan MD on 11/19/2018 at 4:21 PM

## 2018-11-19 NOTE — PROGRESS NOTES
Today's Date: 11/18/2018  Patient Name: Ronny Bruno  Date of admission: 11/14/2018  2:27 PM  Patient's age: [de-identified] y.o., 1940  Admission Dx: Pleural effusion [J90]  Dyspnea [R06.00]  SOB (shortness of breath) [R06.02]  Pleural effusion [J90]  Pleural effusion [J90]    Reason for Consult: known metastatic urothelial cancer  Requesting Physician: Zoila Means DO    CHIEF COMPLAINT:  sob    SUBJECTIVE:    Pt seen and examined  Patient not a good historian. Denies new complaint. Labs comfortable. Wants to go home. Pain is controlled. Shortness of breath with exertion noted. HISTORY OF PRESENT ILLNESS IN BRIEF:    This is a [de-identified] Yo male recent diagnosis of metastatic urothelial cancer was admitted with worsening SOB. He had recurrent pleural effusion and had thoracentesis with removal of 1.5 L fluid. He had recent diagnosis of urothelial carcinoma with multple bilateral lung nodules/mets and was planning to start immunotherapy. Given his age and comorbidities, chemotherapy would be high risk. No fever chills. Past Medical History:   has a past medical history of Ambulates with cane; Arrhythmia; Arthritis; At risk for falling; Atrial fibrillation ([address] Atrial flutter ([address] BPH (benign prostatic hyperplasia); Cholelithiases; COPD (chronic obstructive pulmonary disease) ([address] Full dentures; Hard of hearing; Hematuria; History of pneumonia; Hyperlipidemia; Hypertension; Kidney stone; Renal pelvis transitional cell malignant neoplasm ([address] Skin abnormalities; Sleep apnea; and Wears eyeglasses. Past Surgical History:   has a past surgical history that includes Colonoscopy; Cataract removal with implant (Bilateral, 09/2013); Total knee arthroplasty (Right, 7-8-14); Total knee arthroplasty (Left, Sept 18, 2014); knee joint manipulation (Left, 11/25/14); other surgical history (Left, 2-10-15); and CYSTOSCOPY INSERTION / REMOVAL STENT / STONE (Right, 9/14/2018).      Medications:    Prior to Admission medications    Medication Sig Start Date End Date Taking? Authorizing Provider   metoprolol tartrate (LOPRESSOR) 50 MG tablet Take 1 tablet by mouth daily 10/29/18   Melchor T Erich, DO   aspirin 81 MG tablet Take 81 mg by mouth daily    Historical Provider, MD   magnesium hydroxide (MILK OF MAGNESIA) 400 MG/5ML suspension Take by mouth daily as needed for Constipation    Historical Provider, MD   POTASSIUM CHLORIDE PO Take 20 mEq by mouth daily    Historical Provider, MD   furosemide (LASIX) 20 MG tablet Take 20 mg by mouth 2 times daily    Historical Provider, MD   albuterol sulfate  (90 Base) MCG/ACT inhaler Inhale 2 puffs into the lungs 4 times daily as needed for Wheezing    Historical Provider, MD   mometasone-formoterol (DULERA) 200-5 MCG/ACT inhaler Inhale 2 puffs into the lungs every 12 hours    Historical Provider, MD   finasteride (PROSCAR) 5 MG tablet Take 1 tablet by mouth daily 4/19/18   Guillermo Jj MD   atorvastatin (LIPITOR) 40 MG tablet Take 40 mg by mouth nightly.     Historical Provider, MD   tamsulosin (FLOMAX) 0.4 MG capsule Take 0.8 mg by mouth nightly Takes 2 tablets to equal 0.8 mg    Historical Provider, MD     Current Facility-Administered Medications   Medication Dose Route Frequency Provider Last Rate Last Dose    levalbuterol (XOPENEX) nebulization 0.63 mg  0.63 mg Nebulization Q6H PRN Melchor T Erich, DO        [START ON 11/19/2018] pneumococcal 13-valent conjugate (PREVNAR) injection 0.5 mL  0.5 mL Intramuscular Once Melchor T Erich, DO        furosemide (LASIX) injection 40 mg  40 mg Intravenous BID Melchor T Erich, DO   40 mg at 11/18/18 1831    potassium chloride (KLOR-CON) packet 20 mEq  20 mEq Oral BID Melchor T Erich, DO   20 mEq at 11/18/18 0923    HYDROCERIN cream CREA   Topical BID Melchor T Erich, DO        ipratropium (ATROVENT HFA) 17 MCG/ACT inhaler 2 puff  2 puff Inhalation 4x daily Melchor T Erich, DO   2 puff at 11/18/18 0924    metoprolol tartrate (LOPRESSOR) Dysphagia, hematemesis and hematochezia   Genitourinary: negative for frequency, dysuria, nocturia, urinary incontinence, and hematuria   Integument: negative for rash, skin lesions, bruises.    Hematologic/Lymphatic: negative for easy bruising, bleeding, lymphadenopathy, or petechiae   Endocrine: negative for heat or cold intolerance,weight changes, change in bowel habits and hair loss   Musculoskeletal: negative for myalgias, arthralgias, pain, joint swelling,and bone pain   Neurological: negative for headaches, dizziness, seizures, weakness, numbness    PHYSICAL EXAM:      /77   Pulse 82   Temp 98.5 °F (36.9 °C) (Oral)   Resp 16   Ht 6' 2\" (1.88 m)   Wt 263 lb 0.1 oz (119.3 kg)   SpO2 93%   BMI 33.77 kg/m²    Temp (24hrs), Av.8 °F (36.6 °C), Min:97 °F (36.1 °C), Max:98.5 °F (36.9 °C)    General appearance - well appearing, no in pain or distress   Mental status - alert and cooperative   Eyes - pupils equal and reactive, extraocular eye movements intact   Ears - bilateral TM's and external ear canals normal   Mouth - mucous membranes moist, pharynx normal without lesions   Neck - supple, no significant adenopathy   Lymphatics - no palpable lymphadenopathy, no hepatosplenomegaly   Chest - clear to auscultation, no wheezes, rales or rhonchi, symmetric air entry   Heart - normal rate, regular rhythm, normal S1, S2, no murmurs  Abdomen - soft, nontender, nondistended, no masses or organomegaly   Neurological - alert, oriented, normal speech, no focal findings or movement disorder noted   Musculoskeletal - no joint tenderness, deformity or swelling   Extremities - peripheral pulses normal, no pedal edema, no clubbing or cyanosis   Skin - normal coloration and turgor, no rashes, no suspicious skin lesions noted ,    DATA:    Labs:   CBC:   Recent Labs      18   0529  18   0538   WBC  9.6  12.0*   HGB  13.3*  13.5   HCT  40.3*  40.6*   PLT  222  235     BMP:   Recent Labs      18   0931 with patient and Nurse. Thank you for asking us to see this patient.

## 2018-11-20 DIAGNOSIS — C68.9 UROTHELIAL CANCER (HCC): Primary | ICD-10-CM

## 2018-11-20 LAB
CULTURE: ABNORMAL
DIRECT EXAM: ABNORMAL
DIRECT EXAM: ABNORMAL
Lab: ABNORMAL
SPECIMEN DESCRIPTION: ABNORMAL
STATUS: ABNORMAL

## 2018-11-20 NOTE — CARE COORDINATION
knees M17.0    A-fib ([facility]) I48.91    HTN (hypertension) I10    COPD (chronic obstructive pulmonary disease) ([facility]) J44.9    Hyperlipemia E78.5    DJD (degenerative joint disease) M19.90    Arthrofibrosis of total knee arthroplasty ([facility]) T84.82XA    Status post total knee replacement Z96.659    Arthrofibrosis of total knee arthroplasty ([facility]) T84.82XA    Hematuria R31.9    Pleural effusion J90    Urothelial cancer ([facility]) C68.9       Isolation/Infection:   Isolation          No Isolation            Nurse Assessment:  Last Vital Signs: /68   Pulse 82   Temp 97.9 °F (36.6 °C) (Oral)   Resp 16   Ht 6' 2\" (1.88 m)   Wt 263 lb 0.1 oz (119.3 kg)   SpO2 92%   BMI 33.77 kg/m²     Last documented pain score (0-10 scale): Pain Level: 0  Last Weight:   Wt Readings from Last 1 Encounters:   11/14/18 263 lb 0.1 oz (119.3 kg)     Mental Status:  disoriented and alert    IV Access:  - None    Nursing Mobility/ADLs:  Walking   Assisted  Transfer  Assisted  Bathing  Assisted  Dressing  Assisted  Toileting  Assisted  Feeding  Independent  Med Admin  Assisted  Med Delivery   whole    Wound Care Documentation and Therapy:        Elimination:  Continence:   · Bowel:  Yes  · Bladder: No  Urinary Catheter: None   Colostomy/Ileostomy/Ileal Conduit: No       Date of Last BM: 11/20/18    Intake/Output Summary (Last 24 hours) at 11/15/18 1035  Last data filed at 11/15/18 0239   Gross per 24 hour   Intake                0 ml   Output              375 ml   Net             -375 ml     I/O last 3 completed shifts:  In: -   Out: 375 [Urine:375]    Safety Concerns:     Sund[place], History of Falls (last 30 days) and At Risk for Falls    Impairments/Disabilities:      Speech and Hearing    Nutrition Therapy:  Current Nutrition Therapy:   - Oral Diet:  General    Routes of Feeding: Oral  Liquids: No Restrictions  Daily Fluid Restriction: no  Last Modified Barium Swallow with Video (Video Swallowing Test): not ipratropium-albuterol (DUONEB) 0.5-2.5 (3) MG/3ML SOLN nebulizer solution 3 mLs   Inhale 3 mLs into the lungs 4 times daily   Quantity: 360 mL Refills: 0           CONTINUE these medications which have CHANGED or have new prescriptions     Medication Dose   furosemide (LASIX) 20 MG tablet 40 mg   Take 2 tablets by mouth 2 times daily   Quantity: 60 tablet Refills: 3           CONTINUE these medications which have NOT CHANGED     Medication Dose   metoprolol tartrate (LOPRESSOR) 50 MG tablet 50 mg   Take 1 tablet by mouth daily   Quantity: 60 tablet Refills: 3       magnesium hydroxide (MILK OF MAGNESIA) 400 MG/5ML suspension    Take by mouth daily as needed for Constipation       albuterol sulfate  (90 Base) MCG/ACT inhaler 2 puffs   Inhale 2 puffs into the lungs 4 times daily as needed for Wheezing       mometasone-formoterol (DULERA) 200-5 MCG/ACT inhaler 2 puffs   Inhale 2 puffs into the lungs every 12 hours       finasteride (PROSCAR) 5 MG tablet 5 mg   Take 1 tablet by mouth daily   Quantity: 30 tablet Refills: 11       atorvastatin (LIPITOR) 40 MG tablet 40 mg   Take 40 mg by mouth nightly.        tamsulosin (FLOMAX) 0.4 MG capsule 0.8 mg   Take 0.8 mg by mouth nightly Takes 2 tablets to equal 0.8 mg           STOP taking these previous medications     Medication Dose Reason for Stopping Comments   (STOP TAKING) aspirin 81 MG tablet 81 mg

## 2018-11-26 DIAGNOSIS — Z79.899 LONG TERM USE OF DRUG: ICD-10-CM

## 2018-11-26 DIAGNOSIS — C68.9 UROTHELIAL CANCER (HCC): Primary | ICD-10-CM

## 2018-11-27 ENCOUNTER — HOSPITAL ENCOUNTER (OUTPATIENT)
Dept: INFUSION THERAPY | Age: 78
Discharge: HOME | End: 2018-11-27
Payer: MEDICARE

## 2018-11-27 ENCOUNTER — OFFICE VISIT (OUTPATIENT)
Dept: ONCOLOGY | Age: 78
End: 2018-11-27
Payer: MEDICARE

## 2018-11-27 VITALS
WEIGHT: 239.3 LBS | SYSTOLIC BLOOD PRESSURE: 116 MMHG | DIASTOLIC BLOOD PRESSURE: 62 MMHG | TEMPERATURE: 97.6 F | HEART RATE: 65 BPM | BODY MASS INDEX: 30.72 KG/M2

## 2018-11-27 DIAGNOSIS — C67.9 MALIGNANT NEOPLASM OF URINARY BLADDER, UNSPECIFIED SITE (HCC): Primary | ICD-10-CM

## 2018-11-27 DIAGNOSIS — C67.9 MALIGNANT NEOPLASM OF URINARY BLADDER, UNSPECIFIED SITE (HCC): ICD-10-CM

## 2018-11-27 DIAGNOSIS — C68.9 UROTHELIAL CANCER (HCC): ICD-10-CM

## 2018-11-27 DIAGNOSIS — C68.9 UROTHELIAL CANCER (HCC): Primary | ICD-10-CM

## 2018-11-27 DIAGNOSIS — Z79.899 LONG TERM USE OF DRUG: ICD-10-CM

## 2018-11-27 LAB
ABSOLUTE EOS #: 0.26 K/UL (ref 0–0.4)
ABSOLUTE IMMATURE GRANULOCYTE: ABNORMAL K/UL (ref 0–0.3)
ABSOLUTE LYMPH #: 1.12 K/UL (ref 1–4.8)
ABSOLUTE MONO #: 1.81 K/UL (ref 0.1–1.2)
ALBUMIN SERPL-MCNC: 3.5 G/DL (ref 3.5–5.2)
ALBUMIN/GLOBULIN RATIO: 0.8 (ref 1–2.5)
ALP BLD-CCNC: 89 U/L (ref 40–129)
ALT SERPL-CCNC: 17 U/L (ref 5–41)
AMYLASE: 38 U/L (ref 28–100)
ANION GAP SERPL CALCULATED.3IONS-SCNC: 15 MMOL/L (ref 9–17)
AST SERPL-CCNC: 20 U/L
BASOPHILS # BLD: 1 % (ref 0–2)
BASOPHILS ABSOLUTE: 0.09 K/UL (ref 0–0.2)
BILIRUB SERPL-MCNC: 0.66 MG/DL (ref 0.3–1.2)
BUN BLDV-MCNC: 13 MG/DL (ref 8–23)
BUN/CREAT BLD: ABNORMAL (ref 9–20)
CALCIUM SERPL-MCNC: 9.7 MG/DL (ref 8.6–10.4)
CHLORIDE BLD-SCNC: 99 MMOL/L (ref 98–107)
CO2: 28 MMOL/L (ref 20–31)
CORTISOL COLLECTION INFO: ABNORMAL
CORTISOL: 18.5 UG/DL (ref 2.7–18.4)
CREAT SERPL-MCNC: 0.99 MG/DL (ref 0.7–1.2)
DIFFERENTIAL TYPE: ABNORMAL
EOSINOPHILS RELATIVE PERCENT: 3 % (ref 1–4)
GFR AFRICAN AMERICAN: >60 ML/MIN
GFR NON-AFRICAN AMERICAN: >60 ML/MIN
GFR SERPL CREATININE-BSD FRML MDRD: ABNORMAL ML/MIN/{1.73_M2}
GFR SERPL CREATININE-BSD FRML MDRD: ABNORMAL ML/MIN/{1.73_M2}
GLUCOSE BLD-MCNC: 105 MG/DL (ref 70–99)
HCT VFR BLD CALC: 40.8 % (ref 41–53)
HEMOGLOBIN: 13.4 G/DL (ref 13.5–17.5)
IMMATURE GRANULOCYTES: ABNORMAL %
LIPASE: 13 U/L (ref 13–60)
LYMPHOCYTES # BLD: 13 % (ref 24–44)
MCH RBC QN AUTO: 31.5 PG (ref 26–34)
MCHC RBC AUTO-ENTMCNC: 32.8 G/DL (ref 31–37)
MCV RBC AUTO: 95.9 FL (ref 80–100)
MONOCYTES # BLD: 21 % (ref 2–11)
MORPHOLOGY: NORMAL
NRBC AUTOMATED: ABNORMAL PER 100 WBC
PDW BLD-RTO: 13.8 % (ref 12.5–15.4)
PLATELET # BLD: 225 K/UL (ref 140–450)
PLATELET ESTIMATE: ABNORMAL
PMV BLD AUTO: 8.7 FL (ref 6–12)
POTASSIUM SERPL-SCNC: 3.8 MMOL/L (ref 3.7–5.3)
RBC # BLD: 4.26 M/UL (ref 4.5–5.9)
RBC # BLD: ABNORMAL 10*6/UL
SEG NEUTROPHILS: 62 % (ref 36–66)
SEGMENTED NEUTROPHILS ABSOLUTE COUNT: 5.32 K/UL (ref 1.8–7.7)
SODIUM BLD-SCNC: 142 MMOL/L (ref 135–144)
THYROXINE, FREE: 1.18 NG/DL (ref 0.93–1.7)
TOTAL PROTEIN: 7.7 G/DL (ref 6.4–8.3)
TSH SERPL DL<=0.05 MIU/L-ACNC: 1.37 MIU/L (ref 0.3–5)
WBC # BLD: 8.6 K/UL (ref 3.5–11)
WBC # BLD: ABNORMAL 10*3/UL

## 2018-11-27 PROCEDURE — 99999 PR OFFICE/OUTPT VISIT,PROCEDURE ONLY: CPT | Performed by: INTERNAL MEDICINE

## 2018-11-27 PROCEDURE — 82150 ASSAY OF AMYLASE: CPT

## 2018-11-27 PROCEDURE — 83690 ASSAY OF LIPASE: CPT

## 2018-11-27 PROCEDURE — 84439 ASSAY OF FREE THYROXINE: CPT

## 2018-11-27 PROCEDURE — 36415 COLL VENOUS BLD VENIPUNCTURE: CPT

## 2018-11-27 PROCEDURE — 84443 ASSAY THYROID STIM HORMONE: CPT

## 2018-11-27 PROCEDURE — 80053 COMPREHEN METABOLIC PANEL: CPT

## 2018-11-27 PROCEDURE — 82533 TOTAL CORTISOL: CPT

## 2018-11-27 PROCEDURE — 2580000003 HC RX 258: Performed by: INTERNAL MEDICINE

## 2018-11-27 PROCEDURE — 96413 CHEMO IV INFUSION 1 HR: CPT

## 2018-11-27 PROCEDURE — 6360000002 HC RX W HCPCS: Performed by: INTERNAL MEDICINE

## 2018-11-27 PROCEDURE — 99215 OFFICE O/P EST HI 40 MIN: CPT | Performed by: INTERNAL MEDICINE

## 2018-11-27 PROCEDURE — 85025 COMPLETE CBC W/AUTO DIFF WBC: CPT

## 2018-11-27 RX ORDER — SODIUM CHLORIDE 9 MG/ML
INJECTION, SOLUTION INTRAVENOUS ONCE
Status: COMPLETED | OUTPATIENT
Start: 2018-11-27 | End: 2018-11-27

## 2018-11-27 RX ORDER — EPINEPHRINE 1 MG/ML
0.3 INJECTION, SOLUTION, CONCENTRATE INTRAVENOUS PRN
Status: CANCELLED | OUTPATIENT
Start: 2018-11-27

## 2018-11-27 RX ORDER — SODIUM CHLORIDE 0.9 % (FLUSH) 0.9 %
5 SYRINGE (ML) INJECTION PRN
Status: CANCELLED | OUTPATIENT
Start: 2018-11-27

## 2018-11-27 RX ORDER — SODIUM CHLORIDE 0.9 % (FLUSH) 0.9 %
10 SYRINGE (ML) INJECTION PRN
Status: CANCELLED | OUTPATIENT
Start: 2018-11-27

## 2018-11-27 RX ORDER — METHYLPREDNISOLONE SODIUM SUCCINATE 125 MG/2ML
125 INJECTION, POWDER, LYOPHILIZED, FOR SOLUTION INTRAMUSCULAR; INTRAVENOUS ONCE
Status: CANCELLED | OUTPATIENT
Start: 2018-11-27 | End: 2018-11-27

## 2018-11-27 RX ORDER — MEPERIDINE HYDROCHLORIDE 50 MG/ML
12.5 INJECTION INTRAMUSCULAR; INTRAVENOUS; SUBCUTANEOUS ONCE
Status: CANCELLED | OUTPATIENT
Start: 2018-11-27 | End: 2018-11-27

## 2018-11-27 RX ORDER — 0.9 % SODIUM CHLORIDE 0.9 %
10 VIAL (ML) INJECTION ONCE
Status: CANCELLED | OUTPATIENT
Start: 2018-11-27 | End: 2018-11-27

## 2018-11-27 RX ORDER — HEPARIN SODIUM (PORCINE) LOCK FLUSH IV SOLN 100 UNIT/ML 100 UNIT/ML
500 SOLUTION INTRAVENOUS PRN
Status: CANCELLED | OUTPATIENT
Start: 2018-11-27

## 2018-11-27 RX ORDER — SODIUM CHLORIDE 9 MG/ML
INJECTION, SOLUTION INTRAVENOUS CONTINUOUS
Status: CANCELLED | OUTPATIENT
Start: 2018-11-27

## 2018-11-27 RX ORDER — DIPHENHYDRAMINE HYDROCHLORIDE 50 MG/ML
50 INJECTION INTRAMUSCULAR; INTRAVENOUS ONCE
Status: CANCELLED | OUTPATIENT
Start: 2018-11-27 | End: 2018-11-27

## 2018-11-27 RX ADMIN — SODIUM CHLORIDE: 9 INJECTION, SOLUTION INTRAVENOUS at 09:53

## 2018-11-27 RX ADMIN — ATEZOLIZUMAB 1200 MG: 1200 INJECTION, SOLUTION INTRAVENOUS at 10:06

## 2018-11-27 NOTE — PROGRESS NOTES
Pt here for C.1D1. Denies any new complaints. Labs drawn from port and results reviewed. Had Dr malone prior to treatment. Chemo teach completed. Pt was treated without incident and d/c'd in stable condition. Missyphilippe Brown will return 12/18 for treatment and Dr visit.

## 2018-11-29 DIAGNOSIS — C67.9 MALIGNANT NEOPLASM OF URINARY BLADDER, UNSPECIFIED SITE (HCC): Primary | ICD-10-CM

## 2018-12-07 ENCOUNTER — HOSPITAL ENCOUNTER (OUTPATIENT)
Dept: CT IMAGING | Age: 78
Discharge: HOME | End: 2018-12-09
Payer: MEDICARE

## 2018-12-07 DIAGNOSIS — C68.9 UROTHELIAL CANCER (HCC): ICD-10-CM

## 2018-12-07 PROCEDURE — 2580000003 HC RX 258: Performed by: INTERNAL MEDICINE

## 2018-12-07 PROCEDURE — 70460 CT HEAD/BRAIN W/DYE: CPT

## 2018-12-07 PROCEDURE — 71260 CT THORAX DX C+: CPT

## 2018-12-07 PROCEDURE — 74177 CT ABD & PELVIS W/CONTRAST: CPT

## 2018-12-07 PROCEDURE — 6360000004 HC RX CONTRAST MEDICATION: Performed by: INTERNAL MEDICINE

## 2018-12-07 RX ORDER — SODIUM CHLORIDE 0.9 % (FLUSH) 0.9 %
10 SYRINGE (ML) INJECTION 2 TIMES DAILY
Status: DISCONTINUED | OUTPATIENT
Start: 2018-12-07 | End: 2018-12-10 | Stop reason: HOSPADM

## 2018-12-07 RX ORDER — 0.9 % SODIUM CHLORIDE 0.9 %
80 INTRAVENOUS SOLUTION INTRAVENOUS ONCE
Status: COMPLETED | OUTPATIENT
Start: 2018-12-07 | End: 2018-12-07

## 2018-12-07 RX ADMIN — IOPAMIDOL 100 ML: 755 INJECTION, SOLUTION INTRAVENOUS at 13:41

## 2018-12-07 RX ADMIN — SODIUM CHLORIDE 80 ML: 9 INJECTION, SOLUTION INTRAVENOUS at 13:42

## 2018-12-07 RX ADMIN — Medication 10 ML: at 13:42

## 2018-12-17 LAB
CULTURE: NORMAL
Lab: NORMAL
SPECIMEN DESCRIPTION: NORMAL
STATUS: NORMAL

## 2018-12-18 ENCOUNTER — OFFICE VISIT (OUTPATIENT)
Dept: ONCOLOGY | Age: 78
End: 2018-12-18
Payer: MEDICARE

## 2018-12-18 ENCOUNTER — HOSPITAL ENCOUNTER (OUTPATIENT)
Dept: INFUSION THERAPY | Age: 78
Discharge: HOME | End: 2018-12-18
Payer: MEDICARE

## 2018-12-18 VITALS
WEIGHT: 235 LBS | BODY MASS INDEX: 30.17 KG/M2 | SYSTOLIC BLOOD PRESSURE: 110 MMHG | DIASTOLIC BLOOD PRESSURE: 47 MMHG | HEART RATE: 60 BPM | TEMPERATURE: 96.6 F

## 2018-12-18 DIAGNOSIS — C67.9 MALIGNANT NEOPLASM OF URINARY BLADDER, UNSPECIFIED SITE (HCC): ICD-10-CM

## 2018-12-18 DIAGNOSIS — Z79.899 LONG TERM USE OF DRUG: ICD-10-CM

## 2018-12-18 DIAGNOSIS — C68.9 UROTHELIAL CANCER (HCC): ICD-10-CM

## 2018-12-18 DIAGNOSIS — C67.9 MALIGNANT NEOPLASM OF URINARY BLADDER, UNSPECIFIED SITE (HCC): Primary | ICD-10-CM

## 2018-12-18 LAB
ABSOLUTE EOS #: 0.09 K/UL (ref 0–0.4)
ABSOLUTE IMMATURE GRANULOCYTE: ABNORMAL K/UL (ref 0–0.3)
ABSOLUTE LYMPH #: 1.14 K/UL (ref 1–4.8)
ABSOLUTE MONO #: 1.76 K/UL (ref 0.1–0.8)
ALBUMIN SERPL-MCNC: 3.4 G/DL (ref 3.5–5.2)
ALBUMIN/GLOBULIN RATIO: 0.7 (ref 1–2.5)
ALP BLD-CCNC: 115 U/L (ref 40–129)
ALT SERPL-CCNC: 16 U/L (ref 5–41)
ANION GAP SERPL CALCULATED.3IONS-SCNC: 10 MMOL/L (ref 9–17)
AST SERPL-CCNC: 23 U/L
BASOPHILS # BLD: 1 % (ref 0–2)
BASOPHILS ABSOLUTE: 0.09 K/UL (ref 0–0.2)
BILIRUB SERPL-MCNC: 0.91 MG/DL (ref 0.3–1.2)
BUN BLDV-MCNC: 14 MG/DL (ref 8–23)
BUN/CREAT BLD: ABNORMAL (ref 9–20)
CALCIUM SERPL-MCNC: 8.9 MG/DL (ref 8.6–10.4)
CHLORIDE BLD-SCNC: 99 MMOL/L (ref 98–107)
CO2: 30 MMOL/L (ref 20–31)
CREAT SERPL-MCNC: 1.03 MG/DL (ref 0.7–1.2)
DIFFERENTIAL TYPE: ABNORMAL
EOSINOPHILS RELATIVE PERCENT: 1 % (ref 1–4)
GFR AFRICAN AMERICAN: >60 ML/MIN
GFR NON-AFRICAN AMERICAN: >60 ML/MIN
GFR SERPL CREATININE-BSD FRML MDRD: ABNORMAL ML/MIN/{1.73_M2}
GFR SERPL CREATININE-BSD FRML MDRD: ABNORMAL ML/MIN/{1.73_M2}
GLUCOSE BLD-MCNC: 114 MG/DL (ref 70–99)
HCT VFR BLD CALC: 41.8 % (ref 41–53)
HEMOGLOBIN: 13.6 G/DL (ref 13.5–17.5)
IMMATURE GRANULOCYTES: ABNORMAL %
LYMPHOCYTES # BLD: 13 % (ref 24–44)
MCH RBC QN AUTO: 30.5 PG (ref 26–34)
MCHC RBC AUTO-ENTMCNC: 32.6 G/DL (ref 31–37)
MCV RBC AUTO: 93.4 FL (ref 80–100)
MONOCYTES # BLD: 20 % (ref 1–7)
MORPHOLOGY: NORMAL
NRBC AUTOMATED: ABNORMAL PER 100 WBC
PDW BLD-RTO: 14 % (ref 12.5–15.4)
PLATELET # BLD: 232 K/UL (ref 140–450)
PLATELET ESTIMATE: ABNORMAL
PMV BLD AUTO: 8.3 FL (ref 6–12)
POTASSIUM SERPL-SCNC: 4.2 MMOL/L (ref 3.7–5.3)
RBC # BLD: 4.47 M/UL (ref 4.5–5.9)
RBC # BLD: ABNORMAL 10*6/UL
SEG NEUTROPHILS: 65 % (ref 36–66)
SEGMENTED NEUTROPHILS ABSOLUTE COUNT: 5.72 K/UL (ref 1.8–7.7)
SODIUM BLD-SCNC: 139 MMOL/L (ref 135–144)
TOTAL PROTEIN: 8.2 G/DL (ref 6.4–8.3)
TSH SERPL DL<=0.05 MIU/L-ACNC: 1.25 MIU/L (ref 0.3–5)
WBC # BLD: 8.8 K/UL (ref 3.5–11)
WBC # BLD: ABNORMAL 10*3/UL

## 2018-12-18 PROCEDURE — 84443 ASSAY THYROID STIM HORMONE: CPT

## 2018-12-18 PROCEDURE — 99215 OFFICE O/P EST HI 40 MIN: CPT | Performed by: INTERNAL MEDICINE

## 2018-12-18 PROCEDURE — 6360000002 HC RX W HCPCS: Performed by: INTERNAL MEDICINE

## 2018-12-18 PROCEDURE — 96413 CHEMO IV INFUSION 1 HR: CPT | Performed by: NURSE PRACTITIONER

## 2018-12-18 PROCEDURE — 80053 COMPREHEN METABOLIC PANEL: CPT

## 2018-12-18 PROCEDURE — 85025 COMPLETE CBC W/AUTO DIFF WBC: CPT

## 2018-12-18 PROCEDURE — 36415 COLL VENOUS BLD VENIPUNCTURE: CPT

## 2018-12-18 PROCEDURE — 2580000003 HC RX 258: Performed by: INTERNAL MEDICINE

## 2018-12-18 RX ORDER — METHYLPREDNISOLONE SODIUM SUCCINATE 125 MG/2ML
125 INJECTION, POWDER, LYOPHILIZED, FOR SOLUTION INTRAMUSCULAR; INTRAVENOUS ONCE
Status: CANCELLED | OUTPATIENT
Start: 2018-12-18 | End: 2018-12-18

## 2018-12-18 RX ORDER — SODIUM CHLORIDE 9 MG/ML
INJECTION, SOLUTION INTRAVENOUS ONCE
Status: COMPLETED | OUTPATIENT
Start: 2018-12-18 | End: 2018-12-18

## 2018-12-18 RX ORDER — 0.9 % SODIUM CHLORIDE 0.9 %
10 VIAL (ML) INJECTION ONCE
Status: CANCELLED | OUTPATIENT
Start: 2018-12-18 | End: 2018-12-18

## 2018-12-18 RX ORDER — HEPARIN SODIUM (PORCINE) LOCK FLUSH IV SOLN 100 UNIT/ML 100 UNIT/ML
500 SOLUTION INTRAVENOUS PRN
Status: CANCELLED | OUTPATIENT
Start: 2018-12-18

## 2018-12-18 RX ORDER — MEPERIDINE HYDROCHLORIDE 50 MG/ML
12.5 INJECTION INTRAMUSCULAR; INTRAVENOUS; SUBCUTANEOUS ONCE
Status: CANCELLED | OUTPATIENT
Start: 2018-12-18 | End: 2018-12-18

## 2018-12-18 RX ORDER — SODIUM CHLORIDE 9 MG/ML
INJECTION, SOLUTION INTRAVENOUS CONTINUOUS
Status: CANCELLED | OUTPATIENT
Start: 2018-12-18

## 2018-12-18 RX ORDER — DIPHENHYDRAMINE HYDROCHLORIDE 50 MG/ML
50 INJECTION INTRAMUSCULAR; INTRAVENOUS ONCE
Status: CANCELLED | OUTPATIENT
Start: 2018-12-18 | End: 2018-12-18

## 2018-12-18 RX ORDER — SODIUM CHLORIDE 0.9 % (FLUSH) 0.9 %
10 SYRINGE (ML) INJECTION PRN
Status: CANCELLED | OUTPATIENT
Start: 2018-12-18

## 2018-12-18 RX ORDER — SODIUM CHLORIDE 0.9 % (FLUSH) 0.9 %
5 SYRINGE (ML) INJECTION PRN
Status: CANCELLED | OUTPATIENT
Start: 2018-12-18

## 2018-12-18 RX ORDER — SODIUM CHLORIDE 9 MG/ML
INJECTION, SOLUTION INTRAVENOUS ONCE
Status: CANCELLED | OUTPATIENT
Start: 2018-12-18 | End: 2018-12-18

## 2018-12-18 RX ADMIN — SODIUM CHLORIDE: 9 INJECTION, SOLUTION INTRAVENOUS at 09:37

## 2018-12-18 RX ADMIN — ATEZOLIZUMAB 1200 MG: 1200 INJECTION, SOLUTION INTRAVENOUS at 09:55

## 2018-12-18 NOTE — PROGRESS NOTES
Current Outpatient Prescriptions   Medication Sig Dispense Refill    furosemide (LASIX) 20 MG tablet Take 2 tablets by mouth 2 times daily 60 tablet 3    ipratropium-albuterol (DUONEB) 0.5-2.5 (3) MG/3ML SOLN nebulizer solution Inhale 3 mLs into the lungs 4 times daily 360 mL 0    metoprolol tartrate (LOPRESSOR) 50 MG tablet Take 1 tablet by mouth daily 60 tablet 3    magnesium hydroxide (MILK OF MAGNESIA) 400 MG/5ML suspension Take by mouth daily as needed for Constipation      POTASSIUM CHLORIDE PO Take 20 mEq by mouth daily      albuterol sulfate  (90 Base) MCG/ACT inhaler Inhale 2 puffs into the lungs 4 times daily as needed for Wheezing      mometasone-formoterol (DULERA) 200-5 MCG/ACT inhaler Inhale 2 puffs into the lungs every 12 hours      finasteride (PROSCAR) 5 MG tablet Take 1 tablet by mouth daily 30 tablet 11    atorvastatin (LIPITOR) 40 MG tablet Take 40 mg by mouth nightly.  tamsulosin (FLOMAX) 0.4 MG capsule Take 0.8 mg by mouth nightly Takes 2 tablets to equal 0.8 mg       No current facility-administered medications for this visit.         Social History     Social History    Marital status:      Spouse name: La Nena Vazquez Number of children: 3    Years of education: N/A     Occupational History    retired [employer]     Social History Main Topics    Smoking status: Former Smoker     Packs/day: 0.50     Years: 60.00     Start date: 1948     Quit date: 4/17/2017    Smokeless tobacco: Never Used    Alcohol use No      Comment: quit in 2000    Drug use: No    Sexual activity: Yes     Partners: Female     Other Topics Concern    Not on file     Social History Narrative    No narrative on file       Family History   Problem Relation Age of Onset    Colon Cancer Brother     Diabetes Mother     Other Father         black lung disease    No Known Problems Maternal Grandmother     No Known Problems Maternal Grandfather     No Known Problems Paternal Neurological - alert, oriented, normal speech, no focal findings or movement disorder noted   Musculoskeletal - no joint tenderness, deformity or swelling   Extremities - peripheral pulses normal, no pedal edema, no clubbing or cyanosis   Skin - normal coloration and turgor, no rashes, no suspicious skin lesions noted   LABORATORY DATA:     Lab Results   Component Value Date    WBC 8.8 12/18/2018    HGB 13.6 12/18/2018    HCT 41.8 12/18/2018    MCV 93.4 12/18/2018     12/18/2018    LYMPHOPCT 13 (L) 12/18/2018    RBC 4.47 (L) 12/18/2018    MCH 30.5 12/18/2018    MCHC 32.6 12/18/2018    RDW 14.0 12/18/2018    MONOPCT 20 (H) 12/18/2018    BASOPCT 1 12/18/2018    NEUTROABS 5.72 12/18/2018    LYMPHSABS 1.14 12/18/2018    MONOSABS 1.76 (H) 12/18/2018    EOSABS 0.09 12/18/2018    BASOSABS 0.09 12/18/2018         Chemistry        Component Value Date/Time     12/18/2018 0818    K 4.2 12/18/2018 0818    CL 99 12/18/2018 0818    CO2 30 12/18/2018 0818    BUN 14 12/18/2018 0818    CREATININE 1.03 12/18/2018 0818        Component Value Date/Time    CALCIUM 8.9 12/18/2018 0818    ALKPHOS 115 12/18/2018 0818    AST 23 12/18/2018 0818    ALT 16 12/18/2018 0818    BILITOT 0.91 12/18/2018 0818        PATHOLOGY DATA:   RIGHT RENAL PELVIS, BIOPSY:         HIGH GRADE UROTHELIAL CARCINOMA (SEE COMMENT)     Diagnosis Comment   Within the observed sections, there is no muscularis propria   identified.  There are no definitive features of invasion identified   within this limited biopsy.  Please correlate with appropriate   clinical findings.  Findings discussed with Dr. Jane Moore on September 19, 2018. IMAGING DATA:    CT scan chest abdomen pelvis 12/7/18  Impression   CT evidence of progression of disease within the chest and abdomen.       *Interval increase in size in number of the patient's bilateral pulmonary   metastases.    *Interval worsening of the patient's large left-sided pleural effusion since   the prior

## 2018-12-18 NOTE — PROGRESS NOTES
[facility] Initial Nutrition Assessment    Jaycee Davis is a [de-identified] y.o.  male     Reason for Evaluation: PGSGA score 12    Subjective/Current Data:  Pt reports appetite has been \"fair,\" states he consumes [place] lot of juice. \" Pt states unplanned wt loss 25# (9.5%) x 6 months. Pt states some of weight loss has been good because he is able to move a little easier. Pt's family indicates pt has physical therapy in house and has been doing very well. Pt and family do not seem concerned about pt's nutrition status. RD educated family and pt on high calorie foods, and having a protein source at each meal such as eggs, beans, cheese, milk, meat, protein shake. Receptive to information. Past Medical History:  Past Medical History:   Diagnosis Date    Ambulates with cane     unstable, total knee surgery didn't help stability    Arrhythmia     Arthritis     o.a.    At risk for falling     unstable walking, uses cane    Atrial fibrillation ([address] 11/25/2014    on EKG    Atrial flutter ([address] 05/13/2014    on EKG    BPH (benign prostatic hyperplasia)     Cholelithiases     seen on CT    COPD (chronic obstructive pulmonary disease) (HCC)     Inhalers    Full dentures     Upper only    Hard of hearing     no hearing aids    Hematuria     History of pneumonia 02/2018    Hyperlipidemia     Hypertension     on Rx.  Kidney stone     seen on CT, 2 mm right ureter    Renal pelvis transitional cell malignant neoplasm ([address] 2018    Rt.     Skin abnormalities     gets random SCABS ON ARMS    Sleep apnea     No CPAP    Wears eyeglasses      Past Surgical History:   Procedure Laterality Date    CATARACT REMOVAL WITH IMPLANT Bilateral 09/2013    COLONOSCOPY      polypectomy    CYSTOSCOPY INSERTION / REMOVAL STENT / STONE Right 9/14/2018    CYSTO URETEROSCOPY  W/STENT INSERTION &  RENAL PELVIC  BIOPSY AND RETROGRADE PYLEOGRAM performed by Yin Stevens MD at [address] Left 11/25/14    OTHER SURGICAL HISTORY Left 2-10-15    Knee arthroplasty revision- poly exchange.  TOTAL KNEE ARTHROPLASTY Right 7-8-14    TOTAL KNEE ARTHROPLASTY Left Sept 18, 2014        Anthropometric Measures:  Current Weight:   106.6kg  Ideal Body Weight: 86.4 kg  Ideal Body Weight %: 123%    Nutritional Requirements:  Estimated Energy Needs:  Weight Used: 106.6kg   Method Used: [place] Jeor  Estimated kcal Needs: 2400 kcal per day  Protein Needs:  Weight used: 106.6 kg  1.2 g/kg = 127 g per day    Nutrition-focused Physical Findings   GI symptoms: poor appetite  Skin:  Intact    Nutrition Diagnosis and Goal  Problem:  Increased nutrient needs  Etiology/related to: catabolic illness  Symptoms/Signs/as evidenced by: unplanned wt loss, poor PO intakes, bladder ca       Goal: Adequate intake to aide in wt maintenaince, signs and symptoms management  Progress towards goal: unchanged  Education Needs: none at present time     Malnutrition Assessment  · Patient is at risk for malnutrition based on a history of weight loss and current intake. Per AND/ASPEN guidelines, will monitor for additional RD, RN and MD documentation re weight status, nutritional intake, fluid accumulation, possible loss of body fat or muscle mass, or possible reduced  strength. NUTRITION RECOMMENDATIONS / MONITORING / EVALUATION  1. Encourage high calorie, high protein snacks or small, frequent meals. 2. Will monitor wts, labs, PO intakes, s/s management.       Maddie Gonzalez RD, LD  Registered Dietitian  [facility]     Office: [phone]  Cell: [phone]'

## 2018-12-18 NOTE — PROGRESS NOTES
Patient was seen by Dr. Charley Brink today. See his dictation for details. Tecentriq given as ordered, patient tolerated c2d1 well. D/c'd stable accompanied by family.

## 2018-12-21 ENCOUNTER — HOSPITAL ENCOUNTER (OUTPATIENT)
Dept: INTERVENTIONAL RADIOLOGY/VASCULAR | Age: 78
Discharge: HOME | End: 2018-12-23
Payer: MEDICARE

## 2018-12-21 ENCOUNTER — HOSPITAL ENCOUNTER (OUTPATIENT)
Dept: GENERAL RADIOLOGY | Age: 78
Discharge: HOME | End: 2018-12-23
Payer: MEDICARE

## 2018-12-21 VITALS
DIASTOLIC BLOOD PRESSURE: 65 MMHG | SYSTOLIC BLOOD PRESSURE: 107 MMHG | WEIGHT: 232 LBS | RESPIRATION RATE: 16 BRPM | HEART RATE: 70 BPM | OXYGEN SATURATION: 95 % | TEMPERATURE: 98.4 F | BODY MASS INDEX: 29.77 KG/M2 | HEIGHT: 74 IN

## 2018-12-21 DIAGNOSIS — C67.9 MALIGNANT NEOPLASM OF URINARY BLADDER, UNSPECIFIED SITE (HCC): ICD-10-CM

## 2018-12-21 LAB
INR BLD: 1.1
PARTIAL THROMBOPLASTIN TIME: 31.5 SEC (ref 23–31)
PLATELET # BLD: 211 K/UL (ref 150–450)
PROTHROMBIN TIME: 11.4 SEC (ref 9.7–12)

## 2018-12-21 PROCEDURE — 7100000031 HC ASPR PHASE II RECOVERY - ADDTL 15 MIN

## 2018-12-21 PROCEDURE — 2709999900 IR GUIDED THORACENTESIS PLEURAL

## 2018-12-21 PROCEDURE — 88305 TISSUE EXAM BY PATHOLOGIST: CPT

## 2018-12-21 PROCEDURE — 2500000003 HC RX 250 WO HCPCS: Performed by: RADIOLOGY

## 2018-12-21 PROCEDURE — 71045 X-RAY EXAM CHEST 1 VIEW: CPT

## 2018-12-21 PROCEDURE — 32555 ASPIRATE PLEURA W/ IMAGING: CPT | Performed by: RADIOLOGY

## 2018-12-21 PROCEDURE — 7100000011 HC PHASE II RECOVERY - ADDTL 15 MIN

## 2018-12-21 PROCEDURE — 7100000030 HC ASPR PHASE II RECOVERY - FIRST 15 MIN

## 2018-12-21 PROCEDURE — 88112 CYTOPATH CELL ENHANCE TECH: CPT

## 2018-12-21 PROCEDURE — 85730 THROMBOPLASTIN TIME PARTIAL: CPT

## 2018-12-21 PROCEDURE — 7100000010 HC PHASE II RECOVERY - FIRST 15 MIN

## 2018-12-21 PROCEDURE — 88342 IMHCHEM/IMCYTCHM 1ST ANTB: CPT

## 2018-12-21 PROCEDURE — 85049 AUTOMATED PLATELET COUNT: CPT

## 2018-12-21 PROCEDURE — 88341 IMHCHEM/IMCYTCHM EA ADD ANTB: CPT

## 2018-12-21 PROCEDURE — 85610 PROTHROMBIN TIME: CPT

## 2018-12-21 PROCEDURE — 36415 COLL VENOUS BLD VENIPUNCTURE: CPT

## 2018-12-21 RX ORDER — ACETAMINOPHEN 325 MG/1
650 TABLET ORAL EVERY 4 HOURS PRN
Status: DISCONTINUED | OUTPATIENT
Start: 2018-12-21 | End: 2018-12-24 | Stop reason: HOSPADM

## 2018-12-21 RX ORDER — LIDOCAINE HYDROCHLORIDE 10 MG/ML
INJECTION, SOLUTION INFILTRATION; PERINEURAL
Status: COMPLETED | OUTPATIENT
Start: 2018-12-21 | End: 2018-12-21

## 2018-12-21 RX ADMIN — LIDOCAINE HYDROCHLORIDE 2 ML: 10 INJECTION, SOLUTION INFILTRATION; PERINEURAL at 10:57

## 2018-12-21 ASSESSMENT — PAIN SCALES - GENERAL
PAINLEVEL_OUTOF10: 0

## 2018-12-21 NOTE — PROGRESS NOTES
Writer calls and speaks with Dr. Aimee Ham. Dr. Aimee Ham reports he has viewed chest x ray and patient may be discharged.

## 2018-12-26 LAB — SURGICAL PATHOLOGY REPORT: NORMAL

## 2018-12-31 LAB
CULTURE: NORMAL
DIRECT EXAM: NORMAL
Lab: NORMAL
SPECIMEN DESCRIPTION: NORMAL
STATUS: NORMAL

## 2019-01-08 ENCOUNTER — OFFICE VISIT (OUTPATIENT)
Dept: ONCOLOGY | Age: 79
End: 2019-01-08
Payer: MEDICARE

## 2019-01-08 ENCOUNTER — HOSPITAL ENCOUNTER (OUTPATIENT)
Dept: INFUSION THERAPY | Age: 79
Discharge: HOME | End: 2019-01-08
Payer: MEDICARE

## 2019-01-08 VITALS
HEART RATE: 67 BPM | TEMPERATURE: 98.5 F | SYSTOLIC BLOOD PRESSURE: 96 MMHG | BODY MASS INDEX: 30.3 KG/M2 | DIASTOLIC BLOOD PRESSURE: 51 MMHG | WEIGHT: 236 LBS

## 2019-01-08 DIAGNOSIS — C68.9 UROTHELIAL CANCER (HCC): ICD-10-CM

## 2019-01-08 DIAGNOSIS — C67.9 MALIGNANT NEOPLASM OF URINARY BLADDER, UNSPECIFIED SITE (HCC): ICD-10-CM

## 2019-01-08 DIAGNOSIS — C67.9 MALIGNANT NEOPLASM OF URINARY BLADDER, UNSPECIFIED SITE (HCC): Primary | ICD-10-CM

## 2019-01-08 LAB
ABSOLUTE EOS #: 0 K/UL (ref 0–0.4)
ABSOLUTE IMMATURE GRANULOCYTE: ABNORMAL K/UL (ref 0–0.3)
ABSOLUTE LYMPH #: 1.57 K/UL (ref 1–4.8)
ABSOLUTE MONO #: 2.09 K/UL (ref 0.1–0.8)
ALBUMIN SERPL-MCNC: 3.3 G/DL (ref 3.5–5.2)
ALBUMIN/GLOBULIN RATIO: 0.7 (ref 1–2.5)
ALP BLD-CCNC: 113 U/L (ref 40–129)
ALT SERPL-CCNC: 6 U/L (ref 5–41)
ANION GAP SERPL CALCULATED.3IONS-SCNC: 15 MMOL/L (ref 9–17)
AST SERPL-CCNC: 22 U/L
BASOPHILS # BLD: 0 % (ref 0–2)
BASOPHILS ABSOLUTE: 0 K/UL (ref 0–0.2)
BILIRUB SERPL-MCNC: 0.7 MG/DL (ref 0.3–1.2)
BUN BLDV-MCNC: 9 MG/DL (ref 8–23)
BUN/CREAT BLD: ABNORMAL (ref 9–20)
CALCIUM SERPL-MCNC: 9.1 MG/DL (ref 8.6–10.4)
CHLORIDE BLD-SCNC: 100 MMOL/L (ref 98–107)
CO2: 26 MMOL/L (ref 20–31)
CREAT SERPL-MCNC: 0.8 MG/DL (ref 0.7–1.2)
DIFFERENTIAL TYPE: ABNORMAL
EOSINOPHILS RELATIVE PERCENT: 0 % (ref 1–4)
GFR AFRICAN AMERICAN: >60 ML/MIN
GFR NON-AFRICAN AMERICAN: >60 ML/MIN
GFR SERPL CREATININE-BSD FRML MDRD: ABNORMAL ML/MIN/{1.73_M2}
GFR SERPL CREATININE-BSD FRML MDRD: ABNORMAL ML/MIN/{1.73_M2}
GLUCOSE BLD-MCNC: 110 MG/DL (ref 70–99)
HCT VFR BLD CALC: 39.7 % (ref 41–53)
HEMOGLOBIN: 13 G/DL (ref 13.5–17.5)
IMMATURE GRANULOCYTES: ABNORMAL %
LYMPHOCYTES # BLD: 18 % (ref 24–44)
MCH RBC QN AUTO: 29.8 PG (ref 26–34)
MCHC RBC AUTO-ENTMCNC: 32.7 G/DL (ref 31–37)
MCV RBC AUTO: 91.2 FL (ref 80–100)
MONOCYTES # BLD: 24 % (ref 1–7)
MORPHOLOGY: NORMAL
NRBC AUTOMATED: ABNORMAL PER 100 WBC
PDW BLD-RTO: 14.2 % (ref 12.5–15.4)
PLATELET # BLD: 215 K/UL (ref 140–450)
PLATELET ESTIMATE: ABNORMAL
PMV BLD AUTO: 8.3 FL (ref 6–12)
POTASSIUM SERPL-SCNC: 4 MMOL/L (ref 3.7–5.3)
RBC # BLD: 4.36 M/UL (ref 4.5–5.9)
RBC # BLD: ABNORMAL 10*6/UL
SEG NEUTROPHILS: 58 % (ref 36–66)
SEGMENTED NEUTROPHILS ABSOLUTE COUNT: 5.04 K/UL (ref 1.8–7.7)
SODIUM BLD-SCNC: 141 MMOL/L (ref 135–144)
TOTAL PROTEIN: 8 G/DL (ref 6.4–8.3)
WBC # BLD: 8.7 K/UL (ref 3.5–11)
WBC # BLD: ABNORMAL 10*3/UL

## 2019-01-08 PROCEDURE — 96413 CHEMO IV INFUSION 1 HR: CPT

## 2019-01-08 PROCEDURE — 6360000002 HC RX W HCPCS: Performed by: INTERNAL MEDICINE

## 2019-01-08 PROCEDURE — 85025 COMPLETE CBC W/AUTO DIFF WBC: CPT

## 2019-01-08 PROCEDURE — 80053 COMPREHEN METABOLIC PANEL: CPT

## 2019-01-08 PROCEDURE — 99215 OFFICE O/P EST HI 40 MIN: CPT | Performed by: INTERNAL MEDICINE

## 2019-01-08 PROCEDURE — 36415 COLL VENOUS BLD VENIPUNCTURE: CPT

## 2019-01-08 PROCEDURE — 2580000003 HC RX 258: Performed by: INTERNAL MEDICINE

## 2019-01-08 RX ORDER — MEPERIDINE HYDROCHLORIDE 50 MG/ML
12.5 INJECTION INTRAMUSCULAR; INTRAVENOUS; SUBCUTANEOUS ONCE
Status: CANCELLED | OUTPATIENT
Start: 2019-01-08 | End: 2019-01-08

## 2019-01-08 RX ORDER — SODIUM CHLORIDE 0.9 % (FLUSH) 0.9 %
10 SYRINGE (ML) INJECTION PRN
Status: CANCELLED | OUTPATIENT
Start: 2019-01-08

## 2019-01-08 RX ORDER — SODIUM CHLORIDE 0.9 % (FLUSH) 0.9 %
5 SYRINGE (ML) INJECTION PRN
Status: CANCELLED | OUTPATIENT
Start: 2019-01-08

## 2019-01-08 RX ORDER — SODIUM CHLORIDE 9 MG/ML
INJECTION, SOLUTION INTRAVENOUS CONTINUOUS
Status: CANCELLED | OUTPATIENT
Start: 2019-01-08

## 2019-01-08 RX ORDER — HEPARIN SODIUM (PORCINE) LOCK FLUSH IV SOLN 100 UNIT/ML 100 UNIT/ML
500 SOLUTION INTRAVENOUS PRN
Status: CANCELLED | OUTPATIENT
Start: 2019-01-08

## 2019-01-08 RX ORDER — SODIUM CHLORIDE 9 MG/ML
INJECTION, SOLUTION INTRAVENOUS ONCE
Status: COMPLETED | OUTPATIENT
Start: 2019-01-08 | End: 2019-01-08

## 2019-01-08 RX ORDER — 0.9 % SODIUM CHLORIDE 0.9 %
10 VIAL (ML) INJECTION ONCE
Status: CANCELLED | OUTPATIENT
Start: 2019-01-08 | End: 2019-01-08

## 2019-01-08 RX ORDER — SODIUM CHLORIDE 9 MG/ML
INJECTION, SOLUTION INTRAVENOUS ONCE
Status: CANCELLED | OUTPATIENT
Start: 2019-01-08 | End: 2019-01-08

## 2019-01-08 RX ORDER — DIPHENHYDRAMINE HYDROCHLORIDE 50 MG/ML
50 INJECTION INTRAMUSCULAR; INTRAVENOUS ONCE
Status: CANCELLED | OUTPATIENT
Start: 2019-01-08 | End: 2019-01-08

## 2019-01-08 RX ORDER — METHYLPREDNISOLONE SODIUM SUCCINATE 125 MG/2ML
125 INJECTION, POWDER, LYOPHILIZED, FOR SOLUTION INTRAMUSCULAR; INTRAVENOUS ONCE
Status: CANCELLED | OUTPATIENT
Start: 2019-01-08 | End: 2019-01-08

## 2019-01-08 RX ADMIN — ATEZOLIZUMAB 1200 MG: 1200 INJECTION, SOLUTION INTRAVENOUS at 10:11

## 2019-01-08 RX ADMIN — SODIUM CHLORIDE: 9 INJECTION, SOLUTION INTRAVENOUS at 10:09

## 2019-01-08 NOTE — PROGRESS NOTES
Pt here for C3D1. Denies any new complaints. Labs drawn and results reviewed. Pt was seen by Dr. Ilana Vega prior to treatment. Pt was treated without incident and d/c'd in stable condition. Pt returns 1/29/19 for C4D1.

## 2019-01-24 ENCOUNTER — APPOINTMENT (OUTPATIENT)
Dept: GENERAL RADIOLOGY | Age: 79
End: 2019-01-24
Payer: MEDICARE

## 2019-01-24 ENCOUNTER — HOSPITAL ENCOUNTER (EMERGENCY)
Age: 79
Discharge: HOME | End: 2019-01-24
Attending: EMERGENCY MEDICINE
Payer: MEDICARE

## 2019-01-24 ENCOUNTER — APPOINTMENT (OUTPATIENT)
Dept: CT IMAGING | Age: 79
End: 2019-01-24
Payer: MEDICARE

## 2019-01-24 VITALS
SYSTOLIC BLOOD PRESSURE: 130 MMHG | OXYGEN SATURATION: 94 % | HEIGHT: 74 IN | HEART RATE: 91 BPM | BODY MASS INDEX: 30.29 KG/M2 | TEMPERATURE: 97.6 F | WEIGHT: 236 LBS | RESPIRATION RATE: 16 BRPM | DIASTOLIC BLOOD PRESSURE: 54 MMHG

## 2019-01-24 DIAGNOSIS — M25.512 ACUTE PAIN OF LEFT SHOULDER: Primary | ICD-10-CM

## 2019-01-24 PROCEDURE — 73200 CT UPPER EXTREMITY W/O DYE: CPT

## 2019-01-24 PROCEDURE — 99284 EMERGENCY DEPT VISIT MOD MDM: CPT

## 2019-01-24 PROCEDURE — 73030 X-RAY EXAM OF SHOULDER: CPT

## 2019-01-24 PROCEDURE — 6370000000 HC RX 637 (ALT 250 FOR IP): Performed by: EMERGENCY MEDICINE

## 2019-01-24 RX ORDER — HYDROCODONE BITARTRATE AND ACETAMINOPHEN 5; 325 MG/1; MG/1
1 TABLET ORAL ONCE
Status: COMPLETED | OUTPATIENT
Start: 2019-01-24 | End: 2019-01-24

## 2019-01-24 RX ADMIN — HYDROCODONE BITARTRATE AND ACETAMINOPHEN 1 TABLET: 5; 325 TABLET ORAL at 10:14

## 2019-01-24 ASSESSMENT — ENCOUNTER SYMPTOMS
WHEEZING: 0
EYE REDNESS: 0
EYE PAIN: 0
CONSTIPATION: 0
TROUBLE SWALLOWING: 0
COUGH: 0
BLOOD IN STOOL: 0
CHEST TIGHTNESS: 0
FACIAL SWELLING: 0
ABDOMINAL PAIN: 0
SINUS PRESSURE: 0
EYE DISCHARGE: 0
COLOR CHANGE: 0
SHORTNESS OF BREATH: 0
DIARRHEA: 0
BACK PAIN: 0
SORE THROAT: 0
VOMITING: 0
NAUSEA: 0
RHINORRHEA: 0

## 2019-01-24 ASSESSMENT — PAIN SCALES - GENERAL
PAINLEVEL_OUTOF10: 10
PAINLEVEL_OUTOF10: 2
PAINLEVEL_OUTOF10: 10

## 2019-01-24 ASSESSMENT — PAIN DESCRIPTION - ORIENTATION: ORIENTATION: LEFT

## 2019-01-24 ASSESSMENT — PAIN DESCRIPTION - PAIN TYPE: TYPE: ACUTE PAIN

## 2019-01-24 ASSESSMENT — PAIN DESCRIPTION - LOCATION: LOCATION: SHOULDER

## 2019-01-29 ENCOUNTER — OFFICE VISIT (OUTPATIENT)
Dept: ONCOLOGY | Age: 79
End: 2019-01-29
Payer: MEDICARE

## 2019-01-29 ENCOUNTER — HOSPITAL ENCOUNTER (OUTPATIENT)
Dept: INFUSION THERAPY | Age: 79
Discharge: HOME | End: 2019-01-29
Payer: MEDICARE

## 2019-01-29 VITALS
DIASTOLIC BLOOD PRESSURE: 65 MMHG | BODY MASS INDEX: 30.11 KG/M2 | WEIGHT: 234.5 LBS | SYSTOLIC BLOOD PRESSURE: 116 MMHG | TEMPERATURE: 97.3 F | HEART RATE: 70 BPM

## 2019-01-29 DIAGNOSIS — C68.9 UROTHELIAL CANCER (HCC): ICD-10-CM

## 2019-01-29 DIAGNOSIS — M25.512 ACUTE PAIN OF LEFT SHOULDER: ICD-10-CM

## 2019-01-29 DIAGNOSIS — Z79.899 LONG TERM USE OF DRUG: ICD-10-CM

## 2019-01-29 DIAGNOSIS — C67.9 MALIGNANT NEOPLASM OF URINARY BLADDER, UNSPECIFIED SITE (HCC): ICD-10-CM

## 2019-01-29 DIAGNOSIS — C67.9 MALIGNANT NEOPLASM OF URINARY BLADDER, UNSPECIFIED SITE (HCC): Primary | ICD-10-CM

## 2019-01-29 LAB
ABSOLUTE EOS #: 0.08 K/UL (ref 0–0.4)
ABSOLUTE IMMATURE GRANULOCYTE: ABNORMAL K/UL (ref 0–0.3)
ABSOLUTE LYMPH #: 0.4 K/UL (ref 1–4.8)
ABSOLUTE MONO #: 2.21 K/UL (ref 0.1–0.8)
ALBUMIN SERPL-MCNC: 3.5 G/DL (ref 3.5–5.2)
ALBUMIN/GLOBULIN RATIO: 0.7 (ref 1–2.5)
ALP BLD-CCNC: 124 U/L (ref 40–129)
ALT SERPL-CCNC: 14 U/L (ref 5–41)
AMYLASE: 53 U/L (ref 28–100)
ANION GAP SERPL CALCULATED.3IONS-SCNC: 11 MMOL/L (ref 9–17)
AST SERPL-CCNC: 25 U/L
BASOPHILS # BLD: 1 % (ref 0–2)
BASOPHILS ABSOLUTE: 0.08 K/UL (ref 0–0.2)
BILIRUB SERPL-MCNC: 0.72 MG/DL (ref 0.3–1.2)
BUN BLDV-MCNC: 14 MG/DL (ref 8–23)
BUN/CREAT BLD: ABNORMAL (ref 9–20)
CALCIUM SERPL-MCNC: 9.3 MG/DL (ref 8.6–10.4)
CHLORIDE BLD-SCNC: 100 MMOL/L (ref 98–107)
CO2: 27 MMOL/L (ref 20–31)
CORTISOL COLLECTION INFO: NORMAL
CORTISOL: 15.2 UG/DL (ref 2.7–18.4)
CREAT SERPL-MCNC: 0.87 MG/DL (ref 0.7–1.2)
DIFFERENTIAL TYPE: ABNORMAL
EOSINOPHILS RELATIVE PERCENT: 1 % (ref 1–4)
GFR AFRICAN AMERICAN: >60 ML/MIN
GFR NON-AFRICAN AMERICAN: >60 ML/MIN
GFR SERPL CREATININE-BSD FRML MDRD: ABNORMAL ML/MIN/{1.73_M2}
GFR SERPL CREATININE-BSD FRML MDRD: ABNORMAL ML/MIN/{1.73_M2}
GLUCOSE BLD-MCNC: 108 MG/DL (ref 70–99)
HCT VFR BLD CALC: 36.5 % (ref 41–53)
HEMOGLOBIN: 12 G/DL (ref 13.5–17.5)
IMMATURE GRANULOCYTES: ABNORMAL %
LIPASE: 11 U/L (ref 13–60)
LYMPHOCYTES # BLD: 5 % (ref 24–44)
MCH RBC QN AUTO: 29.4 PG (ref 26–34)
MCHC RBC AUTO-ENTMCNC: 32.8 G/DL (ref 31–37)
MCV RBC AUTO: 89.9 FL (ref 80–100)
MONOCYTES # BLD: 28 % (ref 1–7)
MORPHOLOGY: NORMAL
NRBC AUTOMATED: ABNORMAL PER 100 WBC
PDW BLD-RTO: 15.1 % (ref 12.5–15.4)
PLATELET # BLD: 236 K/UL (ref 140–450)
PLATELET ESTIMATE: ABNORMAL
PMV BLD AUTO: 8.5 FL (ref 6–12)
POTASSIUM SERPL-SCNC: 4.5 MMOL/L (ref 3.7–5.3)
RBC # BLD: 4.06 M/UL (ref 4.5–5.9)
RBC # BLD: ABNORMAL 10*6/UL
SEG NEUTROPHILS: 65 % (ref 36–66)
SEGMENTED NEUTROPHILS ABSOLUTE COUNT: 5.13 K/UL (ref 1.8–7.7)
SODIUM BLD-SCNC: 138 MMOL/L (ref 135–144)
THYROXINE, FREE: 1 NG/DL (ref 0.93–1.7)
TOTAL PROTEIN: 8.6 G/DL (ref 6.4–8.3)
TSH SERPL DL<=0.05 MIU/L-ACNC: 1.25 MIU/L (ref 0.3–5)
WBC # BLD: 7.9 K/UL (ref 3.5–11)
WBC # BLD: ABNORMAL 10*3/UL

## 2019-01-29 PROCEDURE — 80053 COMPREHEN METABOLIC PANEL: CPT

## 2019-01-29 PROCEDURE — 82150 ASSAY OF AMYLASE: CPT

## 2019-01-29 PROCEDURE — 6360000002 HC RX W HCPCS: Performed by: INTERNAL MEDICINE

## 2019-01-29 PROCEDURE — 84439 ASSAY OF FREE THYROXINE: CPT

## 2019-01-29 PROCEDURE — 36415 COLL VENOUS BLD VENIPUNCTURE: CPT

## 2019-01-29 PROCEDURE — 99215 OFFICE O/P EST HI 40 MIN: CPT | Performed by: INTERNAL MEDICINE

## 2019-01-29 PROCEDURE — 85025 COMPLETE CBC W/AUTO DIFF WBC: CPT

## 2019-01-29 PROCEDURE — 83690 ASSAY OF LIPASE: CPT

## 2019-01-29 PROCEDURE — 2580000003 HC RX 258: Performed by: INTERNAL MEDICINE

## 2019-01-29 PROCEDURE — 96413 CHEMO IV INFUSION 1 HR: CPT

## 2019-01-29 PROCEDURE — 82533 TOTAL CORTISOL: CPT

## 2019-01-29 PROCEDURE — 84443 ASSAY THYROID STIM HORMONE: CPT

## 2019-01-29 RX ORDER — HEPARIN SODIUM (PORCINE) LOCK FLUSH IV SOLN 100 UNIT/ML 100 UNIT/ML
500 SOLUTION INTRAVENOUS PRN
Status: DISCONTINUED | OUTPATIENT
Start: 2019-01-29 | End: 2019-01-30 | Stop reason: HOSPADM

## 2019-01-29 RX ORDER — HEPARIN SODIUM (PORCINE) LOCK FLUSH IV SOLN 100 UNIT/ML 100 UNIT/ML
500 SOLUTION INTRAVENOUS PRN
Status: CANCELLED | OUTPATIENT
Start: 2019-01-29

## 2019-01-29 RX ORDER — DIPHENHYDRAMINE HYDROCHLORIDE 50 MG/ML
50 INJECTION INTRAMUSCULAR; INTRAVENOUS ONCE
Status: CANCELLED | OUTPATIENT
Start: 2019-01-29 | End: 2019-01-29

## 2019-01-29 RX ORDER — SODIUM CHLORIDE 9 MG/ML
INJECTION, SOLUTION INTRAVENOUS ONCE
Status: CANCELLED | OUTPATIENT
Start: 2019-01-29 | End: 2019-01-29

## 2019-01-29 RX ORDER — SODIUM CHLORIDE 0.9 % (FLUSH) 0.9 %
5 SYRINGE (ML) INJECTION PRN
Status: CANCELLED | OUTPATIENT
Start: 2019-01-29

## 2019-01-29 RX ORDER — SODIUM CHLORIDE 0.9 % (FLUSH) 0.9 %
10 SYRINGE (ML) INJECTION PRN
Status: CANCELLED | OUTPATIENT
Start: 2019-01-29

## 2019-01-29 RX ORDER — MEPERIDINE HYDROCHLORIDE 50 MG/ML
12.5 INJECTION INTRAMUSCULAR; INTRAVENOUS; SUBCUTANEOUS ONCE
Status: CANCELLED | OUTPATIENT
Start: 2019-01-29 | End: 2019-01-29

## 2019-01-29 RX ORDER — 0.9 % SODIUM CHLORIDE 0.9 %
10 VIAL (ML) INJECTION ONCE
Status: CANCELLED | OUTPATIENT
Start: 2019-01-29 | End: 2019-01-29

## 2019-01-29 RX ORDER — SODIUM CHLORIDE 9 MG/ML
INJECTION, SOLUTION INTRAVENOUS ONCE
Status: COMPLETED | OUTPATIENT
Start: 2019-01-29 | End: 2019-01-29

## 2019-01-29 RX ORDER — METHYLPREDNISOLONE SODIUM SUCCINATE 125 MG/2ML
125 INJECTION, POWDER, LYOPHILIZED, FOR SOLUTION INTRAMUSCULAR; INTRAVENOUS ONCE
Status: CANCELLED | OUTPATIENT
Start: 2019-01-29 | End: 2019-01-29

## 2019-01-29 RX ORDER — SODIUM CHLORIDE 9 MG/ML
INJECTION, SOLUTION INTRAVENOUS CONTINUOUS
Status: CANCELLED | OUTPATIENT
Start: 2019-01-29

## 2019-01-29 RX ORDER — SODIUM CHLORIDE 0.9 % (FLUSH) 0.9 %
10 SYRINGE (ML) INJECTION PRN
Status: DISCONTINUED | OUTPATIENT
Start: 2019-01-29 | End: 2019-01-30 | Stop reason: HOSPADM

## 2019-01-29 RX ORDER — HYDROCODONE BITARTRATE AND ACETAMINOPHEN 5; 325 MG/1; MG/1
1 TABLET ORAL EVERY 8 HOURS PRN
Qty: 90 TABLET | Refills: 0 | Status: SHIPPED | OUTPATIENT
Start: 2019-01-29 | End: 2019-02-28

## 2019-01-29 RX ADMIN — ATEZOLIZUMAB 1200 MG: 1200 INJECTION, SOLUTION INTRAVENOUS at 10:17

## 2019-01-29 RX ADMIN — SODIUM CHLORIDE: 9 INJECTION, SOLUTION INTRAVENOUS at 10:17

## 2019-01-29 NOTE — PROGRESS NOTES
Pt here for C4D1 Tecentriq. Pt seen by Dr Konstantin Oliver prior to treatment, refer to his note. Labs drawn and results reviewed. Pt was treated without incident and d/c'd in stable condition. Pt will return in 3 weeks for C5D1 and follow up with Dr Konstantin Oliver.

## 2019-02-01 ENCOUNTER — HOSPITAL ENCOUNTER (OUTPATIENT)
Dept: MRI IMAGING | Age: 79
Discharge: HOME | End: 2019-02-03
Payer: MEDICARE

## 2019-02-01 DIAGNOSIS — M25.512 ACUTE PAIN OF LEFT SHOULDER: ICD-10-CM

## 2019-02-04 ENCOUNTER — TELEPHONE (OUTPATIENT)
Dept: ONCOLOGY | Age: 79
End: 2019-02-04

## 2019-02-07 ENCOUNTER — TELEPHONE (OUTPATIENT)
Dept: ONCOLOGY | Age: 79
End: 2019-02-07

## 2019-02-08 ENCOUNTER — HOSPITAL ENCOUNTER (OUTPATIENT)
Dept: MRI IMAGING | Age: 79
Discharge: HOME | End: 2019-02-10
Payer: MEDICARE

## 2019-02-08 VITALS — HEIGHT: 74 IN | WEIGHT: 240 LBS | BODY MASS INDEX: 30.8 KG/M2

## 2019-02-08 PROCEDURE — 6360000004 HC RX CONTRAST MEDICATION: Performed by: INTERNAL MEDICINE

## 2019-02-08 PROCEDURE — A9579 GAD-BASE MR CONTRAST NOS,1ML: HCPCS | Performed by: INTERNAL MEDICINE

## 2019-02-08 PROCEDURE — 2580000003 HC RX 258: Performed by: INTERNAL MEDICINE

## 2019-02-08 PROCEDURE — 73223 MRI JOINT UPR EXTR W/O&W/DYE: CPT

## 2019-02-08 RX ORDER — SODIUM CHLORIDE 0.9 % (FLUSH) 0.9 %
10 SYRINGE (ML) INJECTION PRN
Status: DISCONTINUED | OUTPATIENT
Start: 2019-02-08 | End: 2019-02-11 | Stop reason: HOSPADM

## 2019-02-08 RX ADMIN — Medication 10 ML: at 09:01

## 2019-02-08 RX ADMIN — GADOTERIDOL 20 ML: 279.3 INJECTION, SOLUTION INTRAVENOUS at 09:01

## 2019-02-12 ENCOUNTER — HOSPITAL ENCOUNTER (OUTPATIENT)
Dept: CT IMAGING | Age: 79
Discharge: HOME | End: 2019-02-14
Payer: MEDICARE

## 2019-02-12 DIAGNOSIS — C67.9 MALIGNANT NEOPLASM OF URINARY BLADDER, UNSPECIFIED SITE (HCC): ICD-10-CM

## 2019-02-12 PROCEDURE — 71260 CT THORAX DX C+: CPT

## 2019-02-12 PROCEDURE — 74177 CT ABD & PELVIS W/CONTRAST: CPT

## 2019-02-12 PROCEDURE — 2580000003 HC RX 258: Performed by: FAMILY MEDICINE

## 2019-02-12 PROCEDURE — 6360000004 HC RX CONTRAST MEDICATION: Performed by: FAMILY MEDICINE

## 2019-02-12 RX ORDER — 0.9 % SODIUM CHLORIDE 0.9 %
80 INTRAVENOUS SOLUTION INTRAVENOUS ONCE
Status: COMPLETED | OUTPATIENT
Start: 2019-02-12 | End: 2019-02-12

## 2019-02-12 RX ORDER — SODIUM CHLORIDE 0.9 % (FLUSH) 0.9 %
10 SYRINGE (ML) INJECTION PRN
Status: DISCONTINUED | OUTPATIENT
Start: 2019-02-12 | End: 2019-02-15 | Stop reason: HOSPADM

## 2019-02-12 RX ADMIN — IOVERSOL 100 ML: 741 INJECTION INTRA-ARTERIAL; INTRAVENOUS at 10:04

## 2019-02-12 RX ADMIN — SODIUM CHLORIDE 80 ML: 9 INJECTION, SOLUTION INTRAVENOUS at 10:04

## 2019-02-12 RX ADMIN — IOHEXOL 50 ML: 240 INJECTION, SOLUTION INTRATHECAL; INTRAVASCULAR; INTRAVENOUS; ORAL at 10:05

## 2019-02-12 RX ADMIN — Medication 10 ML: at 10:04

## 2019-02-19 ENCOUNTER — HOSPITAL ENCOUNTER (OUTPATIENT)
Dept: INFUSION THERAPY | Age: 79
Discharge: HOME | End: 2019-02-19
Payer: MEDICARE

## 2019-02-19 ENCOUNTER — OFFICE VISIT (OUTPATIENT)
Dept: ONCOLOGY | Age: 79
End: 2019-02-19
Payer: MEDICARE

## 2019-02-19 VITALS
HEART RATE: 90 BPM | BODY MASS INDEX: 28.63 KG/M2 | SYSTOLIC BLOOD PRESSURE: 104 MMHG | DIASTOLIC BLOOD PRESSURE: 56 MMHG | TEMPERATURE: 98.7 F | WEIGHT: 223 LBS

## 2019-02-19 DIAGNOSIS — C67.9 MALIGNANT NEOPLASM OF URINARY BLADDER, UNSPECIFIED SITE (HCC): ICD-10-CM

## 2019-02-19 DIAGNOSIS — C68.9 UROTHELIAL CANCER (HCC): ICD-10-CM

## 2019-02-19 LAB
ABSOLUTE EOS #: 0.11 K/UL (ref 0–0.4)
ABSOLUTE IMMATURE GRANULOCYTE: ABNORMAL K/UL (ref 0–0.3)
ABSOLUTE LYMPH #: 1.79 K/UL (ref 1–4.8)
ABSOLUTE MONO #: 2.13 K/UL (ref 0.1–0.8)
ALBUMIN SERPL-MCNC: 3.1 G/DL (ref 3.5–5.2)
ALBUMIN/GLOBULIN RATIO: 0.6 (ref 1–2.5)
ALP BLD-CCNC: 137 U/L (ref 40–129)
ALT SERPL-CCNC: 28 U/L (ref 5–41)
ANION GAP SERPL CALCULATED.3IONS-SCNC: 11 MMOL/L (ref 9–17)
AST SERPL-CCNC: 34 U/L
BASOPHILS # BLD: 0 % (ref 0–2)
BASOPHILS ABSOLUTE: 0 K/UL (ref 0–0.2)
BILIRUB SERPL-MCNC: 0.8 MG/DL (ref 0.3–1.2)
BUN BLDV-MCNC: 14 MG/DL (ref 8–23)
BUN/CREAT BLD: ABNORMAL (ref 9–20)
CALCIUM SERPL-MCNC: 9.5 MG/DL (ref 8.6–10.4)
CHLORIDE BLD-SCNC: 98 MMOL/L (ref 98–107)
CO2: 30 MMOL/L (ref 20–31)
CREAT SERPL-MCNC: 0.85 MG/DL (ref 0.7–1.2)
DIFFERENTIAL TYPE: ABNORMAL
EOSINOPHILS RELATIVE PERCENT: 1 % (ref 1–4)
GFR AFRICAN AMERICAN: >60 ML/MIN
GFR NON-AFRICAN AMERICAN: >60 ML/MIN
GFR SERPL CREATININE-BSD FRML MDRD: ABNORMAL ML/MIN/{1.73_M2}
GFR SERPL CREATININE-BSD FRML MDRD: ABNORMAL ML/MIN/{1.73_M2}
GLUCOSE BLD-MCNC: 108 MG/DL (ref 70–99)
HCT VFR BLD CALC: 37.8 % (ref 41–53)
HEMOGLOBIN: 11.6 G/DL (ref 13.5–17.5)
IMMATURE GRANULOCYTES: ABNORMAL %
LYMPHOCYTES # BLD: 16 % (ref 24–44)
MCH RBC QN AUTO: 28.2 PG (ref 26–34)
MCHC RBC AUTO-ENTMCNC: 30.7 G/DL (ref 31–37)
MCV RBC AUTO: 92 FL (ref 80–100)
MONOCYTES # BLD: 19 % (ref 1–7)
MORPHOLOGY: ABNORMAL
NRBC AUTOMATED: ABNORMAL PER 100 WBC
PDW BLD-RTO: 15 % (ref 12.5–15.4)
PLATELET # BLD: 239 K/UL (ref 140–450)
PLATELET ESTIMATE: ABNORMAL
PMV BLD AUTO: 10.3 FL (ref 8–14)
POTASSIUM SERPL-SCNC: 4.4 MMOL/L (ref 3.7–5.3)
RBC # BLD: 4.11 M/UL (ref 4.5–5.9)
RBC # BLD: ABNORMAL 10*6/UL
SEG NEUTROPHILS: 64 % (ref 36–66)
SEGMENTED NEUTROPHILS ABSOLUTE COUNT: 7.17 K/UL (ref 1.8–7.7)
SODIUM BLD-SCNC: 139 MMOL/L (ref 135–144)
TOTAL PROTEIN: 8.2 G/DL (ref 6.4–8.3)
WBC # BLD: 11.2 K/UL (ref 3.5–11)
WBC # BLD: ABNORMAL 10*3/UL

## 2019-02-19 PROCEDURE — 36415 COLL VENOUS BLD VENIPUNCTURE: CPT

## 2019-02-19 PROCEDURE — 99211 OFF/OP EST MAY X REQ PHY/QHP: CPT | Performed by: INTERNAL MEDICINE

## 2019-02-19 PROCEDURE — 99215 OFFICE O/P EST HI 40 MIN: CPT | Performed by: INTERNAL MEDICINE

## 2019-02-19 PROCEDURE — 85025 COMPLETE CBC W/AUTO DIFF WBC: CPT

## 2019-02-19 PROCEDURE — 80053 COMPREHEN METABOLIC PANEL: CPT

## 2019-03-04 ENCOUNTER — TELEPHONE (OUTPATIENT)
Dept: INFUSION THERAPY | Age: 79
End: 2019-03-04

## 2019-03-05 ENCOUNTER — HOSPITAL ENCOUNTER (OUTPATIENT)
Dept: INFUSION THERAPY | Age: 79
Discharge: HOME | End: 2019-03-05
Payer: MEDICARE

## 2019-03-05 ENCOUNTER — OFFICE VISIT (OUTPATIENT)
Dept: ONCOLOGY | Age: 79
End: 2019-03-05
Payer: MEDICARE

## 2019-03-05 VITALS
HEART RATE: 98 BPM | DIASTOLIC BLOOD PRESSURE: 68 MMHG | SYSTOLIC BLOOD PRESSURE: 115 MMHG | TEMPERATURE: 97.5 F | RESPIRATION RATE: 16 BRPM

## 2019-03-05 VITALS
DIASTOLIC BLOOD PRESSURE: 68 MMHG | TEMPERATURE: 97.5 F | SYSTOLIC BLOOD PRESSURE: 113 MMHG | WEIGHT: 217 LBS | RESPIRATION RATE: 18 BRPM | BODY MASS INDEX: 27.86 KG/M2 | HEART RATE: 98 BPM

## 2019-03-05 DIAGNOSIS — C68.9 UROTHELIAL CANCER (HCC): Primary | ICD-10-CM

## 2019-03-05 DIAGNOSIS — C67.9 MALIGNANT NEOPLASM OF URINARY BLADDER, UNSPECIFIED SITE (HCC): ICD-10-CM

## 2019-03-05 DIAGNOSIS — Z79.899 LONG TERM USE OF DRUG: ICD-10-CM

## 2019-03-05 DIAGNOSIS — C67.9 MALIGNANT NEOPLASM OF URINARY BLADDER, UNSPECIFIED SITE (HCC): Primary | ICD-10-CM

## 2019-03-05 LAB
ABSOLUTE EOS #: 0.26 K/UL (ref 0–0.4)
ABSOLUTE IMMATURE GRANULOCYTE: ABNORMAL K/UL (ref 0–0.3)
ABSOLUTE LYMPH #: 0.85 K/UL (ref 1–4.8)
ABSOLUTE MONO #: 1.53 K/UL (ref 0.1–0.8)
ALBUMIN SERPL-MCNC: 2.8 G/DL (ref 3.5–5.2)
ALBUMIN/GLOBULIN RATIO: 0.5 (ref 1–2.5)
ALP BLD-CCNC: 162 U/L (ref 40–129)
ALT SERPL-CCNC: 27 U/L (ref 5–41)
AMYLASE: 44 U/L (ref 28–100)
ANION GAP SERPL CALCULATED.3IONS-SCNC: 13 MMOL/L (ref 9–17)
AST SERPL-CCNC: 33 U/L
BASOPHILS # BLD: 1 % (ref 0–2)
BASOPHILS ABSOLUTE: 0.09 K/UL (ref 0–0.2)
BILIRUB SERPL-MCNC: 0.84 MG/DL (ref 0.3–1.2)
BUN BLDV-MCNC: 11 MG/DL (ref 8–23)
BUN/CREAT BLD: ABNORMAL (ref 9–20)
CALCIUM SERPL-MCNC: 8.9 MG/DL (ref 8.6–10.4)
CHLORIDE BLD-SCNC: 99 MMOL/L (ref 98–107)
CO2: 27 MMOL/L (ref 20–31)
CORTISOL COLLECTION INFO: ABNORMAL
CORTISOL: 20.9 UG/DL (ref 2.7–18.4)
CREAT SERPL-MCNC: 0.87 MG/DL (ref 0.7–1.2)
DIFFERENTIAL TYPE: ABNORMAL
EOSINOPHILS RELATIVE PERCENT: 3 % (ref 1–4)
GFR AFRICAN AMERICAN: >60 ML/MIN
GFR NON-AFRICAN AMERICAN: >60 ML/MIN
GFR SERPL CREATININE-BSD FRML MDRD: ABNORMAL ML/MIN/{1.73_M2}
GFR SERPL CREATININE-BSD FRML MDRD: ABNORMAL ML/MIN/{1.73_M2}
GLUCOSE BLD-MCNC: 108 MG/DL (ref 70–99)
HCT VFR BLD CALC: 38 % (ref 41–53)
HEMOGLOBIN: 11.8 G/DL (ref 13.5–17.5)
IMMATURE GRANULOCYTES: ABNORMAL %
LIPASE: 9 U/L (ref 13–60)
LYMPHOCYTES # BLD: 10 % (ref 24–44)
MCH RBC QN AUTO: 28.2 PG (ref 26–34)
MCHC RBC AUTO-ENTMCNC: 31.1 G/DL (ref 31–37)
MCV RBC AUTO: 90.9 FL (ref 80–100)
MONOCYTES # BLD: 18 % (ref 1–7)
MORPHOLOGY: NORMAL
NRBC AUTOMATED: ABNORMAL PER 100 WBC
PDW BLD-RTO: 15.4 % (ref 12.5–15.4)
PLATELET # BLD: 216 K/UL (ref 140–450)
PLATELET ESTIMATE: ABNORMAL
PMV BLD AUTO: 10.3 FL (ref 8–14)
POTASSIUM SERPL-SCNC: 3.9 MMOL/L (ref 3.7–5.3)
RBC # BLD: 4.18 M/UL (ref 4.5–5.9)
RBC # BLD: ABNORMAL 10*6/UL
SEG NEUTROPHILS: 68 % (ref 36–66)
SEGMENTED NEUTROPHILS ABSOLUTE COUNT: 5.77 K/UL (ref 1.8–7.7)
SODIUM BLD-SCNC: 139 MMOL/L (ref 135–144)
THYROXINE, FREE: 1.07 NG/DL (ref 0.93–1.7)
TOTAL PROTEIN: 7.9 G/DL (ref 6.4–8.3)
TSH SERPL DL<=0.05 MIU/L-ACNC: 1.24 MIU/L (ref 0.3–5)
WBC # BLD: 8.5 K/UL (ref 3.5–11)
WBC # BLD: ABNORMAL 10*3/UL

## 2019-03-05 PROCEDURE — 2580000003 HC RX 258: Performed by: INTERNAL MEDICINE

## 2019-03-05 PROCEDURE — 96413 CHEMO IV INFUSION 1 HR: CPT

## 2019-03-05 PROCEDURE — 82150 ASSAY OF AMYLASE: CPT

## 2019-03-05 PROCEDURE — 6360000002 HC RX W HCPCS: Performed by: INTERNAL MEDICINE

## 2019-03-05 PROCEDURE — 84439 ASSAY OF FREE THYROXINE: CPT

## 2019-03-05 PROCEDURE — 80053 COMPREHEN METABOLIC PANEL: CPT

## 2019-03-05 PROCEDURE — 83690 ASSAY OF LIPASE: CPT

## 2019-03-05 PROCEDURE — 36415 COLL VENOUS BLD VENIPUNCTURE: CPT

## 2019-03-05 PROCEDURE — 85025 COMPLETE CBC W/AUTO DIFF WBC: CPT

## 2019-03-05 PROCEDURE — 99215 OFFICE O/P EST HI 40 MIN: CPT | Performed by: INTERNAL MEDICINE

## 2019-03-05 PROCEDURE — 82533 TOTAL CORTISOL: CPT

## 2019-03-05 PROCEDURE — 84443 ASSAY THYROID STIM HORMONE: CPT

## 2019-03-05 RX ORDER — MEPERIDINE HYDROCHLORIDE 50 MG/ML
12.5 INJECTION INTRAMUSCULAR; INTRAVENOUS; SUBCUTANEOUS ONCE
Status: CANCELLED | OUTPATIENT
Start: 2019-03-05 | End: 2019-03-05

## 2019-03-05 RX ORDER — SODIUM CHLORIDE 9 MG/ML
INJECTION, SOLUTION INTRAVENOUS ONCE
Status: COMPLETED | OUTPATIENT
Start: 2019-03-05 | End: 2019-03-05

## 2019-03-05 RX ORDER — EPINEPHRINE 1 MG/ML
0.3 INJECTION, SOLUTION, CONCENTRATE INTRAVENOUS PRN
Status: CANCELLED | OUTPATIENT
Start: 2019-03-05

## 2019-03-05 RX ORDER — 0.9 % SODIUM CHLORIDE 0.9 %
10 VIAL (ML) INJECTION ONCE
Status: CANCELLED | OUTPATIENT
Start: 2019-03-05 | End: 2019-03-05

## 2019-03-05 RX ORDER — SODIUM CHLORIDE 0.9 % (FLUSH) 0.9 %
5 SYRINGE (ML) INJECTION PRN
Status: CANCELLED | OUTPATIENT
Start: 2019-03-05

## 2019-03-05 RX ORDER — DIPHENHYDRAMINE HYDROCHLORIDE 50 MG/ML
50 INJECTION INTRAMUSCULAR; INTRAVENOUS ONCE
Status: CANCELLED | OUTPATIENT
Start: 2019-03-05 | End: 2019-03-05

## 2019-03-05 RX ORDER — HEPARIN SODIUM (PORCINE) LOCK FLUSH IV SOLN 100 UNIT/ML 100 UNIT/ML
500 SOLUTION INTRAVENOUS PRN
Status: CANCELLED | OUTPATIENT
Start: 2019-03-05

## 2019-03-05 RX ORDER — SODIUM CHLORIDE 0.9 % (FLUSH) 0.9 %
10 SYRINGE (ML) INJECTION PRN
Status: CANCELLED | OUTPATIENT
Start: 2019-03-05

## 2019-03-05 RX ORDER — METHYLPREDNISOLONE SODIUM SUCCINATE 125 MG/2ML
125 INJECTION, POWDER, LYOPHILIZED, FOR SOLUTION INTRAMUSCULAR; INTRAVENOUS ONCE
Status: CANCELLED | OUTPATIENT
Start: 2019-03-05 | End: 2019-03-05

## 2019-03-05 RX ORDER — SODIUM CHLORIDE 9 MG/ML
INJECTION, SOLUTION INTRAVENOUS CONTINUOUS
Status: CANCELLED | OUTPATIENT
Start: 2019-03-05

## 2019-03-05 RX ADMIN — SODIUM CHLORIDE 200 MG: 9 INJECTION, SOLUTION INTRAVENOUS at 09:21

## 2019-03-05 RX ADMIN — SODIUM CHLORIDE: 9 INJECTION, SOLUTION INTRAVENOUS at 09:22

## 2019-03-05 NOTE — PROGRESS NOTES
Patient here for [address] materials given to patient/family. Consent signed. Dr. Rocky Sewell saw patient see his dictation. Patient tolerated treatment well and was discharged home with family in stable condition.

## 2019-03-05 NOTE — PLAN OF CARE
Problem: SAFETY  Goal: Free from accidental physical injury  3/5/2019 1118 by Sima Saenz RN  Outcome: Met This Shift  3/5/2019 1118 by Sima Saenz RN  Reactivated  3/5/2019 0954 by Sima Saenz RN  Outcome: Completed  3/5/2019 0825 by Sima Saenz RN  Outcome: Met This Shift

## 2019-03-22 ENCOUNTER — HOSPITAL ENCOUNTER (OUTPATIENT)
Age: 79
Discharge: HOME | End: 2019-03-24
Payer: MEDICARE

## 2019-03-22 ENCOUNTER — HOSPITAL ENCOUNTER (OUTPATIENT)
Dept: GENERAL RADIOLOGY | Age: 79
Discharge: HOME | End: 2019-03-24
Payer: MEDICARE

## 2019-03-22 DIAGNOSIS — J91.8 PLEURAL EFFUSION IN OTHER CONDITIONS CLASSIFIED ELSEWHERE: ICD-10-CM

## 2019-03-22 DIAGNOSIS — R31.0 GROSS HEMATURIA: ICD-10-CM

## 2019-03-22 DIAGNOSIS — C65.1 RENAL PELVIS TRANSITIONAL CELL MALIGNANT NEOPLASM, RIGHT (HCC): ICD-10-CM

## 2019-03-22 PROCEDURE — 71046 X-RAY EXAM CHEST 2 VIEWS: CPT

## 2019-03-26 ENCOUNTER — HOSPITAL ENCOUNTER (OUTPATIENT)
Dept: INFUSION THERAPY | Age: 79
Discharge: HOME | End: 2019-03-26
Payer: MEDICARE

## 2019-03-26 VITALS
HEART RATE: 97 BPM | DIASTOLIC BLOOD PRESSURE: 66 MMHG | SYSTOLIC BLOOD PRESSURE: 105 MMHG | TEMPERATURE: 97.2 F | RESPIRATION RATE: 18 BRPM

## 2019-03-26 DIAGNOSIS — C68.9 UROTHELIAL CANCER (HCC): Primary | ICD-10-CM

## 2019-03-26 DIAGNOSIS — C67.9 MALIGNANT NEOPLASM OF URINARY BLADDER, UNSPECIFIED SITE (HCC): ICD-10-CM

## 2019-03-26 LAB
-: NORMAL
ABSOLUTE EOS #: 0.18 K/UL (ref 0–0.4)
ABSOLUTE IMMATURE GRANULOCYTE: ABNORMAL K/UL (ref 0–0.3)
ABSOLUTE LYMPH #: 0.72 K/UL (ref 1–4.8)
ABSOLUTE MONO #: 1.44 K/UL (ref 0.1–0.8)
ALBUMIN SERPL-MCNC: 3 G/DL (ref 3.5–5.2)
ALBUMIN/GLOBULIN RATIO: 0.6 (ref 1–2.5)
ALP BLD-CCNC: 149 U/L (ref 40–129)
ALT SERPL-CCNC: 13 U/L (ref 5–41)
ANION GAP SERPL CALCULATED.3IONS-SCNC: 13 MMOL/L (ref 9–17)
AST SERPL-CCNC: 27 U/L
BASOPHILS # BLD: 0 % (ref 0–2)
BASOPHILS ABSOLUTE: 0 K/UL (ref 0–0.2)
BILIRUB SERPL-MCNC: 0.75 MG/DL (ref 0.3–1.2)
BUN BLDV-MCNC: 14 MG/DL (ref 8–23)
BUN/CREAT BLD: ABNORMAL (ref 9–20)
CALCIUM SERPL-MCNC: 8.6 MG/DL (ref 8.6–10.4)
CHLORIDE BLD-SCNC: 98 MMOL/L (ref 98–107)
CO2: 27 MMOL/L (ref 20–31)
CREAT SERPL-MCNC: 1.01 MG/DL (ref 0.7–1.2)
DIFFERENTIAL TYPE: ABNORMAL
EOSINOPHILS RELATIVE PERCENT: 2 % (ref 1–4)
GFR AFRICAN AMERICAN: >60 ML/MIN
GFR NON-AFRICAN AMERICAN: >60 ML/MIN
GFR SERPL CREATININE-BSD FRML MDRD: ABNORMAL ML/MIN/{1.73_M2}
GFR SERPL CREATININE-BSD FRML MDRD: ABNORMAL ML/MIN/{1.73_M2}
GLUCOSE BLD-MCNC: 120 MG/DL (ref 70–99)
HCT VFR BLD CALC: 37.3 % (ref 41–53)
HEMOGLOBIN: 12.2 G/DL (ref 13.5–17.5)
IMMATURE GRANULOCYTES: ABNORMAL %
LYMPHOCYTES # BLD: 8 % (ref 24–44)
MCH RBC QN AUTO: 28.4 PG (ref 26–34)
MCHC RBC AUTO-ENTMCNC: 32.8 G/DL (ref 31–37)
MCV RBC AUTO: 86.7 FL (ref 80–100)
MONOCYTES # BLD: 16 % (ref 1–7)
MORPHOLOGY: NORMAL
NRBC AUTOMATED: ABNORMAL PER 100 WBC
PDW BLD-RTO: 16.3 % (ref 12.5–15.4)
PLATELET # BLD: 237 K/UL (ref 140–450)
PLATELET ESTIMATE: ABNORMAL
PMV BLD AUTO: 8.8 FL (ref 6–12)
POTASSIUM SERPL-SCNC: 3.4 MMOL/L (ref 3.7–5.3)
RBC # BLD: 4.3 M/UL (ref 4.5–5.9)
RBC # BLD: ABNORMAL 10*6/UL
REASON FOR REJECTION: NORMAL
SEG NEUTROPHILS: 74 % (ref 36–66)
SEGMENTED NEUTROPHILS ABSOLUTE COUNT: 6.66 K/UL (ref 1.8–7.7)
SODIUM BLD-SCNC: 138 MMOL/L (ref 135–144)
TOTAL PROTEIN: 8.1 G/DL (ref 6.4–8.3)
WBC # BLD: 9 K/UL (ref 3.5–11)
WBC # BLD: ABNORMAL 10*3/UL
ZZ NTE CLEAN UP: ORDERED TEST: NORMAL
ZZ NTE WITH NAME CLEAN UP: SPECIMEN SOURCE: NORMAL

## 2019-03-26 PROCEDURE — 80053 COMPREHEN METABOLIC PANEL: CPT

## 2019-03-26 PROCEDURE — 96413 CHEMO IV INFUSION 1 HR: CPT

## 2019-03-26 PROCEDURE — 85025 COMPLETE CBC W/AUTO DIFF WBC: CPT

## 2019-03-26 PROCEDURE — 6360000002 HC RX W HCPCS: Performed by: INTERNAL MEDICINE

## 2019-03-26 PROCEDURE — 6370000000 HC RX 637 (ALT 250 FOR IP): Performed by: INTERNAL MEDICINE

## 2019-03-26 PROCEDURE — 36415 COLL VENOUS BLD VENIPUNCTURE: CPT

## 2019-03-26 PROCEDURE — 2580000003 HC RX 258: Performed by: INTERNAL MEDICINE

## 2019-03-26 RX ORDER — SODIUM CHLORIDE 0.9 % (FLUSH) 0.9 %
10 SYRINGE (ML) INJECTION PRN
Status: DISCONTINUED | OUTPATIENT
Start: 2019-03-26 | End: 2019-03-27 | Stop reason: HOSPADM

## 2019-03-26 RX ORDER — 0.9 % SODIUM CHLORIDE 0.9 %
10 VIAL (ML) INJECTION ONCE
Status: CANCELLED | OUTPATIENT
Start: 2019-03-26 | End: 2019-03-26

## 2019-03-26 RX ORDER — MEPERIDINE HYDROCHLORIDE 50 MG/ML
12.5 INJECTION INTRAMUSCULAR; INTRAVENOUS; SUBCUTANEOUS ONCE
Status: CANCELLED | OUTPATIENT
Start: 2019-03-26 | End: 2019-03-26

## 2019-03-26 RX ORDER — HEPARIN SODIUM (PORCINE) LOCK FLUSH IV SOLN 100 UNIT/ML 100 UNIT/ML
500 SOLUTION INTRAVENOUS PRN
Status: DISCONTINUED | OUTPATIENT
Start: 2019-03-26 | End: 2019-03-27 | Stop reason: HOSPADM

## 2019-03-26 RX ORDER — EPINEPHRINE 1 MG/ML
0.3 INJECTION, SOLUTION, CONCENTRATE INTRAVENOUS PRN
Status: CANCELLED | OUTPATIENT
Start: 2019-03-26

## 2019-03-26 RX ORDER — METHYLPREDNISOLONE SODIUM SUCCINATE 125 MG/2ML
125 INJECTION, POWDER, LYOPHILIZED, FOR SOLUTION INTRAMUSCULAR; INTRAVENOUS ONCE
Status: CANCELLED | OUTPATIENT
Start: 2019-03-26 | End: 2019-03-26

## 2019-03-26 RX ORDER — DIPHENHYDRAMINE HYDROCHLORIDE 50 MG/ML
50 INJECTION INTRAMUSCULAR; INTRAVENOUS ONCE
Status: CANCELLED | OUTPATIENT
Start: 2019-03-26 | End: 2019-03-26

## 2019-03-26 RX ORDER — SODIUM CHLORIDE 0.9 % (FLUSH) 0.9 %
5 SYRINGE (ML) INJECTION PRN
Status: CANCELLED | OUTPATIENT
Start: 2019-03-26

## 2019-03-26 RX ORDER — SODIUM CHLORIDE 9 MG/ML
INJECTION, SOLUTION INTRAVENOUS ONCE
Status: COMPLETED | OUTPATIENT
Start: 2019-03-26 | End: 2019-03-26

## 2019-03-26 RX ORDER — SODIUM CHLORIDE 9 MG/ML
INJECTION, SOLUTION INTRAVENOUS CONTINUOUS
Status: CANCELLED | OUTPATIENT
Start: 2019-03-26

## 2019-03-26 RX ORDER — POTASSIUM CHLORIDE 20MEQ/15ML
40 LIQUID (ML) ORAL ONCE
Status: COMPLETED | OUTPATIENT
Start: 2019-03-26 | End: 2019-03-26

## 2019-03-26 RX ADMIN — POTASSIUM CHLORIDE 40 MEQ: 40 SOLUTION ORAL at 15:36

## 2019-03-26 RX ADMIN — SODIUM CHLORIDE 200 MG: 9 INJECTION, SOLUTION INTRAVENOUS at 15:47

## 2019-03-26 RX ADMIN — SODIUM CHLORIDE: 9 INJECTION, SOLUTION INTRAVENOUS at 15:34

## 2019-03-26 NOTE — PROGRESS NOTES
Pt here for C2D1. Denies any new complaints. Lab results reviewed. Pt was treated without incident and d/c'd in stable condition. Pt will return on 4/2 for office visit.

## 2019-04-08 DIAGNOSIS — N40.0 BPH WITHOUT OBSTRUCTION/LOWER URINARY TRACT SYMPTOMS: ICD-10-CM

## 2019-04-09 RX ORDER — FINASTERIDE 5 MG/1
TABLET, FILM COATED ORAL
Qty: 30 TABLET | Refills: 10 | Status: SHIPPED | OUTPATIENT
Start: 2019-04-09

## 2019-04-16 ENCOUNTER — OFFICE VISIT (OUTPATIENT)
Dept: ONCOLOGY | Age: 79
End: 2019-04-16
Payer: MEDICARE

## 2019-04-16 ENCOUNTER — HOSPITAL ENCOUNTER (OUTPATIENT)
Dept: GENERAL RADIOLOGY | Age: 79
Discharge: HOME | End: 2019-04-18
Payer: MEDICARE

## 2019-04-16 ENCOUNTER — HOSPITAL ENCOUNTER (OUTPATIENT)
Dept: INFUSION THERAPY | Age: 79
Discharge: HOME | End: 2019-04-16
Payer: MEDICARE

## 2019-04-16 ENCOUNTER — HOSPITAL ENCOUNTER (OUTPATIENT)
Age: 79
Discharge: HOME | End: 2019-04-18
Payer: MEDICARE

## 2019-04-16 VITALS
WEIGHT: 193 LBS | SYSTOLIC BLOOD PRESSURE: 82 MMHG | HEART RATE: 82 BPM | BODY MASS INDEX: 24.78 KG/M2 | DIASTOLIC BLOOD PRESSURE: 52 MMHG

## 2019-04-16 DIAGNOSIS — J90 PLEURAL EFFUSION: ICD-10-CM

## 2019-04-16 DIAGNOSIS — C68.9 UROTHELIAL CANCER (HCC): Primary | ICD-10-CM

## 2019-04-16 DIAGNOSIS — C67.9 MALIGNANT NEOPLASM OF URINARY BLADDER, UNSPECIFIED SITE (HCC): Primary | ICD-10-CM

## 2019-04-16 DIAGNOSIS — C67.9 MALIGNANT NEOPLASM OF URINARY BLADDER, UNSPECIFIED SITE (HCC): ICD-10-CM

## 2019-04-16 LAB
-: ABNORMAL
ABSOLUTE EOS #: 0 K/UL (ref 0–0.4)
ABSOLUTE IMMATURE GRANULOCYTE: ABNORMAL K/UL (ref 0–0.3)
ABSOLUTE LYMPH #: 0.6 K/UL (ref 1–4.8)
ABSOLUTE MONO #: 1.5 K/UL (ref 0.1–1.2)
ALBUMIN SERPL-MCNC: 3 G/DL (ref 3.5–5.2)
ALBUMIN/GLOBULIN RATIO: 0.6 (ref 1–2.5)
ALP BLD-CCNC: 124 U/L (ref 40–129)
ALT SERPL-CCNC: 14 U/L (ref 5–41)
AMORPHOUS: ABNORMAL
ANION GAP SERPL CALCULATED.3IONS-SCNC: 14 MMOL/L (ref 9–17)
AST SERPL-CCNC: 25 U/L
BACTERIA: ABNORMAL
BASOPHILS # BLD: 1 % (ref 0–2)
BASOPHILS ABSOLUTE: 0 K/UL (ref 0–0.2)
BILIRUB SERPL-MCNC: 0.69 MG/DL (ref 0.3–1.2)
BILIRUBIN URINE: NEGATIVE
BUN BLDV-MCNC: 12 MG/DL (ref 8–23)
BUN/CREAT BLD: ABNORMAL (ref 9–20)
CALCIUM SERPL-MCNC: 8.5 MG/DL (ref 8.6–10.4)
CASTS UA: ABNORMAL /LPF
CHLORIDE BLD-SCNC: 98 MMOL/L (ref 98–107)
CO2: 26 MMOL/L (ref 20–31)
COLOR: YELLOW
COMMENT UA: ABNORMAL
CREAT SERPL-MCNC: 0.99 MG/DL (ref 0.7–1.2)
CRYSTALS, UA: ABNORMAL /HPF
DIFFERENTIAL TYPE: ABNORMAL
EOSINOPHILS RELATIVE PERCENT: 1 % (ref 1–4)
EPITHELIAL CELLS UA: ABNORMAL /HPF (ref 0–5)
GFR AFRICAN AMERICAN: >60 ML/MIN
GFR NON-AFRICAN AMERICAN: >60 ML/MIN
GFR SERPL CREATININE-BSD FRML MDRD: ABNORMAL ML/MIN/{1.73_M2}
GFR SERPL CREATININE-BSD FRML MDRD: ABNORMAL ML/MIN/{1.73_M2}
GLUCOSE BLD-MCNC: 102 MG/DL (ref 70–99)
GLUCOSE URINE: NEGATIVE
HCT VFR BLD CALC: 36.7 % (ref 41–53)
HEMOGLOBIN: 12 G/DL (ref 13.5–17.5)
IMMATURE GRANULOCYTES: ABNORMAL %
KETONES, URINE: NEGATIVE
LEUKOCYTE ESTERASE, URINE: ABNORMAL
LYMPHOCYTES # BLD: 8 % (ref 24–44)
MCH RBC QN AUTO: 28.8 PG (ref 26–34)
MCHC RBC AUTO-ENTMCNC: 32.7 G/DL (ref 31–37)
MCV RBC AUTO: 88.1 FL (ref 80–100)
MONOCYTES # BLD: 19 % (ref 2–11)
MUCUS: ABNORMAL
NITRITE, URINE: POSITIVE
NRBC AUTOMATED: ABNORMAL PER 100 WBC
OTHER OBSERVATIONS UA: ABNORMAL
PDW BLD-RTO: 17.4 % (ref 12.5–15.4)
PH UA: 6.5 (ref 5–8)
PLATELET # BLD: 241 K/UL (ref 140–450)
PLATELET ESTIMATE: ABNORMAL
PMV BLD AUTO: 8.3 FL (ref 6–12)
POTASSIUM SERPL-SCNC: 3.9 MMOL/L (ref 3.7–5.3)
PROTEIN UA: ABNORMAL
RBC # BLD: 4.17 M/UL (ref 4.5–5.9)
RBC # BLD: ABNORMAL 10*6/UL
RBC UA: ABNORMAL /HPF (ref 0–2)
RENAL EPITHELIAL, UA: ABNORMAL /HPF
SEG NEUTROPHILS: 71 % (ref 36–66)
SEGMENTED NEUTROPHILS ABSOLUTE COUNT: 5.5 K/UL (ref 1.8–7.7)
SODIUM BLD-SCNC: 138 MMOL/L (ref 135–144)
SPECIFIC GRAVITY UA: 1.01 (ref 1–1.03)
TOTAL PROTEIN: 8.4 G/DL (ref 6.4–8.3)
TRICHOMONAS: ABNORMAL
TURBIDITY: ABNORMAL
URINE HGB: ABNORMAL
UROBILINOGEN, URINE: NORMAL
WBC # BLD: 7.7 K/UL (ref 3.5–11)
WBC # BLD: ABNORMAL 10*3/UL
WBC UA: ABNORMAL /HPF (ref 0–5)
YEAST: ABNORMAL

## 2019-04-16 PROCEDURE — 85025 COMPLETE CBC W/AUTO DIFF WBC: CPT

## 2019-04-16 PROCEDURE — 81001 URINALYSIS AUTO W/SCOPE: CPT

## 2019-04-16 PROCEDURE — 96413 CHEMO IV INFUSION 1 HR: CPT

## 2019-04-16 PROCEDURE — 6360000002 HC RX W HCPCS: Performed by: INTERNAL MEDICINE

## 2019-04-16 PROCEDURE — 2580000003 HC RX 258: Performed by: INTERNAL MEDICINE

## 2019-04-16 PROCEDURE — 80053 COMPREHEN METABOLIC PANEL: CPT

## 2019-04-16 PROCEDURE — 36415 COLL VENOUS BLD VENIPUNCTURE: CPT

## 2019-04-16 PROCEDURE — 71046 X-RAY EXAM CHEST 2 VIEWS: CPT

## 2019-04-16 PROCEDURE — 99215 OFFICE O/P EST HI 40 MIN: CPT | Performed by: INTERNAL MEDICINE

## 2019-04-16 PROCEDURE — 87086 URINE CULTURE/COLONY COUNT: CPT

## 2019-04-16 RX ORDER — MEPERIDINE HYDROCHLORIDE 50 MG/ML
12.5 INJECTION INTRAMUSCULAR; INTRAVENOUS; SUBCUTANEOUS ONCE
Status: CANCELLED | OUTPATIENT
Start: 2019-04-16

## 2019-04-16 RX ORDER — CIPROFLOXACIN 500 MG/1
500 TABLET, FILM COATED ORAL 2 TIMES DAILY
Qty: 14 TABLET | Refills: 0 | Status: SHIPPED | OUTPATIENT
Start: 2019-04-16 | End: 2019-04-23

## 2019-04-16 RX ORDER — EPINEPHRINE 1 MG/ML
0.3 INJECTION, SOLUTION, CONCENTRATE INTRAVENOUS PRN
Status: CANCELLED | OUTPATIENT
Start: 2019-04-16

## 2019-04-16 RX ORDER — SODIUM CHLORIDE 0.9 % (FLUSH) 0.9 %
10 SYRINGE (ML) INJECTION PRN
Status: CANCELLED | OUTPATIENT
Start: 2019-04-16

## 2019-04-16 RX ORDER — 0.9 % SODIUM CHLORIDE 0.9 %
10 VIAL (ML) INJECTION ONCE
Status: CANCELLED | OUTPATIENT
Start: 2019-04-16

## 2019-04-16 RX ORDER — METHYLPREDNISOLONE SODIUM SUCCINATE 125 MG/2ML
125 INJECTION, POWDER, LYOPHILIZED, FOR SOLUTION INTRAMUSCULAR; INTRAVENOUS ONCE
Status: CANCELLED | OUTPATIENT
Start: 2019-04-16

## 2019-04-16 RX ORDER — SODIUM CHLORIDE 9 MG/ML
INJECTION, SOLUTION INTRAVENOUS CONTINUOUS
Status: CANCELLED | OUTPATIENT
Start: 2019-04-16

## 2019-04-16 RX ORDER — HEPARIN SODIUM (PORCINE) LOCK FLUSH IV SOLN 100 UNIT/ML 100 UNIT/ML
500 SOLUTION INTRAVENOUS PRN
Status: CANCELLED | OUTPATIENT
Start: 2019-04-16

## 2019-04-16 RX ORDER — SODIUM CHLORIDE 9 MG/ML
INJECTION, SOLUTION INTRAVENOUS ONCE
Status: COMPLETED | OUTPATIENT
Start: 2019-04-16 | End: 2019-04-16

## 2019-04-16 RX ORDER — DIPHENHYDRAMINE HYDROCHLORIDE 50 MG/ML
50 INJECTION INTRAMUSCULAR; INTRAVENOUS ONCE
Status: CANCELLED | OUTPATIENT
Start: 2019-04-16

## 2019-04-16 RX ORDER — SODIUM CHLORIDE 0.9 % (FLUSH) 0.9 %
5 SYRINGE (ML) INJECTION PRN
Status: CANCELLED | OUTPATIENT
Start: 2019-04-16

## 2019-04-16 RX ADMIN — SODIUM CHLORIDE 200 MG: 9 INJECTION, SOLUTION INTRAVENOUS at 15:05

## 2019-04-16 RX ADMIN — SODIUM CHLORIDE: 9 INJECTION, SOLUTION INTRAVENOUS at 15:04

## 2019-04-16 NOTE — PROGRESS NOTES
Patient tolerated [facility] treatment well.  Discharged stable with family

## 2019-04-16 NOTE — PROGRESS NOTES
to all pain pills except for the one he is on now---NORCO. \"       Current Outpatient Medications   Medication Sig Dispense Refill    finasteride (PROSCAR) 5 MG tablet TAKE 1 TABLET BY MOUTH ONE TIME A DAY  30 tablet 10    furosemide (LASIX) 20 MG tablet Take 2 tablets by mouth 2 times daily 60 tablet 3    metoprolol tartrate (LOPRESSOR) 50 MG tablet Take 1 tablet by mouth daily 60 tablet 3    magnesium hydroxide (MILK OF MAGNESIA) 400 MG/5ML suspension Take by mouth daily as needed for Constipation      POTASSIUM CHLORIDE PO Take 20 mEq by mouth daily      albuterol sulfate  (90 Base) MCG/ACT inhaler Inhale 2 puffs into the lungs 4 times daily as needed for Wheezing      mometasone-formoterol (DULERA) 200-5 MCG/ACT inhaler Inhale 2 puffs into the lungs every 12 hours      atorvastatin (LIPITOR) 40 MG tablet Take 40 mg by mouth nightly.  tamsulosin (FLOMAX) 0.4 MG capsule Take 0.8 mg by mouth nightly Takes 2 tablets to equal 0.8 mg      ipratropium-albuterol (DUONEB) 0.5-2.5 (3) MG/3ML SOLN nebulizer solution Inhale 3 mLs into the lungs 4 times daily 360 mL 0     No current facility-administered medications for this visit. REVIEW OF SYSTEM:   Constitutional: No fever or chills. No night sweats, no weight loss   Eyes: No eye discharge, double vision, or eye pain   HEENT: negative for sore mouth, sore throat, hoarseness and voice change   Respiratory: negative for cough , sputum, dyspnea, wheezing, hemoptysis, chest pain   Cardiovascular: negative for chest pain, dyspnea, palpitations, orthopnea, PND   Gastrointestinal: negative for nausea, vomiting, diarrhea, constipation, abdominal pain, Dysphagia, hematemesis and hematochezia   Genitourinary: negative for frequency, dysuria, nocturia, urinary incontinence, and hematuria   Integument: negative for rash, skin lesions, bruises.    Hematologic/Lymphatic: negative for easy bruising, bleeding, lymphadenopathy, petechiae and swelling/edema Endocrine: negative for heat or cold intolerance, tremor, weight changes, change in bowel habits and hair loss   Musculoskeletal: negative for myalgias, arthralgias, pain, joint swelling,and bone pain   Neurological: negative for headaches, dizziness, seizures, weakness, numbness   OBJECTIVE:         There were no vitals filed for this visit. PHYSICAL EXAM:   General appearance - well appearing, no in pain or distress   Mental status - alert and cooperative   Eyes - pupils equal and reactive, extraocular eye movements intact   Ears - bilateral TM's and external ear canals normal   Mouth - mucous membranes moist, pharynx normal without lesions   Neck - supple, no significant adenopathy   Lymphatics - no palpable lymphadenopathy, no hepatosplenomegaly   Chest - clear to auscultation, no wheezes, rales or rhonchi, symmetric air entry   Heart - normal rate, regular rhythm, normal S1, S2, no murmurs, rubs, clicks or gallops   Abdomen - soft, nontender, nondistended, no masses or organomegaly   Neurological - alert, oriented, normal speech, no focal findings or movement disorder noted   Musculoskeletal - no joint tenderness, deformity or swelling   Extremities - peripheral pulses normal, no pedal edema, no clubbing or cyanosis   Skin - normal coloration and turgor, no rashes, no suspicious skin lesions noted   LABORATORY DATA:     Lab Results   Component Value Date    WBC 7.7 04/16/2019    HGB 12.0 (L) 04/16/2019    HCT 36.7 (L) 04/16/2019    MCV 88.1 04/16/2019     04/16/2019    LYMPHOPCT 8 (L) 04/16/2019    RBC 4.17 (L) 04/16/2019    MCH 28.8 04/16/2019    MCHC 32.7 04/16/2019    RDW 17.4 (H) 04/16/2019    MONOPCT 19 (H) 04/16/2019    BASOPCT 1 04/16/2019    NEUTROABS 5.50 04/16/2019    LYMPHSABS 0.60 (L) 04/16/2019    MONOSABS 1.50 (H) 04/16/2019    EOSABS 0.00 04/16/2019    BASOSABS 0.00 04/16/2019       Chemistry        Component Value Date/Time     03/26/2019 1437    K 3.4 (L) 03/26/2019 1437    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 He is not a candidate for surgery as this is advanced disease and would need systemic treatment. I discussed the risk and benefits and side effects of immunotherapy. He was started on immunotherapy on 11/27/18. He tolerated immunotherapy will. I discussed the results of recent CT scan which showed significant progression of his disease with increase in size and number of lung liver and spleen metastasis and also increase in the size of the renal mass. Overall he has a very poor prognosis. He is not a candidate for chemotherapy with his poor performance status   2. I try pembrolizumab which has different mechanism of action. I asked him that we have very limited options and the chance of other immunotherapy working is very low. I also discussed option of comfort care/best supportive care. 3. I do not think is a candidate for cytoreductive nephrectomy   4. Left pleural effusion: Status post thoracentesis on 11/14 and on 12/21/18    5. Left shoulder pain: MRI of the shoulder showed rotator cuff tear but is not a candidate for surgery  6. SOB: getting better  7. Hematuria  8. Mild constipation  9. UTI: I will give her prescription for ciprofloxacin     PLAN:   Proceed with immunotherapy with pembrolizumab cycle #3 today  Plan to restage her after 4 cycles  Return to clinic with restaging scans prior to cycle #5        Ran Russ MD  Hematologist/Medical Oncologist    I spent more than 40 minutes examining, evaluating, reviewing data and counseling the patient. Greater than 50% of that time was spent face-to-face with the patient in counseling and coordinating her care.         This note is created with the assistance of a speech recognition program.  While intending to generate a document that actually reflects the content of the visit, the document can still have some errors including those of syntax and sound a like substitutions which may escape proof reading. It such instances, actual meaning can be extrapolated by contextual diversion.

## 2019-04-17 LAB
CULTURE: NORMAL
Lab: NORMAL
SPECIMEN DESCRIPTION: NORMAL

## 2019-04-25 ENCOUNTER — TELEPHONE (OUTPATIENT)
Dept: ONCOLOGY | Age: 79
End: 2019-04-25

## 2019-05-07 ENCOUNTER — HOSPITAL ENCOUNTER (OUTPATIENT)
Dept: INFUSION THERAPY | Age: 79
Discharge: HOME | End: 2019-05-07
Payer: MEDICARE

## 2019-05-07 VITALS
WEIGHT: 188.6 LBS | OXYGEN SATURATION: 96 % | BODY MASS INDEX: 24.21 KG/M2 | RESPIRATION RATE: 18 BRPM | DIASTOLIC BLOOD PRESSURE: 75 MMHG | HEART RATE: 71 BPM | SYSTOLIC BLOOD PRESSURE: 120 MMHG | TEMPERATURE: 97.4 F

## 2019-05-07 DIAGNOSIS — Z79.899 LONG TERM USE OF DRUG: ICD-10-CM

## 2019-05-07 DIAGNOSIS — C68.9 UROTHELIAL CANCER (HCC): Primary | ICD-10-CM

## 2019-05-07 DIAGNOSIS — C67.9 MALIGNANT NEOPLASM OF URINARY BLADDER, UNSPECIFIED SITE (HCC): ICD-10-CM

## 2019-05-07 LAB
ABSOLUTE EOS #: 0 K/UL (ref 0–0.4)
ABSOLUTE IMMATURE GRANULOCYTE: ABNORMAL K/UL (ref 0–0.3)
ABSOLUTE LYMPH #: 0.8 K/UL (ref 1–4.8)
ABSOLUTE MONO #: 1.5 K/UL (ref 0.1–1.2)
ALBUMIN SERPL-MCNC: 2.9 G/DL (ref 3.5–5.2)
ALBUMIN/GLOBULIN RATIO: 0.6 (ref 1–2.5)
ALP BLD-CCNC: 114 U/L (ref 40–129)
ALT SERPL-CCNC: 11 U/L (ref 5–41)
AMYLASE: 66 U/L (ref 28–100)
ANION GAP SERPL CALCULATED.3IONS-SCNC: 9 MMOL/L (ref 9–17)
AST SERPL-CCNC: 22 U/L
BASOPHILS # BLD: 1 % (ref 0–2)
BASOPHILS ABSOLUTE: 0 K/UL (ref 0–0.2)
BILIRUB SERPL-MCNC: 0.53 MG/DL (ref 0.3–1.2)
BUN BLDV-MCNC: 14 MG/DL (ref 8–23)
BUN/CREAT BLD: ABNORMAL (ref 9–20)
CALCIUM SERPL-MCNC: 9.1 MG/DL (ref 8.6–10.4)
CHLORIDE BLD-SCNC: 97 MMOL/L (ref 98–107)
CO2: 29 MMOL/L (ref 20–31)
CORTISOL COLLECTION INFO: NORMAL
CORTISOL: 11.9 UG/DL (ref 2.7–18.4)
CREAT SERPL-MCNC: 0.8 MG/DL (ref 0.7–1.2)
DIFFERENTIAL TYPE: ABNORMAL
EOSINOPHILS RELATIVE PERCENT: 1 % (ref 1–4)
GFR AFRICAN AMERICAN: >60 ML/MIN
GFR NON-AFRICAN AMERICAN: >60 ML/MIN
GFR SERPL CREATININE-BSD FRML MDRD: ABNORMAL ML/MIN/{1.73_M2}
GFR SERPL CREATININE-BSD FRML MDRD: ABNORMAL ML/MIN/{1.73_M2}
GLUCOSE BLD-MCNC: 112 MG/DL (ref 70–99)
HCT VFR BLD CALC: 35.2 % (ref 41–53)
HEMOGLOBIN: 11.7 G/DL (ref 13.5–17.5)
IMMATURE GRANULOCYTES: ABNORMAL %
LIPASE: 36 U/L (ref 13–60)
LYMPHOCYTES # BLD: 10 % (ref 24–44)
MCH RBC QN AUTO: 29.4 PG (ref 26–34)
MCHC RBC AUTO-ENTMCNC: 33.3 G/DL (ref 31–37)
MCV RBC AUTO: 88.3 FL (ref 80–100)
MONOCYTES # BLD: 18 % (ref 2–11)
NRBC AUTOMATED: ABNORMAL PER 100 WBC
PDW BLD-RTO: 17.4 % (ref 12.5–15.4)
PLATELET # BLD: 230 K/UL (ref 140–450)
PLATELET ESTIMATE: ABNORMAL
PMV BLD AUTO: 8.3 FL (ref 6–12)
POTASSIUM SERPL-SCNC: 4.3 MMOL/L (ref 3.7–5.3)
RBC # BLD: 3.99 M/UL (ref 4.5–5.9)
RBC # BLD: ABNORMAL 10*6/UL
SEG NEUTROPHILS: 70 % (ref 36–66)
SEGMENTED NEUTROPHILS ABSOLUTE COUNT: 5.9 K/UL (ref 1.8–7.7)
SODIUM BLD-SCNC: 135 MMOL/L (ref 135–144)
THYROXINE, FREE: 1.01 NG/DL (ref 0.93–1.7)
TOTAL PROTEIN: 7.7 G/DL (ref 6.4–8.3)
TSH SERPL DL<=0.05 MIU/L-ACNC: 1.12 MIU/L (ref 0.3–5)
WBC # BLD: 8.3 K/UL (ref 3.5–11)
WBC # BLD: ABNORMAL 10*3/UL

## 2019-05-07 PROCEDURE — 80053 COMPREHEN METABOLIC PANEL: CPT

## 2019-05-07 PROCEDURE — 82150 ASSAY OF AMYLASE: CPT

## 2019-05-07 PROCEDURE — 83690 ASSAY OF LIPASE: CPT

## 2019-05-07 PROCEDURE — 2580000003 HC RX 258: Performed by: INTERNAL MEDICINE

## 2019-05-07 PROCEDURE — 96360 HYDRATION IV INFUSION INIT: CPT

## 2019-05-07 PROCEDURE — 82533 TOTAL CORTISOL: CPT

## 2019-05-07 PROCEDURE — 96413 CHEMO IV INFUSION 1 HR: CPT

## 2019-05-07 PROCEDURE — 6360000002 HC RX W HCPCS: Performed by: INTERNAL MEDICINE

## 2019-05-07 PROCEDURE — 36415 COLL VENOUS BLD VENIPUNCTURE: CPT

## 2019-05-07 PROCEDURE — 96361 HYDRATE IV INFUSION ADD-ON: CPT

## 2019-05-07 PROCEDURE — 85025 COMPLETE CBC W/AUTO DIFF WBC: CPT

## 2019-05-07 PROCEDURE — 84443 ASSAY THYROID STIM HORMONE: CPT

## 2019-05-07 PROCEDURE — 84439 ASSAY OF FREE THYROXINE: CPT

## 2019-05-07 RX ORDER — METHYLPREDNISOLONE SODIUM SUCCINATE 125 MG/2ML
125 INJECTION, POWDER, LYOPHILIZED, FOR SOLUTION INTRAMUSCULAR; INTRAVENOUS ONCE
Status: CANCELLED | OUTPATIENT
Start: 2019-05-07

## 2019-05-07 RX ORDER — MEPERIDINE HYDROCHLORIDE 50 MG/ML
12.5 INJECTION INTRAMUSCULAR; INTRAVENOUS; SUBCUTANEOUS ONCE
Status: CANCELLED | OUTPATIENT
Start: 2019-05-07

## 2019-05-07 RX ORDER — SODIUM CHLORIDE 9 MG/ML
INJECTION, SOLUTION INTRAVENOUS CONTINUOUS
Status: CANCELLED | OUTPATIENT
Start: 2019-05-07

## 2019-05-07 RX ORDER — HEPARIN SODIUM (PORCINE) LOCK FLUSH IV SOLN 100 UNIT/ML 100 UNIT/ML
500 SOLUTION INTRAVENOUS PRN
Status: DISCONTINUED | OUTPATIENT
Start: 2019-05-07 | End: 2019-05-08 | Stop reason: HOSPADM

## 2019-05-07 RX ORDER — 0.9 % SODIUM CHLORIDE 0.9 %
1000 INTRAVENOUS SOLUTION INTRAVENOUS ONCE
Status: COMPLETED | OUTPATIENT
Start: 2019-05-07 | End: 2019-05-07

## 2019-05-07 RX ORDER — SODIUM CHLORIDE 9 MG/ML
INJECTION, SOLUTION INTRAVENOUS ONCE
Status: DISCONTINUED | OUTPATIENT
Start: 2019-05-07 | End: 2019-05-08 | Stop reason: HOSPADM

## 2019-05-07 RX ORDER — 0.9 % SODIUM CHLORIDE 0.9 %
10 VIAL (ML) INJECTION ONCE
Status: CANCELLED | OUTPATIENT
Start: 2019-05-07

## 2019-05-07 RX ORDER — DIPHENHYDRAMINE HYDROCHLORIDE 50 MG/ML
50 INJECTION INTRAMUSCULAR; INTRAVENOUS ONCE
Status: CANCELLED | OUTPATIENT
Start: 2019-05-07

## 2019-05-07 RX ORDER — EPINEPHRINE 1 MG/ML
0.3 INJECTION, SOLUTION, CONCENTRATE INTRAVENOUS PRN
Status: CANCELLED | OUTPATIENT
Start: 2019-05-07

## 2019-05-07 RX ORDER — SODIUM CHLORIDE 0.9 % (FLUSH) 0.9 %
10 SYRINGE (ML) INJECTION PRN
Status: DISCONTINUED | OUTPATIENT
Start: 2019-05-07 | End: 2019-05-08 | Stop reason: HOSPADM

## 2019-05-07 RX ORDER — SODIUM CHLORIDE 0.9 % (FLUSH) 0.9 %
5 SYRINGE (ML) INJECTION PRN
Status: CANCELLED | OUTPATIENT
Start: 2019-05-07

## 2019-05-07 RX ADMIN — SODIUM CHLORIDE 1000 ML: 9 INJECTION, SOLUTION INTRAVENOUS at 14:35

## 2019-05-07 RX ADMIN — SODIUM CHLORIDE 200 MG: 9 INJECTION, SOLUTION INTRAVENOUS at 15:23

## 2019-05-07 NOTE — PROGRESS NOTES
Pt here for [facility] ([place]). After standing to get patient weight as patient was sitting back in chair patients started to pass out. Patient family called the episode a seizure and states that he has been doing this often every time he stands up for any period of time. Once patient was sitting in chair vitals were taken. O2 sats were 73% oxygen applied at 2L. Dr. Rocky Sewell called and came to see patient. Patient was talking at that point. Alert and oriented but unable to recall event. Dr. Rocky Sewell ordered to give patient 1L fluids in addition to treatment. Pt was returned to room air and stable at 96%. Discharged in stable condition by wheelchair with family.
prior major surgery

## 2019-05-24 ENCOUNTER — HOSPITAL ENCOUNTER (OUTPATIENT)
Dept: CT IMAGING | Age: 79
Discharge: HOME | End: 2019-05-26
Payer: MEDICARE

## 2019-05-24 DIAGNOSIS — C67.9 MALIGNANT NEOPLASM OF URINARY BLADDER, UNSPECIFIED SITE (HCC): ICD-10-CM

## 2019-05-24 PROCEDURE — 2580000003 HC RX 258: Performed by: INTERNAL MEDICINE

## 2019-05-24 PROCEDURE — 6360000004 HC RX CONTRAST MEDICATION: Performed by: INTERNAL MEDICINE

## 2019-05-24 PROCEDURE — 74177 CT ABD & PELVIS W/CONTRAST: CPT

## 2019-05-24 RX ORDER — SODIUM CHLORIDE 0.9 % (FLUSH) 0.9 %
10 SYRINGE (ML) INJECTION PRN
Status: DISCONTINUED | OUTPATIENT
Start: 2019-05-24 | End: 2019-05-27 | Stop reason: HOSPADM

## 2019-05-24 RX ORDER — 0.9 % SODIUM CHLORIDE 0.9 %
80 INTRAVENOUS SOLUTION INTRAVENOUS ONCE
Status: COMPLETED | OUTPATIENT
Start: 2019-05-24 | End: 2019-05-24

## 2019-05-24 RX ADMIN — IOVERSOL 100 ML: 741 INJECTION INTRA-ARTERIAL; INTRAVENOUS at 11:44

## 2019-05-24 RX ADMIN — SODIUM CHLORIDE 80 ML: 9 INJECTION, SOLUTION INTRAVENOUS at 11:43

## 2019-05-24 RX ADMIN — IOHEXOL 50 ML: 240 INJECTION, SOLUTION INTRATHECAL; INTRAVASCULAR; INTRAVENOUS; ORAL at 11:43

## 2019-05-24 RX ADMIN — Medication 10 ML: at 11:43

## 2019-05-28 ENCOUNTER — TELEPHONE (OUTPATIENT)
Dept: ONCOLOGY | Age: 79
End: 2019-05-28

## 2019-05-28 ENCOUNTER — HOSPITAL ENCOUNTER (OUTPATIENT)
Dept: INFUSION THERAPY | Age: 79
Discharge: HOME | End: 2019-05-28
Payer: MEDICARE

## 2019-05-28 ENCOUNTER — OFFICE VISIT (OUTPATIENT)
Dept: ONCOLOGY | Age: 79
End: 2019-05-28
Payer: MEDICARE

## 2019-05-28 VITALS
SYSTOLIC BLOOD PRESSURE: 97 MMHG | DIASTOLIC BLOOD PRESSURE: 60 MMHG | TEMPERATURE: 97.7 F | BODY MASS INDEX: 22.98 KG/M2 | HEART RATE: 96 BPM | WEIGHT: 179 LBS

## 2019-05-28 DIAGNOSIS — C67.9 MALIGNANT NEOPLASM OF URINARY BLADDER, UNSPECIFIED SITE (HCC): Primary | ICD-10-CM

## 2019-05-28 DIAGNOSIS — C68.9 UROTHELIAL CANCER (HCC): ICD-10-CM

## 2019-05-28 LAB
ABSOLUTE EOS #: 0.1 K/UL (ref 0–0.4)
ABSOLUTE IMMATURE GRANULOCYTE: ABNORMAL K/UL (ref 0–0.3)
ABSOLUTE LYMPH #: 0.76 K/UL (ref 1–4.8)
ABSOLUTE MONO #: 1.81 K/UL (ref 0.1–0.8)
ALBUMIN SERPL-MCNC: 2.4 G/DL (ref 3.5–5.2)
ALBUMIN/GLOBULIN RATIO: 0.4 (ref 1–2.5)
ALP BLD-CCNC: 97 U/L (ref 40–129)
ALT SERPL-CCNC: 10 U/L (ref 5–41)
ANION GAP SERPL CALCULATED.3IONS-SCNC: 11 MMOL/L (ref 9–17)
AST SERPL-CCNC: 18 U/L
BASOPHILS # BLD: 0 % (ref 0–2)
BASOPHILS ABSOLUTE: 0 K/UL (ref 0–0.2)
BILIRUB SERPL-MCNC: 0.72 MG/DL (ref 0.3–1.2)
BUN BLDV-MCNC: 11 MG/DL (ref 8–23)
BUN/CREAT BLD: ABNORMAL (ref 9–20)
CALCIUM SERPL-MCNC: 9.9 MG/DL (ref 8.6–10.4)
CHLORIDE BLD-SCNC: 96 MMOL/L (ref 98–107)
CO2: 28 MMOL/L (ref 20–31)
CREAT SERPL-MCNC: 0.91 MG/DL (ref 0.7–1.2)
DIFFERENTIAL TYPE: ABNORMAL
EOSINOPHILS RELATIVE PERCENT: 1 % (ref 1–4)
GFR AFRICAN AMERICAN: >60 ML/MIN
GFR NON-AFRICAN AMERICAN: >60 ML/MIN
GFR SERPL CREATININE-BSD FRML MDRD: ABNORMAL ML/MIN/{1.73_M2}
GFR SERPL CREATININE-BSD FRML MDRD: ABNORMAL ML/MIN/{1.73_M2}
GLUCOSE BLD-MCNC: 97 MG/DL (ref 70–99)
HCT VFR BLD CALC: 35.5 % (ref 41–53)
HEMOGLOBIN: 12.1 G/DL (ref 13.5–17.5)
IMMATURE GRANULOCYTES: ABNORMAL %
LYMPHOCYTES # BLD: 8 % (ref 24–44)
MCH RBC QN AUTO: 30.2 PG (ref 26–34)
MCHC RBC AUTO-ENTMCNC: 34 G/DL (ref 31–37)
MCV RBC AUTO: 89 FL (ref 80–100)
MONOCYTES # BLD: 19 % (ref 1–7)
MORPHOLOGY: NORMAL
NRBC AUTOMATED: ABNORMAL PER 100 WBC
PDW BLD-RTO: 16.1 % (ref 12.5–15.4)
PLATELET # BLD: 258 K/UL (ref 140–450)
PLATELET ESTIMATE: ABNORMAL
PMV BLD AUTO: 8.3 FL (ref 6–12)
POTASSIUM SERPL-SCNC: 3.8 MMOL/L (ref 3.7–5.3)
RBC # BLD: 3.98 M/UL (ref 4.5–5.9)
RBC # BLD: ABNORMAL 10*6/UL
SEG NEUTROPHILS: 72 % (ref 36–66)
SEGMENTED NEUTROPHILS ABSOLUTE COUNT: 6.83 K/UL (ref 1.8–7.7)
SODIUM BLD-SCNC: 135 MMOL/L (ref 135–144)
TOTAL PROTEIN: 7.8 G/DL (ref 6.4–8.3)
WBC # BLD: 9.5 K/UL (ref 3.5–11)
WBC # BLD: ABNORMAL 10*3/UL

## 2019-05-28 PROCEDURE — 85025 COMPLETE CBC W/AUTO DIFF WBC: CPT

## 2019-05-28 PROCEDURE — 80053 COMPREHEN METABOLIC PANEL: CPT

## 2019-05-28 PROCEDURE — 99215 OFFICE O/P EST HI 40 MIN: CPT | Performed by: INTERNAL MEDICINE

## 2019-05-28 PROCEDURE — 36415 COLL VENOUS BLD VENIPUNCTURE: CPT

## 2019-05-28 RX ORDER — DIPHENHYDRAMINE HYDROCHLORIDE 50 MG/ML
50 INJECTION INTRAMUSCULAR; INTRAVENOUS ONCE
Status: CANCELLED | OUTPATIENT
Start: 2019-05-28

## 2019-05-28 RX ORDER — METHYLPREDNISOLONE SODIUM SUCCINATE 125 MG/2ML
125 INJECTION, POWDER, LYOPHILIZED, FOR SOLUTION INTRAMUSCULAR; INTRAVENOUS ONCE
Status: CANCELLED | OUTPATIENT
Start: 2019-05-28

## 2019-05-28 RX ORDER — SODIUM CHLORIDE 9 MG/ML
INJECTION, SOLUTION INTRAVENOUS ONCE
Status: CANCELLED | OUTPATIENT
Start: 2019-05-28

## 2019-05-28 RX ORDER — SODIUM CHLORIDE 9 MG/ML
INJECTION, SOLUTION INTRAVENOUS CONTINUOUS
Status: CANCELLED | OUTPATIENT
Start: 2019-05-28

## 2019-05-28 RX ORDER — MEPERIDINE HYDROCHLORIDE 50 MG/ML
12.5 INJECTION INTRAMUSCULAR; INTRAVENOUS; SUBCUTANEOUS ONCE
Status: CANCELLED | OUTPATIENT
Start: 2019-05-28

## 2019-05-28 RX ORDER — SODIUM CHLORIDE 0.9 % (FLUSH) 0.9 %
10 SYRINGE (ML) INJECTION PRN
Status: CANCELLED | OUTPATIENT
Start: 2019-05-28

## 2019-05-28 RX ORDER — HEPARIN SODIUM (PORCINE) LOCK FLUSH IV SOLN 100 UNIT/ML 100 UNIT/ML
500 SOLUTION INTRAVENOUS PRN
Status: CANCELLED | OUTPATIENT
Start: 2019-05-28

## 2019-05-28 RX ORDER — SODIUM CHLORIDE 0.9 % (FLUSH) 0.9 %
5 SYRINGE (ML) INJECTION PRN
Status: CANCELLED | OUTPATIENT
Start: 2019-05-28

## 2019-05-28 RX ORDER — 0.9 % SODIUM CHLORIDE 0.9 %
10 VIAL (ML) INJECTION ONCE
Status: CANCELLED | OUTPATIENT
Start: 2019-05-28

## 2019-05-28 NOTE — TELEPHONE ENCOUNTER
Per Dr India Ramos AVS-no chemo, return as needed-pt to go w/hospice care-pt already established-wife informed to contact [facility] if she needs anything or has questions, understood

## 2019-05-28 NOTE — PROGRESS NOTES
 Oxycodone Hcl      confusion    Other      HALLUCINATES ,      Relates is \"allergic to all pain pills except for the one he is on now---NORCO. \"       Current Outpatient Medications   Medication Sig Dispense Refill    finasteride (PROSCAR) 5 MG tablet TAKE 1 TABLET BY MOUTH ONE TIME A DAY  30 tablet 10    furosemide (LASIX) 20 MG tablet Take 2 tablets by mouth 2 times daily (Patient taking differently: Take 40 mg by mouth daily ) 60 tablet 3    ipratropium-albuterol (DUONEB) 0.5-2.5 (3) MG/3ML SOLN nebulizer solution Inhale 3 mLs into the lungs 4 times daily 360 mL 0    metoprolol tartrate (LOPRESSOR) 50 MG tablet Take 1 tablet by mouth daily 60 tablet 3    magnesium hydroxide (MILK OF MAGNESIA) 400 MG/5ML suspension Take by mouth daily as needed for Constipation      POTASSIUM CHLORIDE PO Take 20 mEq by mouth daily      albuterol sulfate  (90 Base) MCG/ACT inhaler Inhale 2 puffs into the lungs 4 times daily as needed for Wheezing      mometasone-formoterol (DULERA) 200-5 MCG/ACT inhaler Inhale 2 puffs into the lungs every 12 hours      atorvastatin (LIPITOR) 40 MG tablet Take 40 mg by mouth nightly.  tamsulosin (FLOMAX) 0.4 MG capsule Take 0.8 mg by mouth nightly Takes 2 tablets to equal 0.8 mg       No current facility-administered medications for this visit. REVIEW OF SYSTEM:   Constitutional: No fever or chills.  No night sweats, no weight loss   Eyes: No eye discharge, double vision, or eye pain   HEENT: negative for sore mouth, sore throat, hoarseness and voice change   Respiratory: negative for cough , sputum, dyspnea, wheezing, hemoptysis, chest pain   Cardiovascular: negative for chest pain, dyspnea, palpitations, orthopnea, PND   Gastrointestinal: negative for nausea, vomiting, diarrhea, constipation, abdominal pain, Dysphagia, hematemesis and hematochezia   Genitourinary: negative for frequency, dysuria, nocturia, urinary incontinence, and hematuria   Integument: negative for rash, skin lesions, bruises.    Hematologic/Lymphatic: negative for easy bruising, bleeding, lymphadenopathy, petechiae and swelling/edema   Endocrine: negative for heat or cold intolerance, tremor, weight changes, change in bowel habits and hair loss   Musculoskeletal: negative for myalgias, arthralgias, pain, joint swelling,and bone pain   Neurological: negative for headaches, dizziness, seizures, weakness, numbness   OBJECTIVE:         Vitals:    05/28/19 1002   BP: 97/60   Pulse: 96   Temp: 97.7 °F (36.5 °C)   PHYSICAL EXAM:   General appearance - well appearing, no in pain or distress   Mental status - alert and cooperative   Eyes - pupils equal and reactive, extraocular eye movements intact   Ears - bilateral TM's and external ear canals normal   Mouth - mucous membranes moist, pharynx normal without lesions   Neck - supple, no significant adenopathy   Lymphatics - no palpable lymphadenopathy, no hepatosplenomegaly   Chest - clear to auscultation, no wheezes, rales or rhonchi, symmetric air entry   Heart - normal rate, regular rhythm, normal S1, S2, no murmurs, rubs, clicks or gallops   Abdomen - soft, nontender, nondistended, no masses or organomegaly   Neurological - alert, oriented, normal speech, no focal findings or movement disorder noted   Musculoskeletal - no joint tenderness, deformity or swelling   Extremities - peripheral pulses normal, no pedal edema, no clubbing or cyanosis   Skin - normal coloration and turgor, no rashes, no suspicious skin lesions noted   LABORATORY DATA:     Lab Results   Component Value Date    WBC 9.5 05/28/2019    HGB 12.1 (L) 05/28/2019    HCT 35.5 (L) 05/28/2019    MCV 89.0 05/28/2019     05/28/2019    LYMPHOPCT PENDING 05/28/2019    RBC 3.98 (L) 05/28/2019    MCH 30.2 05/28/2019    MCHC 34.0 05/28/2019    RDW 16.1 (H) 05/28/2019    MONOPCT PENDING 05/28/2019    BASOPCT PENDING 05/28/2019    NEUTROABS PENDING 05/28/2019    LYMPHSABS PENDING 05/28/2019    MONOSABS PENDING 05/28/2019    EOSABS PENDING 05/28/2019    BASOSABS PENDING 05/28/2019       Chemistry        Component Value Date/Time     05/07/2019 1343    K 4.3 05/07/2019 1343    CL 97 (L) 05/07/2019 1343    CO2 29 05/07/2019 1343    BUN 14 05/07/2019 1343    CREATININE 0.80 05/07/2019 1343        Component Value Date/Time    CALCIUM 9.1 05/07/2019 1343    ALKPHOS 114 05/07/2019 1343    AST 22 05/07/2019 1343    ALT 11 05/07/2019 1343    BILITOT 0.53 05/07/2019 1343        PATHOLOGY DATA:   RIGHT RENAL PELVIS, BIOPSY:       HIGH GRADE UROTHELIAL CARCINOMA (SEE COMMENT)   Diagnosis Comment   Within the observed sections, there is no muscularis propria   identified.  There are no definitive features of invasion identified   within this limited biopsy.  Please correlate with appropriate   clinical findings.  Findings discussed with Dr. Leela Sales on September 19, 2018. IMAGING DATA:    CT scan chest abdomen pelvis 12/7/18  Impression   CT evidence of progression of disease within the chest and abdomen.       *Interval increase in size in number of the patient's bilateral pulmonary   metastases. *Interval worsening of the patient's large left-sided pleural effusion since   the prior study. *Interval development of liver and splenic metastases compared to the prior   urogram study. *Interval increase in size of the patient's primary lesion involving the   right kidney with extension into the right renal pelvis since the prior   study.  No obstruction is noted.    CT chest abd pelvis  2/12/19  Impression   Interval disease progression manifested by increased size and number of   pulmonary, hepatic and splenic metastases as well as size of primary right   renal mass.  Right renal vein and IVC are patent and without evidence of   invasion.       New moderate volume right pleural effusion with increased size of loculated   left pleural effusion, presumably malignant.       Nondisplaced subacute/healing left 4th-6th some errors including those of syntax and sound a like substitutions which may escape proof reading. It such instances, actual meaning can be extrapolated by contextual diversion.

## (undated) DEVICE — RENTAL LASER HOLMIUM LOW WATTAGE CASE

## (undated) DEVICE — GLOVE SURG SZ 65 STD WHT LTX SYN POLYMER BEAD REINF ANTI RL

## (undated) DEVICE — GLOVE SURG SZ 75 STD WHT LTX SYN POLYMER BEAD REINF ANTI RL

## (undated) DEVICE — GUIDEWIRE URO L150CM DIA0.035IN STIFF NIT HYDRPHLC STR TIP

## (undated) DEVICE — STRIP,CLOSURE,WOUND,MEDI-STRIP,1/2X4: Brand: MEDLINE

## (undated) DEVICE — PAD,NON-ADHERENT,3X8,STERILE,LF,1/PK: Brand: MEDLINE

## (undated) DEVICE — DUAL LUMEN URETERAL CATHETER

## (undated) DEVICE — SINGLE ACTION PUMPING SYSTEM

## (undated) DEVICE — ADAPTER URO SCP UROLOK LL

## (undated) DEVICE — ST CHARLES CYSTO PACK: Brand: MEDLINE INDUSTRIES, INC.

## (undated) DEVICE — GOWN,AURORA,NONREINFORCED,LARGE: Brand: MEDLINE

## (undated) DEVICE — Z INACTIVE USE 2660664 SOLUTION IRRIG 3000ML 0.9% SOD CHL USP UROMATIC PLAS CONT

## (undated) DEVICE — SOLUTION IV IRRIG WATER 1000ML POUR BRL 2F7114

## (undated) DEVICE — 3M™ STERI-STRIP™ COMPOUND BENZOIN TINCTURE 40 BAGS/CARTON 4 CARTONS/CASE C1544: Brand: 3M™ STERI-STRIP™

## (undated) DEVICE — URETEROSCOPIC BIOPSY FORCEPS: Brand: PIRANHA